# Patient Record
Sex: MALE | NOT HISPANIC OR LATINO
[De-identification: names, ages, dates, MRNs, and addresses within clinical notes are randomized per-mention and may not be internally consistent; named-entity substitution may affect disease eponyms.]

---

## 2017-02-08 ENCOUNTER — CHARTING TRANS (OUTPATIENT)
Dept: OTHER | Age: 67
End: 2017-02-08

## 2017-03-02 ENCOUNTER — TELEPHONE (OUTPATIENT)
Dept: FAMILY MEDICINE CLINIC | Facility: CLINIC | Age: 67
End: 2017-03-02

## 2017-03-02 RX ORDER — RANITIDINE 150 MG/1
150 TABLET ORAL NIGHTLY
Qty: 90 TABLET | Refills: 3 | Status: SHIPPED | OUTPATIENT
Start: 2017-03-02 | End: 2018-01-16

## 2017-03-02 RX ORDER — TAMSULOSIN HYDROCHLORIDE 0.4 MG/1
CAPSULE ORAL DAILY
Qty: 180 CAPSULE | Refills: 3 | Status: SHIPPED | OUTPATIENT
Start: 2017-03-02 | End: 2018-03-21

## 2017-03-02 RX ORDER — LISINOPRIL 5 MG/1
5 TABLET ORAL DAILY
Qty: 90 TABLET | Refills: 3 | Status: SHIPPED | OUTPATIENT
Start: 2017-03-02 | End: 2018-03-21

## 2017-03-02 NOTE — TELEPHONE ENCOUNTER
Left message on voicemail/answering machine for patient to call office  Please let him know scripts have been sent

## 2017-03-02 NOTE — TELEPHONE ENCOUNTER
Lisinopril, Tamsulosin and Ranitidine all in Epic as Historical.  Last labs in [address] since 2014. Last seen on 12/6/16. /80. Please advise.   Future Appointments  Date Time Provider Irene Naima   3/13/2017 9:15 AM Haley Ennis MD [facility]

## 2017-03-15 ENCOUNTER — TELEPHONE (OUTPATIENT)
Dept: FAMILY MEDICINE CLINIC | Facility: CLINIC | Age: 67
End: 2017-03-15

## 2017-03-16 PROBLEM — K21.00 GASTROESOPHAGEAL REFLUX DISEASE WITH ESOPHAGITIS: Status: ACTIVE | Noted: 2017-03-16

## 2017-03-16 PROBLEM — Z86.0100 PERSONAL HISTORY OF COLONIC POLYPS: Status: ACTIVE | Noted: 2017-03-16

## 2017-03-16 PROBLEM — K22.70 BARRETT'S ESOPHAGUS WITHOUT DYSPLASIA: Status: ACTIVE | Noted: 2017-03-16

## 2017-03-16 PROBLEM — Z86.010 PERSONAL HISTORY OF COLONIC POLYPS: Status: ACTIVE | Noted: 2017-03-16

## 2017-04-11 ENCOUNTER — HOSPITAL ENCOUNTER (OUTPATIENT)
Age: 67
Discharge: HOME | End: 2017-04-11
Attending: EMERGENCY MEDICINE
Payer: COMMERCIAL

## 2017-04-11 VITALS
OXYGEN SATURATION: 97 % | DIASTOLIC BLOOD PRESSURE: 81 MMHG | HEIGHT: 72 IN | TEMPERATURE: 99 F | SYSTOLIC BLOOD PRESSURE: 160 MMHG | RESPIRATION RATE: 16 BRPM | HEART RATE: 78 BPM | BODY MASS INDEX: 24.38 KG/M2 | WEIGHT: 180 LBS

## 2017-04-11 DIAGNOSIS — H66.001 ACUTE SUPPURATIVE OTITIS MEDIA OF RIGHT EAR WITHOUT SPONTANEOUS RUPTURE OF TYMPANIC MEMBRANE, RECURRENCE NOT SPECIFIED: Primary | ICD-10-CM

## 2017-04-11 DIAGNOSIS — R09.81 NASAL SINUS CONGESTION: ICD-10-CM

## 2017-04-11 PROCEDURE — 99214 OFFICE O/P EST MOD 30 MIN: CPT

## 2017-04-11 PROCEDURE — 99213 OFFICE O/P EST LOW 20 MIN: CPT

## 2017-04-11 RX ORDER — AZITHROMYCIN 250 MG/1
TABLET, FILM COATED ORAL
Qty: 1 PACKAGE | Refills: 0 | Status: SHIPPED | OUTPATIENT
Start: 2017-04-11 | End: 2017-04-16

## 2017-04-11 NOTE — ED PROVIDER NOTES
Patient presents with:  Sinusitis  Nasal Congestion    HPI:     Mykel Polanco is a 74year old male who presents with chief complaint of nasal congestion and ear pain. Started with nasal congestion very minimally about 1.5 weeks ago.   Over the last 2-3 media  Nasal sinus congestion    Plan:  1. Zpack Rx  2. Supportive care - APAP, antihistamine  3. F/u with PCP in 3-4 days as needed for re-evaluation, sooner if symptoms worsen      All results reviewed and discussed with patient.   See AVS for detailed

## 2017-05-04 ENCOUNTER — HOSPITAL ENCOUNTER (OUTPATIENT)
Age: 67
Discharge: HOME | End: 2017-05-04
Payer: COMMERCIAL

## 2017-05-04 VITALS
OXYGEN SATURATION: 96 % | RESPIRATION RATE: 18 BRPM | TEMPERATURE: 98 F | DIASTOLIC BLOOD PRESSURE: 80 MMHG | HEART RATE: 78 BPM | SYSTOLIC BLOOD PRESSURE: 123 MMHG

## 2017-05-04 DIAGNOSIS — B97.89 VIRAL SINUSITIS: Primary | ICD-10-CM

## 2017-05-04 DIAGNOSIS — J32.9 VIRAL SINUSITIS: Primary | ICD-10-CM

## 2017-05-04 PROCEDURE — 99213 OFFICE O/P EST LOW 20 MIN: CPT

## 2017-05-04 PROCEDURE — 99214 OFFICE O/P EST MOD 30 MIN: CPT

## 2017-05-04 RX ORDER — AZITHROMYCIN 250 MG/1
TABLET, FILM COATED ORAL
Qty: 1 PACKAGE | Refills: 0 | Status: SHIPPED | OUTPATIENT
Start: 2017-05-04 | End: 2017-05-09

## 2017-05-04 RX ORDER — LORATADINE 10 MG/1
10 TABLET ORAL DAILY
Qty: 30 TABLET | Refills: 0 | Status: SHIPPED | OUTPATIENT
Start: 2017-05-04 | End: 2017-06-03

## 2017-05-04 NOTE — ED PROVIDER NOTES
Patient Seen in: [address] [facility]    History   Patient presents with:  Ear Pain    Stated Complaint: LEFT EAR PAIN    HPI    Patient is a very pleasant 53-year-old male.   Patient arrives for evaluation of nasal congestion and left ear pain systems reviewed and negative except as noted above. PSFH elements reviewed from today and agreed except as otherwise stated in HPI.     Physical Exam       ED Triage Vitals   BP 05/04/17 1657 123/80 mmHg   Pulse 05/04/17 1657 78   Resp 05/04/17 1657 18 improving.     Disposition and Plan     Clinical Impression:  Viral sinusitis  (primary encounter diagnosis)    Disposition:  Discharge    Follow-up:  Cristal Thomas MD  [address] [place] [address],[address]  [facility] [phone]            Medications Prescrib

## 2017-07-20 PROCEDURE — 88305 TISSUE EXAM BY PATHOLOGIST: CPT | Performed by: INTERNAL MEDICINE

## 2017-10-10 ENCOUNTER — IMMUNIZATION (OUTPATIENT)
Dept: FAMILY MEDICINE CLINIC | Facility: CLINIC | Age: 67
End: 2017-10-10

## 2017-10-10 ENCOUNTER — MED REC SCAN ONLY (OUTPATIENT)
Dept: FAMILY MEDICINE CLINIC | Facility: CLINIC | Age: 67
End: 2017-10-10

## 2017-10-10 PROCEDURE — 90653 IIV ADJUVANT VACCINE IM: CPT | Performed by: FAMILY MEDICINE

## 2017-10-10 PROCEDURE — 90471 IMMUNIZATION ADMIN: CPT | Performed by: FAMILY MEDICINE

## 2017-11-27 ENCOUNTER — HOSPITAL ENCOUNTER (OUTPATIENT)
Age: 67
Discharge: HOME | End: 2017-11-27
Payer: COMMERCIAL

## 2017-11-27 ENCOUNTER — APPOINTMENT (OUTPATIENT)
Dept: GENERAL RADIOLOGY | Age: 67
End: 2017-11-27
Attending: NURSE PRACTITIONER
Payer: COMMERCIAL

## 2017-11-27 VITALS
HEIGHT: 71 IN | HEART RATE: 78 BPM | WEIGHT: 171 LBS | SYSTOLIC BLOOD PRESSURE: 114 MMHG | OXYGEN SATURATION: 96 % | BODY MASS INDEX: 23.94 KG/M2 | DIASTOLIC BLOOD PRESSURE: 71 MMHG | TEMPERATURE: 98 F | RESPIRATION RATE: 18 BRPM

## 2017-11-27 DIAGNOSIS — J01.00 ACUTE MAXILLARY SINUSITIS, RECURRENCE NOT SPECIFIED: ICD-10-CM

## 2017-11-27 DIAGNOSIS — J40 BRONCHITIS: Primary | ICD-10-CM

## 2017-11-27 PROCEDURE — 71020 XR CHEST PA + LAT CHEST (CPT=71020): CPT | Performed by: NURSE PRACTITIONER

## 2017-11-27 PROCEDURE — 99214 OFFICE O/P EST MOD 30 MIN: CPT

## 2017-11-27 PROCEDURE — 99213 OFFICE O/P EST LOW 20 MIN: CPT

## 2017-11-27 RX ORDER — DOXYCYCLINE HYCLATE 100 MG/1
100 CAPSULE ORAL 2 TIMES DAILY
Qty: 14 CAPSULE | Refills: 0 | Status: SHIPPED | OUTPATIENT
Start: 2017-11-27 | End: 2017-12-04

## 2017-11-27 RX ORDER — PREDNISONE 20 MG/1
20 TABLET ORAL 2 TIMES DAILY
Qty: 10 TABLET | Refills: 0 | Status: SHIPPED | OUTPATIENT
Start: 2017-11-27 | End: 2017-12-02

## 2017-11-27 NOTE — ED INITIAL ASSESSMENT (HPI)
Pt with c/o cough and congestion. Pt c/o productive green sputum. Pt with head pressure. Denies fevers. Taking robitussin and advil cold medicine. Pt c/o discomfort from coughing.

## 2017-11-27 NOTE — ED PROVIDER NOTES
Patient Seen in: [address] [facility]    History   Patient presents with:  Cough/URI    Stated Complaint: coughing and sinus infection    26-year-old male who presents to the immediate care with complaints of cough and congestion with a produc 0.0 oz/week     Comment: 1 or 2 per week       Review of Systems   Constitutional: Positive for chills. Negative for fever. HENT: Positive for congestion, postnasal drip, sinus pain and sinus pressure. Respiratory: Positive for cough.  Negative no decreased breath sounds. He has no wheezes. He has no rales. He exhibits no tenderness. Neurological: He is alert and oriented to person, place, and time. Skin: Skin is warm and dry. He is not diaphoretic.    Psychiatric: He has a normal mood and aff Prescribed:  Discharge Medication List as of 11/27/2017 11:08 AM    START taking these medications    Doxycycline Hyclate 100 MG Oral Cap  Take 1 capsule (100 mg total) by mouth 2 (two) times daily. , Normal, Disp-14 capsule, R-0    predniSONE 20 MG Oral Ta

## 2017-11-30 ENCOUNTER — TELEPHONE (OUTPATIENT)
Dept: FAMILY MEDICINE CLINIC | Facility: CLINIC | Age: 67
End: 2017-11-30

## 2017-11-30 RX ORDER — BENZONATATE 100 MG/1
100 CAPSULE ORAL 2 TIMES DAILY PRN
Qty: 14 CAPSULE | Refills: 0 | Status: SHIPPED | OUTPATIENT
Start: 2017-11-30 | End: 2017-12-07

## 2017-11-30 NOTE — TELEPHONE ENCOUNTER
Pt called, went to [facility] this past Monday, dx with Bronchitis. Pt told if he was not feeling any better in a couple of days to contact his PCP-Dr. Duran Young. Pt states he was given something and it is not working at all and still feels terrible.

## 2017-11-30 NOTE — TELEPHONE ENCOUNTER
Called Patient and discussed about his symptoms. Patient states he was seen at [facility] on 11/27/17. He was diagnosed with Bronchitis and sinusitis. CXR was normal.  Denies fever and SOB. States he started taking Doxycycline on 11/27/17.    Explained to patient t

## 2017-11-30 NOTE — TELEPHONE ENCOUNTER
Spoke with pt he was prescribed the following:    Doxycycline/hyclate 1000mg BID  20 mg Prednisone BID  Claritin daily  Flonase BID    Pt states that nothing is helping and he is coughing non-stop.   Unable to lay down to sleep, has to sleep sitting up in c

## 2017-12-04 ENCOUNTER — TELEPHONE (OUTPATIENT)
Dept: FAMILY MEDICINE CLINIC | Facility: CLINIC | Age: 67
End: 2017-12-04

## 2017-12-04 ENCOUNTER — OFFICE VISIT (OUTPATIENT)
Dept: FAMILY MEDICINE CLINIC | Facility: CLINIC | Age: 67
End: 2017-12-04

## 2017-12-04 VITALS
TEMPERATURE: 99 F | WEIGHT: 177.19 LBS | HEART RATE: 96 BPM | SYSTOLIC BLOOD PRESSURE: 120 MMHG | DIASTOLIC BLOOD PRESSURE: 70 MMHG | BODY MASS INDEX: 25 KG/M2 | RESPIRATION RATE: 18 BRPM

## 2017-12-04 DIAGNOSIS — J01.40 ACUTE NON-RECURRENT PANSINUSITIS: Primary | ICD-10-CM

## 2017-12-04 DIAGNOSIS — R05.9 COUGH: ICD-10-CM

## 2017-12-04 PROCEDURE — 99214 OFFICE O/P EST MOD 30 MIN: CPT | Performed by: FAMILY MEDICINE

## 2017-12-04 RX ORDER — CHLORAL HYDRATE 500 MG
1000 CAPSULE ORAL DAILY
COMMUNITY

## 2017-12-04 RX ORDER — PROMETHAZINE HYDROCHLORIDE AND CODEINE PHOSPHATE 6.25; 1 MG/5ML; MG/5ML
SYRUP ORAL NIGHTLY PRN
Qty: 120 ML | Refills: 0 | Status: SHIPPED
Start: 2017-12-04 | End: 2018-01-16 | Stop reason: ALTCHOICE

## 2017-12-04 RX ORDER — CLARITHROMYCIN 500 MG/1
500 TABLET, COATED ORAL 2 TIMES DAILY
Qty: 20 TABLET | Refills: 0 | Status: SHIPPED | OUTPATIENT
Start: 2017-12-04 | End: 2017-12-14

## 2017-12-04 NOTE — TELEPHONE ENCOUNTER
Called the pharmacy to find out which drug this interacts with - he states the tamulosin-       The biaxin slows down the breakdown of the tamulosin and can increase the med-    Spoke with Dr. Emerson Murillo and she wants the pt to hold the flomax while on the biaxi

## 2017-12-04 NOTE — TELEPHONE ENCOUNTER
Pt said he was told to called the office to talk with the nurse about how is suppose to take him medicine.  Please call back

## 2017-12-04 NOTE — TELEPHONE ENCOUNTER
Hellen Fernandez from pharmacy called. There is a drug interaction between the clarithromycin (BIAXIN) 500 MG Oral Tab and another medication pt currently takes.   Please call pharmacy at [phone]

## 2017-12-04 NOTE — TELEPHONE ENCOUNTER
Pt called back- then I called him back to let him know to hold the tamsulosin while on the biaxin- he v/u

## 2017-12-04 NOTE — PROGRESS NOTES
Sydnee Demarco is a 69year old male. HPI:   Pt is here for ER f/u.     ER: Richar Scanlon  Date: 11/27/17  Reason for ER visit: cough, congestion  Records received and reviewed: yes, diagnosed with bronchitis and sinusitis  Pertinent results/diagnoses from ER nightly. Disp: 90 tablet Rfl: 3   tamsulosin HCl (FLOMAX) 0.4 MG Oral Cap Take 1-2 capsules (0.4-0.8 mg total) by mouth daily. Disp: 180 capsule Rfl: 3   lisinopril 5 MG Oral Tab Take 1 tablet (5 mg total) by mouth daily.  Disp: 90 tablet Rfl: 3   aspirin 8 atraumatic, normocephalic,ear canals clear, normal TMs with small bi OME, nares boggy; and throat is clear without lesions nor inflammation but + thick PND; + tenderness to percussion in frontal and R max sinus regions  NECK: supple,no adenopathy, no thyro

## 2018-01-16 ENCOUNTER — OFFICE VISIT (OUTPATIENT)
Dept: FAMILY MEDICINE CLINIC | Facility: CLINIC | Age: 68
End: 2018-01-16

## 2018-01-16 VITALS
TEMPERATURE: 98 F | HEART RATE: 88 BPM | WEIGHT: 172.38 LBS | BODY MASS INDEX: 25.53 KG/M2 | SYSTOLIC BLOOD PRESSURE: 140 MMHG | HEIGHT: 69 IN | RESPIRATION RATE: 14 BRPM | DIASTOLIC BLOOD PRESSURE: 70 MMHG

## 2018-01-16 DIAGNOSIS — Z86.010 PERSONAL HISTORY OF COLONIC POLYPS: ICD-10-CM

## 2018-01-16 DIAGNOSIS — K22.70 BARRETT'S ESOPHAGUS WITHOUT DYSPLASIA: ICD-10-CM

## 2018-01-16 DIAGNOSIS — Z12.5 PROSTATE CANCER SCREENING: ICD-10-CM

## 2018-01-16 DIAGNOSIS — R22.2 LUMP IN CHEST: ICD-10-CM

## 2018-01-16 DIAGNOSIS — Z13.0 SCREENING, ANEMIA, DEFICIENCY, IRON: ICD-10-CM

## 2018-01-16 DIAGNOSIS — Z13.220 LIPID SCREENING: ICD-10-CM

## 2018-01-16 DIAGNOSIS — Z23 NEED FOR VACCINATION: ICD-10-CM

## 2018-01-16 DIAGNOSIS — Z13.1 DIABETES MELLITUS SCREENING: ICD-10-CM

## 2018-01-16 DIAGNOSIS — Z00.00 ROUTINE HISTORY AND PHYSICAL EXAMINATION OF ADULT: Primary | ICD-10-CM

## 2018-01-16 DIAGNOSIS — Z13.29 THYROID DISORDER SCREEN: ICD-10-CM

## 2018-01-16 DIAGNOSIS — I10 BENIGN HYPERTENSION: ICD-10-CM

## 2018-01-16 DIAGNOSIS — N40.0 BENIGN PROSTATIC HYPERPLASIA WITHOUT LOWER URINARY TRACT SYMPTOMS: ICD-10-CM

## 2018-01-16 DIAGNOSIS — K21.00 GASTROESOPHAGEAL REFLUX DISEASE WITH ESOPHAGITIS: ICD-10-CM

## 2018-01-16 DIAGNOSIS — M15.9 PRIMARY OSTEOARTHRITIS INVOLVING MULTIPLE JOINTS: ICD-10-CM

## 2018-01-16 DIAGNOSIS — Z13.21 ENCOUNTER FOR VITAMIN DEFICIENCY SCREENING: ICD-10-CM

## 2018-01-16 PROCEDURE — 36415 COLL VENOUS BLD VENIPUNCTURE: CPT | Performed by: FAMILY MEDICINE

## 2018-01-16 PROCEDURE — 84153 ASSAY OF PSA TOTAL: CPT | Performed by: FAMILY MEDICINE

## 2018-01-16 PROCEDURE — 80050 GENERAL HEALTH PANEL: CPT | Performed by: FAMILY MEDICINE

## 2018-01-16 PROCEDURE — 83036 HEMOGLOBIN GLYCOSYLATED A1C: CPT | Performed by: FAMILY MEDICINE

## 2018-01-16 PROCEDURE — 80061 LIPID PANEL: CPT | Performed by: FAMILY MEDICINE

## 2018-01-16 PROCEDURE — 81003 URINALYSIS AUTO W/O SCOPE: CPT | Performed by: FAMILY MEDICINE

## 2018-01-16 PROCEDURE — 90715 TDAP VACCINE 7 YRS/> IM: CPT | Performed by: FAMILY MEDICINE

## 2018-01-16 PROCEDURE — 82306 VITAMIN D 25 HYDROXY: CPT | Performed by: FAMILY MEDICINE

## 2018-01-16 PROCEDURE — 99397 PER PM REEVAL EST PAT 65+ YR: CPT | Performed by: FAMILY MEDICINE

## 2018-01-16 PROCEDURE — 90471 IMMUNIZATION ADMIN: CPT | Performed by: FAMILY MEDICINE

## 2018-01-16 NOTE — PROGRESS NOTES
Gamaliel Campo is a 69year old male who presents for a complete physical exam.   HPI:   Pt complains of nothing major, recovered quickly after starting the abx. He is looking into insurance to see if allergist covered.     He has no new concerns today ap Release Take 1 capsule (20 mg total) by mouth every morning. Disp: 30 capsule Rfl: 11   tamsulosin HCl (FLOMAX) 0.4 MG Oral Cap Take 1-2 capsules (0.4-0.8 mg total) by mouth daily.  Disp: 180 capsule Rfl: 3   lisinopril 5 MG Oral Tab Take 1 tablet (5 mg tot cough  CARDIOVASCULAR: denies chest pain on exertion  GI: denies abdominal pain,denies heartburn  : denies nocturia or changes in stream  MUSCULOSKELETAL: denies new or unusual back or other joint pains (has some chronic joint pains, see HPI, doesn't mayda relays a very hectic day rushing around) is to blame; will recheck next visit; CPM    BPH: symptoms well controlled on tamsulosin, CPM    GERD/barretts: UTD on scope, cont omeprazole    Hx of colon polyps: UTD on colonoscopy     The patient indicates under

## 2018-01-17 ENCOUNTER — TELEPHONE (OUTPATIENT)
Dept: FAMILY MEDICINE CLINIC | Facility: CLINIC | Age: 68
End: 2018-01-17

## 2018-01-17 DIAGNOSIS — R79.89 ABNORMAL THYROID BLOOD TEST: ICD-10-CM

## 2018-01-17 DIAGNOSIS — N40.0 BENIGN PROSTATIC HYPERPLASIA WITHOUT LOWER URINARY TRACT SYMPTOMS: Primary | ICD-10-CM

## 2018-01-17 DIAGNOSIS — E55.9 VITAMIN D DEFICIENCY: ICD-10-CM

## 2018-01-17 LAB
25-HYDROXYVITAMIN D (TOTAL): 18.9 NG/ML (ref 30–100)
ALBUMIN SERPL-MCNC: 4.4 G/DL (ref 3.5–4.8)
ALP LIVER SERPL-CCNC: 77 U/L (ref 45–117)
ALT SERPL-CCNC: 20 U/L (ref 17–63)
AST SERPL-CCNC: 15 U/L (ref 15–41)
BASOPHILS # BLD AUTO: 0.05 X10(3) UL (ref 0–0.1)
BASOPHILS NFR BLD AUTO: 0.6 %
BILIRUB SERPL-MCNC: 1 MG/DL (ref 0.1–2)
BILIRUB UR QL STRIP.AUTO: NEGATIVE
BUN BLD-MCNC: 18 MG/DL (ref 8–20)
CALCIUM BLD-MCNC: 9.5 MG/DL (ref 8.3–10.3)
CHLORIDE: 103 MMOL/L (ref 101–111)
CHOLEST SMN-MCNC: 213 MG/DL (ref ?–200)
CLARITY UR REFRACT.AUTO: CLEAR
CO2: 24 MMOL/L (ref 22–32)
COLOR UR AUTO: YELLOW
CREAT BLD-MCNC: 1 MG/DL (ref 0.7–1.3)
EOSINOPHIL # BLD AUTO: 0.18 X10(3) UL (ref 0–0.3)
EOSINOPHIL NFR BLD AUTO: 2.3 %
ERYTHROCYTE [DISTWIDTH] IN BLOOD BY AUTOMATED COUNT: 12.5 % (ref 11.5–16)
EST. AVERAGE GLUCOSE BLD GHB EST-MCNC: 111 MG/DL (ref 68–126)
GLUCOSE BLD-MCNC: 95 MG/DL (ref 70–99)
GLUCOSE UR STRIP.AUTO-MCNC: NEGATIVE MG/DL
HBA1C MFR BLD HPLC: 5.5 % (ref ?–5.7)
HCT VFR BLD AUTO: 45.3 % (ref 37–53)
HDLC SERPL-MCNC: 60 MG/DL (ref 45–?)
HDLC SERPL: 3.55 {RATIO} (ref ?–4.97)
HGB BLD-MCNC: 15.6 G/DL (ref 13–17)
IMMATURE GRANULOCYTE COUNT: 0.06 X10(3) UL (ref 0–1)
IMMATURE GRANULOCYTE RATIO %: 0.8 %
LDLC SERPL CALC-MCNC: 140 MG/DL (ref ?–130)
LEUKOCYTE ESTERASE UR QL STRIP.AUTO: NEGATIVE
LYMPHOCYTES # BLD AUTO: 1.72 X10(3) UL (ref 0.9–4)
LYMPHOCYTES NFR BLD AUTO: 21.9 %
M PROTEIN MFR SERPL ELPH: 7.7 G/DL (ref 6.1–8.3)
MCH RBC QN AUTO: 31.6 PG (ref 27–33.2)
MCHC RBC AUTO-ENTMCNC: 34.4 G/DL (ref 31–37)
MCV RBC AUTO: 91.7 FL (ref 80–99)
MONOCYTES # BLD AUTO: 0.45 X10(3) UL (ref 0.1–0.6)
MONOCYTES NFR BLD AUTO: 5.7 %
NEUTROPHIL ABS PRELIM: 5.4 X10 (3) UL (ref 1.3–6.7)
NEUTROPHILS # BLD AUTO: 5.4 X10(3) UL (ref 1.3–6.7)
NEUTROPHILS NFR BLD AUTO: 68.7 %
NITRITE UR QL STRIP.AUTO: NEGATIVE
NONHDLC SERPL-MCNC: 153 MG/DL (ref ?–130)
PH UR STRIP.AUTO: 5 [PH] (ref 4.5–8)
PLATELET # BLD AUTO: 198 10(3)UL (ref 150–450)
POTASSIUM SERPL-SCNC: 4.2 MMOL/L (ref 3.6–5.1)
PROT UR STRIP.AUTO-MCNC: NEGATIVE MG/DL
PSA SERPL-MCNC: 4.03 NG/ML (ref 0.01–4)
RBC # BLD AUTO: 4.94 X10(6)UL (ref 3.8–5.8)
RBC UR QL AUTO: NEGATIVE
RED CELL DISTRIBUTION WIDTH-SD: 41.9 FL (ref 35.1–46.3)
SODIUM SERPL-SCNC: 137 MMOL/L (ref 136–144)
SP GR UR STRIP.AUTO: 1.02 (ref 1–1.03)
TRIGL SERPL-MCNC: 65 MG/DL (ref ?–150)
TSI SER-ACNC: 0.32 MIU/ML (ref 0.35–5.5)
UROBILINOGEN UR STRIP.AUTO-MCNC: <2 MG/DL
VLDLC SERPL CALC-MCNC: 13 MG/DL (ref 5–40)
WBC # BLD AUTO: 7.9 X10(3) UL (ref 4–13)

## 2018-01-18 NOTE — TELEPHONE ENCOUNTER
----- Message from Danielle Shah MD sent at 1/17/2018  7:25 AM CST -----  Please notify pt labs look good overall, just a few things to f/u on:  1) vitamin D is quite low. This is very common.   40-80 ideal, <20 I usually recommend prescription strength to

## 2018-01-25 ENCOUNTER — HOSPITAL ENCOUNTER (OUTPATIENT)
Age: 68
Discharge: HOME | End: 2018-01-25
Payer: COMMERCIAL

## 2018-01-25 VITALS
HEART RATE: 85 BPM | BODY MASS INDEX: 25.18 KG/M2 | SYSTOLIC BLOOD PRESSURE: 124 MMHG | WEIGHT: 170 LBS | RESPIRATION RATE: 16 BRPM | OXYGEN SATURATION: 98 % | HEIGHT: 69 IN | DIASTOLIC BLOOD PRESSURE: 77 MMHG | TEMPERATURE: 98 F

## 2018-01-25 DIAGNOSIS — S81.802A NON-HEALING WOUND OF LOWER EXTREMITY, LEFT, INITIAL ENCOUNTER: Primary | ICD-10-CM

## 2018-01-25 PROCEDURE — 87077 CULTURE AEROBIC IDENTIFY: CPT | Performed by: PHYSICIAN ASSISTANT

## 2018-01-25 PROCEDURE — 87205 SMEAR GRAM STAIN: CPT | Performed by: PHYSICIAN ASSISTANT

## 2018-01-25 PROCEDURE — 99214 OFFICE O/P EST MOD 30 MIN: CPT

## 2018-01-25 PROCEDURE — 87070 CULTURE OTHR SPECIMN AEROBIC: CPT | Performed by: PHYSICIAN ASSISTANT

## 2018-01-25 RX ORDER — CEPHALEXIN 500 MG/1
500 CAPSULE ORAL 3 TIMES DAILY
Qty: 21 CAPSULE | Refills: 0 | Status: SHIPPED | OUTPATIENT
Start: 2018-01-25 | End: 2018-02-01

## 2018-01-25 NOTE — ED INITIAL ASSESSMENT (HPI)
Pt states fell one month ago, cut left knee. Pt states healed on its own. Pt noticed pain started on Monday. Pt states has some drainage.   Denies fevers

## 2018-01-25 NOTE — ED PROVIDER NOTES
Patient Seen in: [address] [facility]    History   Patient presents with:  Laceration Abrasion (integumentary)    Stated Complaint: left knee pain    HPI    Patient is a pleasant 66-year-old male.   One month prior to arrival, patient struck hi Current:/77   Pulse 85   Temp 97.7 °F (36.5 °C) (Temporal)   Resp 16   Ht 175.3 cm (5' 9\")   Wt 77.1 kg   SpO2 98%   BMI 25.10 kg/m²         Physical Exam   Musculoskeletal:        Legs:      Gen: Well appearing, well groomed, alert and aware x 3  N

## 2018-01-29 RX ORDER — ERGOCALCIFEROL 1.25 MG/1
50000 CAPSULE ORAL WEEKLY
Qty: 4 CAPSULE | Refills: 2 | Status: SHIPPED | OUTPATIENT
Start: 2018-01-29 | End: 2021-02-16 | Stop reason: ALTCHOICE

## 2018-01-29 NOTE — TELEPHONE ENCOUNTER
Pt called back and advised of the test results and recommendations he v/u recalls entered for PSA in 6 months and TSH and vit d in 3 months, future orders placed also    Vit D sent to the pharmacy

## 2018-03-07 ENCOUNTER — CHARTING TRANS (OUTPATIENT)
Dept: OTHER | Age: 68
End: 2018-03-07

## 2018-03-09 ENCOUNTER — HOSPITAL ENCOUNTER (OUTPATIENT)
Age: 68
Discharge: HOME | End: 2018-03-09
Payer: COMMERCIAL

## 2018-03-09 ENCOUNTER — TELEPHONE (OUTPATIENT)
Dept: FAMILY MEDICINE CLINIC | Facility: CLINIC | Age: 68
End: 2018-03-09

## 2018-03-09 VITALS
SYSTOLIC BLOOD PRESSURE: 156 MMHG | BODY MASS INDEX: 25.77 KG/M2 | HEIGHT: 69 IN | OXYGEN SATURATION: 98 % | TEMPERATURE: 99 F | RESPIRATION RATE: 16 BRPM | WEIGHT: 174 LBS | DIASTOLIC BLOOD PRESSURE: 81 MMHG | HEART RATE: 68 BPM

## 2018-03-09 DIAGNOSIS — R33.9 URINARY RETENTION: Primary | ICD-10-CM

## 2018-03-09 LAB
POCT BILIRUBIN URINE: NEGATIVE
POCT BLOOD URINE: NEGATIVE
POCT GLUCOSE URINE: NEGATIVE MG/DL
POCT KETONE URINE: NEGATIVE MG/DL
POCT LEUKOCYTE ESTERASE URINE: NEGATIVE
POCT NITRITE URINE: NEGATIVE
POCT PH URINE: 7 (ref 5–8)
POCT PROTEIN URINE: NEGATIVE MG/DL
POCT SPECIFIC GRAVITY URINE: 1.01
POCT URINE CLARITY: CLEAR
POCT URINE COLOR: YELLOW
POCT UROBILINOGEN URINE: 0.2 MG/DL

## 2018-03-09 PROCEDURE — 99213 OFFICE O/P EST LOW 20 MIN: CPT

## 2018-03-09 PROCEDURE — 87086 URINE CULTURE/COLONY COUNT: CPT | Performed by: NURSE PRACTITIONER

## 2018-03-09 PROCEDURE — 99214 OFFICE O/P EST MOD 30 MIN: CPT

## 2018-03-09 PROCEDURE — 81002 URINALYSIS NONAUTO W/O SCOPE: CPT | Performed by: NURSE PRACTITIONER

## 2018-03-09 NOTE — ED INITIAL ASSESSMENT (HPI)
Pt here w/ c/o feeling like he had to urinate but had a continuous urge throughout the night. Was able to pass some urine but the sensation remained.  Pt is taking mucinex

## 2018-03-09 NOTE — TELEPHONE ENCOUNTER
Spoke with the pt and he feels that at night he has to urinate-he was up every 30 min, but he couldn't go everytime he got up. He states that in the past he was on an abx and he had to hold his flomax. He is on doxycycline for a sinus infection.  He wonders

## 2018-03-09 NOTE — TELEPHONE ENCOUNTER
Wife called back and I explained the notes from [address]. The wife states asks if there is anything else that could be causing the urinary symptoms medication wise- Advised that it doesn't look like there is anything.  Advised that if the symptoms continue

## 2018-03-09 NOTE — TELEPHONE ENCOUNTER
Pt is  Having feeling lie he has to urinate and when he tries he is unable to do so at night. He has been going throughout the day just fine. Thinks he has something to do with talking doxycyclines and taken his flomax.      Please return call to [phone]

## 2018-03-09 NOTE — ED PROVIDER NOTES
Patient Seen in: [address] [facility]    History   Patient presents with:  Urinary Symptoms (urologic)    Stated Complaint: feel the need to urine    57-year-old male presents today with history of urinary retention last night.   States felt th Smokeless tobacco: Never Used                      Alcohol use: Yes           0.0 oz/week     Comment: 6 or 8 drinks /week      Review of Systems    Positive for stated complaint: feel the need to urine  Oth retention  (primary encounter diagnosis)    Disposition:  Discharge  3/9/2018  2:53 pm    Follow-up:  Dona Rodriguez MD  [address] [facility],[address]  [facility] [phone]    In 1 week  As needed    [address] [place] [address]  [id number] [address]

## 2018-03-09 NOTE — TELEPHONE ENCOUNTER
I think Patient should continue taking Flomax. No major drug interactions with Doxycycline. Flomax should help with his urinary symptoms. If symptoms worsen or do not get better he should be seen. Thanks.     Dee Bundy MD

## 2018-03-21 RX ORDER — TAMSULOSIN HYDROCHLORIDE 0.4 MG/1
CAPSULE ORAL
Qty: 180 CAPSULE | Refills: 0 | Status: SHIPPED | OUTPATIENT
Start: 2018-03-21 | End: 2018-08-08

## 2018-03-21 RX ORDER — LISINOPRIL 5 MG/1
TABLET ORAL
Qty: 90 TABLET | Refills: 0 | Status: SHIPPED | OUTPATIENT
Start: 2018-03-21 | End: 2018-06-22

## 2018-03-21 NOTE — TELEPHONE ENCOUNTER
Lisinopril last refilled on 3/2/17 for # 90 with 3 refills  tamsulosin last refilled on 3/2/17 for # 180 with 3 refills  Last seen on 1/16/18 /70.   Future Appointments  Date Time Provider Irene Mcnamara   3/23/2018 2:10 PM Shailesh Cotto MD [facility]

## 2018-04-11 ENCOUNTER — TELEPHONE (OUTPATIENT)
Dept: FAMILY MEDICINE CLINIC | Facility: CLINIC | Age: 68
End: 2018-04-11

## 2018-04-11 NOTE — TELEPHONE ENCOUNTER
Pt would like to get a heart scoring done. And would like to talk to the nurse more about these test and what it is.    Please return call to

## 2018-04-11 NOTE — TELEPHONE ENCOUNTER
Spoke with the pt and he states that his sister had an UFHS done and had a major artery that was 70% blocked and then his brother had one done and needs a procedure now- the pt is thinking that he should have one.  I explained that it is a quick   CT scan t

## 2018-04-24 ENCOUNTER — TELEPHONE (OUTPATIENT)
Dept: FAMILY MEDICINE CLINIC | Facility: CLINIC | Age: 68
End: 2018-04-24

## 2018-04-24 ENCOUNTER — HOSPITAL ENCOUNTER (OUTPATIENT)
Dept: CT IMAGING | Age: 68
Discharge: HOME | End: 2018-04-24
Attending: FAMILY MEDICINE

## 2018-04-24 DIAGNOSIS — Z13.9 ENCOUNTER FOR SCREENING: ICD-10-CM

## 2018-04-24 NOTE — TELEPHONE ENCOUNTER
Pt had the UFHS today 4/24/18      He is due for vit d level and tsh with reflex now the PSA is not due until July      Spoke with the pt and advised of the blood work that is due and we scheduled a nurse visit  Future Appointments  Date Time Provider Leighton

## 2018-04-24 NOTE — TELEPHONE ENCOUNTER
Pt is done with Vit D tablets. [address] wanted pt to take some test after he was done with pills, but pt doesn't know what test it is.   UF HeartScan results are to be sent to [address], pt thought he may need more testing

## 2018-04-25 ENCOUNTER — NURSE ONLY (OUTPATIENT)
Dept: FAMILY MEDICINE CLINIC | Facility: CLINIC | Age: 68
End: 2018-04-25

## 2018-04-25 DIAGNOSIS — N40.0 BENIGN PROSTATIC HYPERPLASIA WITHOUT LOWER URINARY TRACT SYMPTOMS: ICD-10-CM

## 2018-04-25 DIAGNOSIS — E55.9 VITAMIN D DEFICIENCY: ICD-10-CM

## 2018-04-25 DIAGNOSIS — R79.89 ABNORMAL THYROID BLOOD TEST: ICD-10-CM

## 2018-04-25 PROCEDURE — 82306 VITAMIN D 25 HYDROXY: CPT | Performed by: FAMILY MEDICINE

## 2018-04-25 PROCEDURE — 84439 ASSAY OF FREE THYROXINE: CPT | Performed by: FAMILY MEDICINE

## 2018-04-25 PROCEDURE — 36415 COLL VENOUS BLD VENIPUNCTURE: CPT | Performed by: FAMILY MEDICINE

## 2018-04-25 PROCEDURE — 84153 ASSAY OF PSA TOTAL: CPT | Performed by: FAMILY MEDICINE

## 2018-04-25 PROCEDURE — 84443 ASSAY THYROID STIM HORMONE: CPT | Performed by: FAMILY MEDICINE

## 2018-04-25 NOTE — PROGRESS NOTES
Colt Kong presents today for nurse visit. 1 gold, 1 light green drawn from left ac area with 1 attempt with straight needle. Left office in stable condition.

## 2018-04-26 ENCOUNTER — TELEPHONE (OUTPATIENT)
Dept: FAMILY MEDICINE CLINIC | Facility: CLINIC | Age: 68
End: 2018-04-26

## 2018-04-26 DIAGNOSIS — R93.89 ABNORMAL CHEST CT: Primary | ICD-10-CM

## 2018-04-26 NOTE — TELEPHONE ENCOUNTER
Patient advised on chest CT. Verbalized understanding. Provided phone number for Central Scheduling [phone]. Left message for patient to call back. Need to notify of lab results.

## 2018-04-26 NOTE — TELEPHONE ENCOUNTER
----- Message from Trent Wylie MD sent at 4/26/2018 11:12 AM CDT -----  Please notify patient the calcium score from his recent UFHS is still pending, but the radiologist noted a few \"incidental findings\"--meaning even though this test was done to look

## 2018-04-26 NOTE — TELEPHONE ENCOUNTER
----- Message from Raquel Hurst MD sent at 4/26/2018 11:47 AM CDT -----  Vitamin D much better and normal.  For maintenance take OTC vitamin De 5000IU daily Oct-April, 3-5x/week May-Sept.  When you buy a supplement make sure it has the USP label on it ens

## 2018-04-27 ENCOUNTER — HOSPITAL ENCOUNTER (OUTPATIENT)
Dept: CT IMAGING | Age: 68
Discharge: HOME | End: 2018-04-27
Attending: FAMILY MEDICINE
Payer: COMMERCIAL

## 2018-04-27 DIAGNOSIS — R93.89 ABNORMAL CHEST CT: ICD-10-CM

## 2018-04-27 PROCEDURE — 71250 CT THORAX DX C-: CPT | Performed by: FAMILY MEDICINE

## 2018-04-30 ENCOUNTER — TELEPHONE (OUTPATIENT)
Dept: FAMILY MEDICINE CLINIC | Facility: CLINIC | Age: 68
End: 2018-04-30

## 2018-04-30 NOTE — TELEPHONE ENCOUNTER
Pt called, was wondering if we have the results of the CT scan that pt had done this past Friday?     Please call pt at [phone]

## 2018-04-30 NOTE — TELEPHONE ENCOUNTER
Patient advised that we have no yet received results and will call when we do get them. Patient verbalized understanding.

## 2018-05-01 ENCOUNTER — TELEPHONE (OUTPATIENT)
Dept: FAMILY MEDICINE CLINIC | Facility: CLINIC | Age: 68
End: 2018-05-01

## 2018-05-01 NOTE — TELEPHONE ENCOUNTER
Notified pt of CT findings, including the higher than average calcium score, dilated aorta and incidental lung findings. He has no cardiac nor pulm symptoms (no chest pain, teran, decrease in exercise tolerance, edema, cough, sputum).  Referred him to cards

## 2018-06-22 RX ORDER — LISINOPRIL 5 MG/1
TABLET ORAL
Qty: 90 TABLET | Refills: 0 | Status: SHIPPED | OUTPATIENT
Start: 2018-06-22 | End: 2018-07-31 | Stop reason: DRUGHIGH

## 2018-06-22 NOTE — TELEPHONE ENCOUNTER
Last refilled on 3/21/18 for # 90 with 0 refills  Last seen on 1/16/18, /60  Future Appointments  Date Time Provider Irene Mcnamara   7/31/2018 8:20 AM Tanner Warren MD [facility]        Thank you.

## 2018-07-14 ENCOUNTER — HOSPITAL ENCOUNTER (OUTPATIENT)
Facility: HOSPITAL | Age: 68
Setting detail: OBSERVATION
Discharge: HOME | End: 2018-07-16
Attending: EMERGENCY MEDICINE | Admitting: HOSPITALIST
Payer: COMMERCIAL

## 2018-07-14 ENCOUNTER — APPOINTMENT (OUTPATIENT)
Dept: GENERAL RADIOLOGY | Facility: HOSPITAL | Age: 68
End: 2018-07-14
Attending: EMERGENCY MEDICINE
Payer: COMMERCIAL

## 2018-07-14 DIAGNOSIS — I20.8 ANGINAL EQUIVALENT (HCC): Primary | ICD-10-CM

## 2018-07-14 PROBLEM — N40.0 BENIGN PROSTATIC HYPERPLASIA: Status: ACTIVE | Noted: 2018-01-16

## 2018-07-14 PROBLEM — I20.89 ANGINAL EQUIVALENT: Status: ACTIVE | Noted: 2018-07-14

## 2018-07-14 PROBLEM — I20.89 ANGINAL EQUIVALENT (HCC): Status: ACTIVE | Noted: 2018-07-14

## 2018-07-14 LAB
ALBUMIN SERPL-MCNC: 4 G/DL (ref 3.5–4.8)
ALP LIVER SERPL-CCNC: 82 U/L (ref 45–117)
ALT SERPL-CCNC: 23 U/L (ref 17–63)
AST SERPL-CCNC: 15 U/L (ref 15–41)
BASOPHILS # BLD AUTO: 0.03 X10(3) UL (ref 0–0.1)
BASOPHILS NFR BLD AUTO: 0.3 %
BILIRUB SERPL-MCNC: 0.6 MG/DL (ref 0.1–2)
BUN BLD-MCNC: 19 MG/DL (ref 8–20)
CALCIUM BLD-MCNC: 9.3 MG/DL (ref 8.3–10.3)
CHLORIDE: 105 MMOL/L (ref 101–111)
CO2: 27 MMOL/L (ref 22–32)
CREAT BLD-MCNC: 1.21 MG/DL (ref 0.7–1.3)
EOSINOPHIL # BLD AUTO: 0.16 X10(3) UL (ref 0–0.3)
EOSINOPHIL NFR BLD AUTO: 1.9 %
ERYTHROCYTE [DISTWIDTH] IN BLOOD BY AUTOMATED COUNT: 12.8 % (ref 11.5–16)
GLUCOSE BLD-MCNC: 115 MG/DL (ref 70–99)
HCT VFR BLD AUTO: 41.8 % (ref 37–53)
HGB BLD-MCNC: 14.5 G/DL (ref 13–17)
IMMATURE GRANULOCYTE COUNT: 0.03 X10(3) UL (ref 0–1)
IMMATURE GRANULOCYTE RATIO %: 0.3 %
LYMPHOCYTES # BLD AUTO: 1.54 X10(3) UL (ref 0.9–4)
LYMPHOCYTES NFR BLD AUTO: 17.9 %
M PROTEIN MFR SERPL ELPH: 7.4 G/DL (ref 6.1–8.3)
MCH RBC QN AUTO: 31.5 PG (ref 27–33.2)
MCHC RBC AUTO-ENTMCNC: 34.7 G/DL (ref 31–37)
MCV RBC AUTO: 90.7 FL (ref 80–99)
MONOCYTES # BLD AUTO: 0.57 X10(3) UL (ref 0.1–1)
MONOCYTES NFR BLD AUTO: 6.6 %
NEUTROPHIL ABS PRELIM: 6.29 X10 (3) UL (ref 1.3–6.7)
NEUTROPHILS # BLD AUTO: 6.29 X10(3) UL (ref 1.3–6.7)
NEUTROPHILS NFR BLD AUTO: 73 %
PLATELET # BLD AUTO: 181 10(3)UL (ref 150–450)
POTASSIUM SERPL-SCNC: 4 MMOL/L (ref 3.6–5.1)
RBC # BLD AUTO: 4.61 X10(6)UL (ref 3.8–5.8)
RED CELL DISTRIBUTION WIDTH-SD: 42.1 FL (ref 35.1–46.3)
SODIUM SERPL-SCNC: 139 MMOL/L (ref 136–144)
TROPONIN: <0.046 NG/ML (ref ?–0.05)
WBC # BLD AUTO: 8.6 X10(3) UL (ref 4–13)

## 2018-07-14 PROCEDURE — 71045 X-RAY EXAM CHEST 1 VIEW: CPT | Performed by: EMERGENCY MEDICINE

## 2018-07-14 PROCEDURE — 99220 INITIAL OBSERVATION CARE,LEVL III: CPT | Performed by: HOSPITALIST

## 2018-07-14 RX ORDER — ACETAMINOPHEN 500 MG
1000 TABLET ORAL EVERY 6 HOURS PRN
Status: DISCONTINUED | OUTPATIENT
Start: 2018-07-14 | End: 2018-07-16

## 2018-07-14 RX ORDER — LISINOPRIL 5 MG/1
5 TABLET ORAL DAILY
Status: DISCONTINUED | OUTPATIENT
Start: 2018-07-14 | End: 2018-07-16

## 2018-07-14 RX ORDER — IBUPROFEN 400 MG/1
400 TABLET ORAL EVERY 4 HOURS PRN
Status: DISCONTINUED | OUTPATIENT
Start: 2018-07-14 | End: 2018-07-16

## 2018-07-14 RX ORDER — ENOXAPARIN SODIUM 100 MG/ML
40 INJECTION SUBCUTANEOUS DAILY
Status: DISCONTINUED | OUTPATIENT
Start: 2018-07-14 | End: 2018-07-16

## 2018-07-14 RX ORDER — NITROGLYCERIN 0.4 MG/1
0.4 TABLET SUBLINGUAL EVERY 5 MIN PRN
Status: DISCONTINUED | OUTPATIENT
Start: 2018-07-14 | End: 2018-07-16

## 2018-07-14 RX ORDER — IBUPROFEN 600 MG/1
600 TABLET ORAL EVERY 4 HOURS PRN
Status: DISCONTINUED | OUTPATIENT
Start: 2018-07-14 | End: 2018-07-16

## 2018-07-14 RX ORDER — ASPIRIN 81 MG/1
324 TABLET, CHEWABLE ORAL ONCE
Status: COMPLETED | OUTPATIENT
Start: 2018-07-14 | End: 2018-07-14

## 2018-07-14 RX ORDER — ALFUZOSIN HYDROCHLORIDE 10 MG/1
10 TABLET, EXTENDED RELEASE ORAL
Status: DISCONTINUED | OUTPATIENT
Start: 2018-07-15 | End: 2018-07-16

## 2018-07-14 RX ORDER — PANTOPRAZOLE SODIUM 20 MG/1
20 TABLET, DELAYED RELEASE ORAL
Status: DISCONTINUED | OUTPATIENT
Start: 2018-07-15 | End: 2018-07-16

## 2018-07-14 RX ORDER — SODIUM CHLORIDE 9 MG/ML
INJECTION, SOLUTION INTRAVENOUS CONTINUOUS
Status: DISCONTINUED | OUTPATIENT
Start: 2018-07-14 | End: 2018-07-15

## 2018-07-14 RX ORDER — IBUPROFEN 200 MG
200 TABLET ORAL EVERY 4 HOURS PRN
Status: DISCONTINUED | OUTPATIENT
Start: 2018-07-14 | End: 2018-07-16

## 2018-07-14 RX ORDER — ASPIRIN 325 MG
325 TABLET ORAL DAILY
Status: DISCONTINUED | OUTPATIENT
Start: 2018-07-14 | End: 2018-07-16

## 2018-07-15 ENCOUNTER — APPOINTMENT (OUTPATIENT)
Dept: CV DIAGNOSTICS | Facility: HOSPITAL | Age: 68
End: 2018-07-15
Attending: HOSPITALIST
Payer: COMMERCIAL

## 2018-07-15 LAB
ATRIAL RATE: 68 BPM
BUN BLD-MCNC: 18 MG/DL (ref 8–20)
CALCIUM BLD-MCNC: 8.7 MG/DL (ref 8.3–10.3)
CHLORIDE: 108 MMOL/L (ref 101–111)
CHOLEST SMN-MCNC: 151 MG/DL (ref ?–200)
CO2: 27 MMOL/L (ref 22–32)
CREAT BLD-MCNC: 1 MG/DL (ref 0.7–1.3)
GLUCOSE BLD-MCNC: 105 MG/DL (ref 70–99)
HDLC SERPL-MCNC: 38 MG/DL (ref 45–?)
HDLC SERPL: 3.97 {RATIO} (ref ?–4.97)
LDLC SERPL CALC-MCNC: 86 MG/DL (ref ?–130)
NONHDLC SERPL-MCNC: 113 MG/DL (ref ?–130)
P AXIS: 41 DEGREES
P-R INTERVAL: 196 MS
POTASSIUM SERPL-SCNC: 4.2 MMOL/L (ref 3.6–5.1)
Q-T INTERVAL: 414 MS
QRS DURATION: 132 MS
QTC CALCULATION (BEZET): 440 MS
R AXIS: 53 DEGREES
SODIUM SERPL-SCNC: 141 MMOL/L (ref 136–144)
T AXIS: 15 DEGREES
TRIGL SERPL-MCNC: 135 MG/DL (ref ?–150)
TROPONIN: <0.046 NG/ML (ref ?–0.05)
VENTRICULAR RATE: 68 BPM
VLDLC SERPL CALC-MCNC: 27 MG/DL (ref 5–40)

## 2018-07-15 PROCEDURE — 99225 SUBSEQUENT OBSERVATION CARE: CPT | Performed by: HOSPITALIST

## 2018-07-15 PROCEDURE — 93306 TTE W/DOPPLER COMPLETE: CPT | Performed by: HOSPITALIST

## 2018-07-15 RX ORDER — ZOLPIDEM TARTRATE 5 MG/1
5 TABLET ORAL NIGHTLY PRN
Status: DISCONTINUED | OUTPATIENT
Start: 2018-07-15 | End: 2018-07-16

## 2018-07-15 NOTE — H&P
[facility]  History and Physical     Rossi Casillas Patient Status:  Emergency    1950 MRN [id number]   Location [address] [facility] Attending No att. providers found   Hosp Day # 0 PCP Vladimir Plaza MD     Chief Complaint Amoxicillin-Pot Cla*    DIARRHEA, NAUSEA ONLY  Medications:    No current facility-administered medications on file prior to encounter.    Current Outpatient Prescriptions on File Prior to Encounter:  LISINOPRIL 5 MG Oral Tab TAKE 1 TABLET(5 MG) BY MOUTH ALB  4.0   NA  139   K  4.0   CL  105   CO2  27.0   ALKPHO  82   AST  15   ALT  23   BILT  0.6   TP  7.4     No results for input(s): PTP, INR in the last 168 hours.   Recent Labs   Lab  07/14/18 1942   TROP  <0.046     Imaging: Imaging data reviewed in

## 2018-07-15 NOTE — PLAN OF CARE
Pt was admitted from ER, currently denies any symptoms. Denies chest pain or dyspnea. Room air. SR on tele. Up ad blu. Will monitor.

## 2018-07-15 NOTE — ED PROVIDER NOTES
Patient Seen in: [facility] Emergency Department    History   Patient presents with:  Fatigue (constitutional, neurologic)    Stated Complaint: weakness    HPI    73-year-old male comes in the hospital to complaint of having an episode today where h above.    Physical Exam   ED Triage Vitals [07/14/18 1932]  BP: 142/75  Pulse: 67  Resp: 15  Temp: 97.3 °F (36.3 °C)  Temp src: Temporal  SpO2: 99 %  O2 Device: None (Room air)    Current:/75   Pulse 67   Temp 97.3 °F (36.3 °C) (Temporal)   Resp 15 by Technologist)  Patient is here with complaints of weakness and dizziness. Patient felt as if her were to have a syncopal episode earlier today. Patient states he had similar symptoms earlier this week. FINDINGS:  The heart is normal in size.   [facility]

## 2018-07-15 NOTE — PROGRESS NOTES
[facility]  Progress Note     Umatillaciara Britt Patient Status:  Observation    1950 MRN [id number]   [facility] 2NE-A Attending Raul Felix MD   Hosp Day # 0 PCP Jodi Barreto MD     Chief Complaint: nearsyncope    S: Patient de pending    Plan of care: patient was sent by pcp to Dr Patricio Rice:  · DVT Prophylaxis: scd  · CODE status: full  · Guerrero: no  · Central line: no    Estimated date of discharge: am?  Discharge is dependent on: progress  At this point Mr. Leola Thibodeaux is

## 2018-07-15 NOTE — PLAN OF CARE
Patient/Family Goals    • Patient/Family Long Term Goal Progressing    • Patient/Family Short Term Goal Progressing          HX: GERD, htn    Patient was outside earlier today felt warm and flushed and shaky, no LOC, strong family hx of heart disease.  Card

## 2018-07-15 NOTE — CONSULTS
[facility] Cardiology  Consultation Note      Eliana Miller Patient Status:  Observation    1950 MRN [id number]   [facility] 2NE-A Attending David Kenney MD   Hosp Day # 0 PCP Nessa Parker MD     Reason for consultation:  P PRN   ibuprofen (MOTRIN) tab 200 mg 200 mg Oral Q4H PRN   Or      ibuprofen (MOTRIN) tab 400 mg 400 mg Oral Q4H PRN   Or      ibuprofen (MOTRIN) tab 600 mg 600 mg Oral Q4H PRN       Past Medical History:   Diagnosis Date   • Actinic keratosis    • Esophage from Last 3 Encounters:  07/14/18 : 175 lb 14.8 oz (79.8 kg)  06/19/18 : 167 lb (75.8 kg)  03/23/18 : 170 lb (77.1 kg)      General: Awake and alert; in no acute distress  HEENT: Extraocular movements are intact; sclerae are anicteric; scalp is atrauamatic

## 2018-07-16 ENCOUNTER — APPOINTMENT (OUTPATIENT)
Dept: CV DIAGNOSTICS | Facility: HOSPITAL | Age: 68
End: 2018-07-16
Attending: INTERNAL MEDICINE
Payer: COMMERCIAL

## 2018-07-16 VITALS
SYSTOLIC BLOOD PRESSURE: 131 MMHG | DIASTOLIC BLOOD PRESSURE: 81 MMHG | RESPIRATION RATE: 20 BRPM | OXYGEN SATURATION: 95 % | WEIGHT: 175.94 LBS | BODY MASS INDEX: 24.63 KG/M2 | HEIGHT: 71 IN | HEART RATE: 76 BPM | TEMPERATURE: 98 F

## 2018-07-16 PROCEDURE — 78452 HT MUSCLE IMAGE SPECT MULT: CPT | Performed by: INTERNAL MEDICINE

## 2018-07-16 PROCEDURE — 93017 CV STRESS TEST TRACING ONLY: CPT | Performed by: INTERNAL MEDICINE

## 2018-07-16 PROCEDURE — 99217 OBSERVATION CARE DISCHARGE: CPT | Performed by: HOSPITALIST

## 2018-07-16 PROCEDURE — 93018 CV STRESS TEST I&R ONLY: CPT | Performed by: INTERNAL MEDICINE

## 2018-07-16 RX ORDER — LISINOPRIL 10 MG/1
10 TABLET ORAL DAILY
Status: DISCONTINUED | OUTPATIENT
Start: 2018-07-17 | End: 2018-07-16

## 2018-07-16 RX ORDER — ATORVASTATIN CALCIUM 20 MG/1
20 TABLET, FILM COATED ORAL NIGHTLY
Status: DISCONTINUED | OUTPATIENT
Start: 2018-07-16 | End: 2018-07-16

## 2018-07-16 RX ORDER — ATORVASTATIN CALCIUM 20 MG/1
20 TABLET, FILM COATED ORAL NIGHTLY
Qty: 90 TABLET | Refills: 0 | Status: SHIPPED | OUTPATIENT
Start: 2018-07-16 | End: 2018-10-14

## 2018-07-16 NOTE — PLAN OF CARE
Patient/Family Goals    • Patient/Family Long Term Goal Progressing    • Patient/Family Short Term Goal Progressing          Patient to go for stress test tomorrow along with orthostatic blood pressures in the morning. Denies any pain.  Plan of care update

## 2018-07-16 NOTE — PROGRESS NOTES
CARDIAC DIAGNOSTICS PRELIMINARY REPORT    1.0 mm ST segment depression past baseline in presence of RBBB during exercise, returning to baseline in recovery.     Rest: heart rate was 75 BPM, blood pressure was 142/74 mmHg, EKG showed NSR c/ RBBB    Patient e

## 2018-07-16 NOTE — PLAN OF CARE
Assumed patient care a 0730. Vital signs stable. Patient alert and oriented x 4. Patient denies pain or shortness of breath. Orders received for stress test tomorrow. Patient and family updated on plan of care. All questions answered.

## 2018-07-16 NOTE — PROGRESS NOTES
[facility] Cardiology  Progress Note    Mykel Polanco Patient Status:  Observation    1950 MRN [id number]   [facility] 2NE-A Attending Hilda Ko MD   Hosp Day # 0 PCP Geovany Vicente MD     Impression:  1. Presyncope  2.  El Sodium (LOVENOX) 40 MG/0.4ML injection 40 mg 40 mg Subcutaneous Daily   acetaminophen (TYLENOL EXTRA STRENGTH) tab 1,000 mg 1,000 mg Oral Q6H PRN   ibuprofen (MOTRIN) tab 200 mg 200 mg Oral Q4H PRN   Or      ibuprofen (MOTRIN) tab 400 mg 400 mg Oral Q4H TN

## 2018-07-16 NOTE — PROGRESS NOTES
[facility] [address] [place] [address] Cardiology  Progress Note    Samanta Jacques Patient Status:  Observation    1950 MRN [id number]   [facility] 2NE-A Attending Jesús Scott MD   Hosp Day # 0 PCP Olivia Gustafson MD     Subjective:   Resting comfortably click is appreciated. PMI is nondisplaced with a normal apical impulse. Pulmonary:  Lungs are clear to auscultation bilaterally. There are no focal rales, rhonchi, or wheezes. Good air movement is noted throughout both lung fields. Abdomen:   The ab in setting of extensive coronary calcification  3. Stress pending  4.   If stress negative, plan discharge with event monitor    Harrison Solorzano MD  7/16/2018  1:28 PM

## 2018-07-16 NOTE — DISCHARGE SUMMARY
[facility]  DISCHARGE SUMMARY     Rossana John Patient Status:  Observation    1950 MRN [id number]   [facility] 2NE-A Attending Ru Vee MD   Hosp Day # 0 PCP Rickey Duarte MD     Date of Admission: 2018  Date of Dis 1,000 mg by mouth daily.    Refills:  0        ASK your doctor about these medications      Instructions Prescription details   ergocalciferol 23412 units Caps  Commonly known as:  DRISDOL/VITAMIN D2      Take 1 capsule (50,000 Units total) by mouth once a

## 2018-07-31 PROBLEM — I45.10 RBBB: Status: ACTIVE | Noted: 2018-07-31

## 2018-07-31 PROBLEM — I25.10 ATHEROSCLEROSIS OF NATIVE CORONARY ARTERY OF NATIVE HEART, ANGINA PRESENCE UNSPECIFIED: Status: ACTIVE | Noted: 2018-07-31

## 2018-07-31 PROBLEM — E78.00 PURE HYPERCHOLESTEROLEMIA: Status: ACTIVE | Noted: 2018-07-31

## 2018-07-31 PROBLEM — R93.1 ABNORMAL HEART SCORE CT: Status: ACTIVE | Noted: 2018-07-31

## 2018-08-08 ENCOUNTER — OFFICE VISIT (OUTPATIENT)
Dept: FAMILY MEDICINE CLINIC | Facility: CLINIC | Age: 68
End: 2018-08-08
Payer: COMMERCIAL

## 2018-08-08 VITALS
DIASTOLIC BLOOD PRESSURE: 70 MMHG | WEIGHT: 174 LBS | RESPIRATION RATE: 16 BRPM | SYSTOLIC BLOOD PRESSURE: 122 MMHG | BODY MASS INDEX: 25 KG/M2 | TEMPERATURE: 99 F | HEART RATE: 86 BPM

## 2018-08-08 DIAGNOSIS — I10 ESSENTIAL HYPERTENSION: ICD-10-CM

## 2018-08-08 DIAGNOSIS — E78.00 PURE HYPERCHOLESTEROLEMIA: ICD-10-CM

## 2018-08-08 DIAGNOSIS — K22.70 BARRETT'S ESOPHAGUS WITHOUT DYSPLASIA: ICD-10-CM

## 2018-08-08 DIAGNOSIS — Z86.010 PERSONAL HISTORY OF COLONIC POLYPS: ICD-10-CM

## 2018-08-08 DIAGNOSIS — IMO0001 NORMAL CARDIAC STRESS TEST: ICD-10-CM

## 2018-08-08 DIAGNOSIS — K21.00 GASTROESOPHAGEAL REFLUX DISEASE WITH ESOPHAGITIS: ICD-10-CM

## 2018-08-08 DIAGNOSIS — Z92.89 H/O ECHOCARDIOGRAM: ICD-10-CM

## 2018-08-08 DIAGNOSIS — I45.10 RBBB: ICD-10-CM

## 2018-08-08 DIAGNOSIS — I25.10 ATHEROSCLEROSIS OF NATIVE CORONARY ARTERY OF NATIVE HEART, ANGINA PRESENCE UNSPECIFIED: Primary | ICD-10-CM

## 2018-08-08 DIAGNOSIS — J84.9 INTERSTITIAL LUNG DISEASE (HCC): ICD-10-CM

## 2018-08-08 DIAGNOSIS — N40.0 BENIGN PROSTATIC HYPERPLASIA, UNSPECIFIED WHETHER LOWER URINARY TRACT SYMPTOMS PRESENT: ICD-10-CM

## 2018-08-08 DIAGNOSIS — R93.1 ABNORMAL HEART SCORE CT: ICD-10-CM

## 2018-08-08 PROCEDURE — 99214 OFFICE O/P EST MOD 30 MIN: CPT | Performed by: FAMILY MEDICINE

## 2018-08-08 RX ORDER — TAMSULOSIN HYDROCHLORIDE 0.4 MG/1
CAPSULE ORAL
Qty: 180 CAPSULE | Refills: 3 | Status: SHIPPED | OUTPATIENT
Start: 2018-08-08 | End: 2018-10-14

## 2018-08-08 RX ORDER — FLUTICASONE PROPIONATE 50 MCG
2 SPRAY, SUSPENSION (ML) NASAL DAILY
Qty: 3 BOTTLE | Refills: 3 | Status: SHIPPED | OUTPATIENT
Start: 2018-08-08 | End: 2018-10-14

## 2018-08-08 RX ORDER — FLUTICASONE PROPIONATE 50 MCG
2 SPRAY, SUSPENSION (ML) NASAL DAILY
Refills: 4 | COMMUNITY
Start: 2018-06-22 | End: 2018-08-08

## 2018-08-08 RX ORDER — OMEPRAZOLE 20 MG/1
CAPSULE, DELAYED RELEASE ORAL
Qty: 90 CAPSULE | Refills: 3 | Status: SHIPPED | OUTPATIENT
Start: 2018-08-08 | End: 2018-10-14

## 2018-08-08 NOTE — PROGRESS NOTES
HPI:    Patient ID: Samanta Jacques is a 77year old male. Pt here for \"med f/u. \"    He has been following with Dr. Karlie Lopez cardiologist since a hospital obs stay in July.   Wearing a cardiac monitor now for f/u on the episode that took him to ED at [facility] Allergies:  Amoxicillin-Pot Cla*    DIARRHEA, NAUSEA ONLY   PHYSICAL EXAM:   Physical Exam   Vitals reviewed. Constitutional: He is oriented to person, place, and time. He appears well-developed and well-nourished.    HENT:   Head: Normocephalic and a HCl 0.4 MG Oral Cap 180 capsule 3      Sig: TAKE 1 TO 2 CAPSULES(0.4 TO 0.8 MG) BY MOUTH DAILY           Imaging & Referrals:  None         GF#[id number]

## 2018-08-13 PROBLEM — J84.9 INTERSTITIAL LUNG DISEASE (HCC): Status: ACTIVE | Noted: 2018-08-13

## 2018-08-13 PROBLEM — Z92.89 H/O ECHOCARDIOGRAM: Status: ACTIVE | Noted: 2018-08-13

## 2018-08-13 PROBLEM — IMO0001 NORMAL CARDIAC STRESS TEST: Status: ACTIVE | Noted: 2018-08-13

## 2018-08-15 ENCOUNTER — APPOINTMENT (OUTPATIENT)
Dept: LAB | Age: 68
End: 2018-08-15
Attending: INTERNAL MEDICINE
Payer: MEDICARE

## 2018-08-15 DIAGNOSIS — I25.10 ATHEROSCLEROSIS OF NATIVE CORONARY ARTERY OF NATIVE HEART, ANGINA PRESENCE UNSPECIFIED: ICD-10-CM

## 2018-08-15 DIAGNOSIS — I10 ESSENTIAL HYPERTENSION: ICD-10-CM

## 2018-08-15 DIAGNOSIS — I45.10 RBBB: ICD-10-CM

## 2018-08-15 DIAGNOSIS — R42 EPISODIC LIGHTHEADEDNESS: ICD-10-CM

## 2018-08-15 DIAGNOSIS — R93.1 ABNORMAL HEART SCORE CT: ICD-10-CM

## 2018-08-15 DIAGNOSIS — E78.00 PURE HYPERCHOLESTEROLEMIA: ICD-10-CM

## 2018-08-15 LAB
ALBUMIN SERPL-MCNC: 3.9 G/DL (ref 3.5–4.8)
ALBUMIN/GLOB SERPL: 1.2 {RATIO} (ref 1–2)
ALP LIVER SERPL-CCNC: 84 U/L (ref 45–117)
ALT SERPL-CCNC: 21 U/L (ref 17–63)
ANION GAP SERPL CALC-SCNC: 6 MMOL/L (ref 0–18)
AST SERPL-CCNC: 12 U/L (ref 15–41)
BILIRUB SERPL-MCNC: 1 MG/DL (ref 0.1–2)
BUN BLD-MCNC: 17 MG/DL (ref 8–20)
BUN/CREAT SERPL: 16.7 (ref 10–20)
CALCIUM BLD-MCNC: 9.4 MG/DL (ref 8.3–10.3)
CHLORIDE SERPL-SCNC: 108 MMOL/L (ref 101–111)
CHOLEST SMN-MCNC: 108 MG/DL (ref ?–200)
CO2 SERPL-SCNC: 28 MMOL/L (ref 22–32)
CREAT BLD-MCNC: 1.02 MG/DL (ref 0.7–1.3)
GLOBULIN PLAS-MCNC: 3.2 G/DL (ref 2.5–3.7)
GLUCOSE BLD-MCNC: 109 MG/DL (ref 70–99)
HDLC SERPL-MCNC: 48 MG/DL (ref 40–59)
LDLC SERPL CALC-MCNC: 45 MG/DL (ref ?–100)
M PROTEIN MFR SERPL ELPH: 7.1 G/DL (ref 6.1–8.3)
NONHDLC SERPL-MCNC: 60 MG/DL (ref ?–130)
OSMOLALITY SERPL CALC.SUM OF ELEC: 296 MOSM/KG (ref 275–295)
POTASSIUM SERPL-SCNC: 4.3 MMOL/L (ref 3.6–5.1)
SODIUM SERPL-SCNC: 142 MMOL/L (ref 136–144)
TRIGL SERPL-MCNC: 75 MG/DL (ref 30–149)
VLDLC SERPL CALC-MCNC: 15 MG/DL (ref 0–30)

## 2018-08-15 PROCEDURE — 36415 COLL VENOUS BLD VENIPUNCTURE: CPT

## 2018-08-15 PROCEDURE — 80053 COMPREHEN METABOLIC PANEL: CPT

## 2018-08-15 PROCEDURE — 80061 LIPID PANEL: CPT

## 2018-09-19 ENCOUNTER — TELEPHONE (OUTPATIENT)
Dept: FAMILY MEDICINE CLINIC | Facility: CLINIC | Age: 68
End: 2018-09-19

## 2018-09-19 ENCOUNTER — OFFICE VISIT (OUTPATIENT)
Dept: FAMILY MEDICINE CLINIC | Facility: CLINIC | Age: 68
End: 2018-09-19
Payer: MEDICARE

## 2018-09-19 VITALS
DIASTOLIC BLOOD PRESSURE: 76 MMHG | HEART RATE: 68 BPM | SYSTOLIC BLOOD PRESSURE: 130 MMHG | TEMPERATURE: 99 F | WEIGHT: 175.63 LBS | BODY MASS INDEX: 25 KG/M2 | RESPIRATION RATE: 14 BRPM

## 2018-09-19 DIAGNOSIS — J30.1 SEASONAL ALLERGIC RHINITIS DUE TO POLLEN: ICD-10-CM

## 2018-09-19 DIAGNOSIS — Z23 NEED FOR VACCINATION: ICD-10-CM

## 2018-09-19 DIAGNOSIS — J01.40 ACUTE NON-RECURRENT PANSINUSITIS: Primary | ICD-10-CM

## 2018-09-19 PROCEDURE — 99214 OFFICE O/P EST MOD 30 MIN: CPT | Performed by: FAMILY MEDICINE

## 2018-09-19 PROCEDURE — 90653 IIV ADJUVANT VACCINE IM: CPT | Performed by: FAMILY MEDICINE

## 2018-09-19 PROCEDURE — G0008 ADMIN INFLUENZA VIRUS VAC: HCPCS | Performed by: FAMILY MEDICINE

## 2018-09-19 RX ORDER — CEFDINIR 300 MG/1
300 CAPSULE ORAL 2 TIMES DAILY
Qty: 14 CAPSULE | Refills: 0 | Status: SHIPPED | OUTPATIENT
Start: 2018-09-19 | End: 2018-09-26

## 2018-09-19 NOTE — PROGRESS NOTES
HPI:   Pratik Carranza is a 77year old male who presents for upper respiratory symptoms for 7 days.  Symptoms include mostly post nasal drip with cough at night, daytime not too bad except eyes feel a bit puffy and irritated, but not coughing during day, b [address];                 Surgeon: Meredith Rubio MD;  Location: [address] [place], [place] [address]  7/20/2017: ESOPHAGOGASTRODUODENOSCOPY, COLONOSCOPY, POSSIBLE BIOPSY,   POSSIBLE POLYPECTOMY [address] [facility], [address]; N/A      Comment:  Performed by Ravinder Mendoza (benadryl) and/or decongestant in am (sudafed for up to 4 days), saline sinus rinse; salt water gargles and tea with honey for sore throat, can cont allergy meds. The patient is asked to call if no better in 5-7 days or if worsening symptoms.   The patie

## 2018-09-19 NOTE — TELEPHONE ENCOUNTER
PT CALLED AND ADV THAT EVERY YEAR HE COMES DOWN WITH DRAINAGE AND NAGGING COUGH. PT HAS BE TAKING ALLEGRA- NOT HELPING AT ALL.     PT LEAVING FOR [facility] TOMORROW AND WOULD LIKE TO KNOW IF THERE IS ANYTHING THAT CAN BE PRESCRIBED    PT ADV THAT HE COUGHS

## 2018-09-19 NOTE — TELEPHONE ENCOUNTER
Pt called back and appt scheduled  Future Appointments   Date Time Provider Irene Naima   9/19/2018  5:00 PM Zain Horn MD [facility] Raul Veena   11/2/2018  2:40 PM MD LALI Bernal   2/1/2019 10:30 AM Zain Horn MD [facility]

## 2018-09-19 NOTE — TELEPHONE ENCOUNTER
Spoke with the pt and he states that his head is clogged and it runs down his throat    Taking allegra all summer- he states that these symptoms are worse in Spring and Fall    He states that the post nasal drip continues to run down his throat and it is c

## 2018-09-27 ENCOUNTER — TELEPHONE (OUTPATIENT)
Dept: FAMILY MEDICINE CLINIC | Facility: CLINIC | Age: 68
End: 2018-09-27

## 2018-09-27 NOTE — TELEPHONE ENCOUNTER
Call from patient. Last week was seen by Dr Emmie English for sinus infection. Was prescribed cefdinir BID which helped a little. Patient finished medication either Tuesday night or Wednesday morning. Since then, his symptoms have gotten worse, but no new symptoms.

## 2018-09-27 NOTE — TELEPHONE ENCOUNTER
Have him try  Afrin 2 sprays bid x 3 days only, otherwise rebound congestion may occur. Do this in addition to his current nasal spray. Hopefully this will clear his symptoms fully. F/u with Dr. Duran Velasco Monday if not better. Thanks so much.

## 2018-10-01 ENCOUNTER — TELEPHONE (OUTPATIENT)
Dept: FAMILY MEDICINE CLINIC | Facility: CLINIC | Age: 68
End: 2018-10-01

## 2018-10-01 RX ORDER — AZITHROMYCIN 250 MG/1
TABLET, FILM COATED ORAL
Qty: 6 TABLET | Refills: 0 | Status: SHIPPED | OUTPATIENT
Start: 2018-10-01 | End: 2018-11-02 | Stop reason: ALTCHOICE

## 2018-10-01 NOTE — TELEPHONE ENCOUNTER
Pt tried the Afrin, as directed, it gave a bit of relief, but pt is still having issues. Mostly at night has drainage that makes him cough. What is next step?

## 2018-10-01 NOTE — TELEPHONE ENCOUNTER
Spoke with the pt and advised of the notes from Dr. Carlos Rosenberg and he v/u. He asked about the sudafed and if it is ok with his heart meds- I advised that yes it is ok but to only use it for 5 days.  He v/u    abx sent tot the pharmacy

## 2018-10-01 NOTE — TELEPHONE ENCOUNTER
Still using flonase daily and allegra daily. He did finish the abx and he states that he was feeling a little better but it came right back after the abx was done.       He is not feeling sick- he is feeling congested in the head and during the day he is ab

## 2018-10-01 NOTE — TELEPHONE ENCOUNTER
Is he doing antihistamine and flonase daily as well and did he finish abx? Does he feel \"sick\"--feverish, chills, achy, more tired than usual, face pain?

## 2018-10-01 NOTE — TELEPHONE ENCOUNTER
Tough call, could still be some sinusitis, may be all allergies.   Let's try zpak, continue antihistamine and nasal steroid spray, and add sudafed for 5 days (no more afrin)

## 2018-10-13 DIAGNOSIS — Z86.010 PERSONAL HISTORY OF COLONIC POLYPS: ICD-10-CM

## 2018-10-13 DIAGNOSIS — I25.10 ATHEROSCLEROSIS OF NATIVE CORONARY ARTERY OF NATIVE HEART, ANGINA PRESENCE UNSPECIFIED: ICD-10-CM

## 2018-10-13 DIAGNOSIS — I10 ESSENTIAL HYPERTENSION: ICD-10-CM

## 2018-10-13 DIAGNOSIS — E78.00 PURE HYPERCHOLESTEROLEMIA: ICD-10-CM

## 2018-10-13 DIAGNOSIS — R42 EPISODIC LIGHTHEADEDNESS: ICD-10-CM

## 2018-10-13 DIAGNOSIS — K22.70 BARRETT'S ESOPHAGUS WITHOUT DYSPLASIA: ICD-10-CM

## 2018-10-13 DIAGNOSIS — R93.1 ABNORMAL HEART SCORE CT: ICD-10-CM

## 2018-10-13 DIAGNOSIS — I45.10 RBBB: ICD-10-CM

## 2018-10-13 DIAGNOSIS — K21.00 GASTROESOPHAGEAL REFLUX DISEASE WITH ESOPHAGITIS: ICD-10-CM

## 2018-10-13 NOTE — TELEPHONE ENCOUNTER
[facility] [phone], [phone]    lisinopril 10 MG Oral Tab  Fluticasone Propionate 50 MCG/ACT Nasal Suspension  omeprazole 20 MG Oral Capsule Delayed Release  tamsulosin HCl 0.4 MG Oral Cap

## 2018-10-13 NOTE — TELEPHONE ENCOUNTER
Lisinopril last refilled on 7/31/18 for # 90 with 3 refills  Fluticasone filled 8/8/18 # 3 bottle with 3 refills  Omeprazole filled 8/8/18 #90 with 3 results  tamsulosin filled 8/8/18 #180 with 3 refills  Atorvastatin filled 7/16/18 #90 with 0 refills  Las

## 2018-10-14 RX ORDER — ATORVASTATIN CALCIUM 20 MG/1
20 TABLET, FILM COATED ORAL NIGHTLY
Qty: 90 TABLET | Refills: 3 | Status: SHIPPED | OUTPATIENT
Start: 2018-10-14 | End: 2018-10-29

## 2018-10-14 RX ORDER — TAMSULOSIN HYDROCHLORIDE 0.4 MG/1
CAPSULE ORAL
Qty: 180 CAPSULE | Refills: 3 | Status: SHIPPED | OUTPATIENT
Start: 2018-10-14 | End: 2019-09-16 | Stop reason: ALTCHOICE

## 2018-10-14 RX ORDER — OMEPRAZOLE 20 MG/1
CAPSULE, DELAYED RELEASE ORAL
Qty: 90 CAPSULE | Refills: 3 | Status: SHIPPED | OUTPATIENT
Start: 2018-10-14 | End: 2019-06-21

## 2018-10-14 RX ORDER — FLUTICASONE PROPIONATE 50 MCG
2 SPRAY, SUSPENSION (ML) NASAL DAILY
Qty: 3 BOTTLE | Refills: 3 | Status: SHIPPED | OUTPATIENT
Start: 2018-10-14 | End: 2019-09-16 | Stop reason: ALTCHOICE

## 2018-10-14 RX ORDER — LISINOPRIL 10 MG/1
10 TABLET ORAL DAILY
Qty: 90 TABLET | Refills: 3 | Status: SHIPPED | OUTPATIENT
Start: 2018-10-14 | End: 2019-06-21

## 2018-10-26 ENCOUNTER — TELEPHONE (OUTPATIENT)
Dept: FAMILY MEDICINE CLINIC | Facility: CLINIC | Age: 68
End: 2018-10-26

## 2018-10-26 NOTE — TELEPHONE ENCOUNTER
Patient states that he picked up the mail and all of the medications were there from [address] except for the Atorvastatin.  Advised that per our records [facility] received the refill request from Dr Emmie English on 10/14/18 at 9:29 am. Advised patient

## 2018-10-26 NOTE — TELEPHONE ENCOUNTER
Osmar Tapia from [facility] called, did we send a script to [facility] that should have gone to [facility]? Pt thinks script should have gone to [facility] but they did not receive anything from us. Plesase call Osmar Tapia at [phone].

## 2018-10-26 NOTE — TELEPHONE ENCOUNTER
See other tel enc from 10/26/18. Called Araceli ma and [facility] and advised. Verbalized understanding. No further questions.

## 2018-10-29 ENCOUNTER — TELEPHONE (OUTPATIENT)
Dept: FAMILY MEDICINE CLINIC | Facility: CLINIC | Age: 68
End: 2018-10-29

## 2018-10-29 RX ORDER — ATORVASTATIN CALCIUM 20 MG/1
20 TABLET, FILM COATED ORAL NIGHTLY
Qty: 90 TABLET | Refills: 3 | Status: SHIPPED | OUTPATIENT
Start: 2018-10-29 | End: 2019-10-14

## 2018-10-29 NOTE — TELEPHONE ENCOUNTER
I spoke with the patient, the order was actually sent to [facility]. The patient wants to p/u @ [facility]. He called [facility] this am and was told we needed to send the prescription to [facility]. Pres[facility] to [facility]..  I

## 2018-10-29 NOTE — TELEPHONE ENCOUNTER
Pt called, he has been trying to get a prescription filled for 2 weeks now and we need to contact [facility] with authorization. Can we please call [facility]? This is the medication he needs filled right away atorvastatin 20 MG Oral Tab.   [facility] told p

## 2018-11-09 ENCOUNTER — HOSPITAL ENCOUNTER (OUTPATIENT)
Age: 68
Discharge: HOME | End: 2018-11-09
Attending: FAMILY MEDICINE
Payer: MEDICARE

## 2018-11-09 VITALS
HEIGHT: 69 IN | RESPIRATION RATE: 18 BRPM | DIASTOLIC BLOOD PRESSURE: 84 MMHG | SYSTOLIC BLOOD PRESSURE: 138 MMHG | TEMPERATURE: 98 F | WEIGHT: 173 LBS | BODY MASS INDEX: 25.62 KG/M2 | OXYGEN SATURATION: 96 % | HEART RATE: 76 BPM

## 2018-11-09 DIAGNOSIS — J98.01 ACUTE BRONCHOSPASM: Primary | ICD-10-CM

## 2018-11-09 DIAGNOSIS — J06.9 VIRAL UPPER RESPIRATORY TRACT INFECTION: ICD-10-CM

## 2018-11-09 PROCEDURE — 99214 OFFICE O/P EST MOD 30 MIN: CPT

## 2018-11-09 PROCEDURE — 94640 AIRWAY INHALATION TREATMENT: CPT

## 2018-11-09 RX ORDER — PREDNISONE 20 MG/1
TABLET ORAL
Qty: 8 TABLET | Refills: 0 | Status: SHIPPED | OUTPATIENT
Start: 2018-11-09 | End: 2019-01-02 | Stop reason: ALTCHOICE

## 2018-11-09 RX ORDER — PREDNISONE 20 MG/1
40 TABLET ORAL ONCE
Status: COMPLETED | OUTPATIENT
Start: 2018-11-09 | End: 2018-11-09

## 2018-11-09 RX ORDER — ALBUTEROL SULFATE 90 UG/1
2 AEROSOL, METERED RESPIRATORY (INHALATION) EVERY 4 HOURS PRN
Qty: 1 INHALER | Refills: 0 | Status: SHIPPED | OUTPATIENT
Start: 2018-11-09 | End: 2019-03-11

## 2018-11-09 RX ORDER — IPRATROPIUM BROMIDE AND ALBUTEROL SULFATE 2.5; .5 MG/3ML; MG/3ML
3 SOLUTION RESPIRATORY (INHALATION) ONCE
Status: COMPLETED | OUTPATIENT
Start: 2018-11-09 | End: 2018-11-09

## 2018-11-09 NOTE — ED PROVIDER NOTES
Patient Seen in: [address] [facility]    History   Patient presents with:  Cough/URI    Stated Complaint: cough/wheezing    HPI    This 68-year-old male presents to the office with complaint of cough and wheezing for the last week.   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   He is given prescriptions for an albuterol inhaler 2 puffs every 4 hours as needed for any chest tightness, shortness of breath or wheezing and prednisone 20 mg tablets 2 tablets once daily with breakfast for 4 additi Follow-up with your primary doctor in 3-5 days if not improving.

## 2018-11-09 NOTE — ED INITIAL ASSESSMENT (HPI)
Patient c/o wheezing and coughing for a week. No fever. Pt has been taking coricidin without relief.

## 2018-12-14 ENCOUNTER — HOSPITAL ENCOUNTER (OUTPATIENT)
Dept: CT IMAGING | Facility: HOSPITAL | Age: 68
Discharge: HOME | End: 2018-12-14
Attending: INTERNAL MEDICINE
Payer: MEDICARE

## 2018-12-14 DIAGNOSIS — J84.9 INTERSTITIAL LUNG DISEASE (HCC): ICD-10-CM

## 2018-12-14 PROCEDURE — 71250 CT THORAX DX C-: CPT | Performed by: INTERNAL MEDICINE

## 2018-12-18 NOTE — PROGRESS NOTES
Please let pt know that his CT scan was stable compared to the scan done in April. He has minimal scarring in the apices and the hazy changes in the outer aspects of his lungs that we reviewed previously have not changed appreciably in the last 8 mths.   Raymon Spencer

## 2018-12-21 NOTE — PROGRESS NOTES
Spoke with patient, informed of results and that from [facility] no further imaging is needed unless patient has developed new or worsening symptoms. Patient verbalizes understanding. Advised to call back with any questions.

## 2019-01-04 ENCOUNTER — HOSPITAL ENCOUNTER (OUTPATIENT)
Age: 69
Discharge: HOME | End: 2019-01-04
Payer: MEDICARE

## 2019-01-04 VITALS
RESPIRATION RATE: 18 BRPM | HEART RATE: 88 BPM | DIASTOLIC BLOOD PRESSURE: 83 MMHG | OXYGEN SATURATION: 97 % | TEMPERATURE: 99 F | HEIGHT: 71 IN | WEIGHT: 172 LBS | BODY MASS INDEX: 24.08 KG/M2 | SYSTOLIC BLOOD PRESSURE: 130 MMHG

## 2019-01-04 DIAGNOSIS — J01.10 ACUTE NON-RECURRENT FRONTAL SINUSITIS: Primary | ICD-10-CM

## 2019-01-04 PROCEDURE — 99214 OFFICE O/P EST MOD 30 MIN: CPT

## 2019-01-04 PROCEDURE — 99213 OFFICE O/P EST LOW 20 MIN: CPT

## 2019-01-04 RX ORDER — DOXYCYCLINE HYCLATE 100 MG/1
100 CAPSULE ORAL 2 TIMES DAILY
Qty: 20 CAPSULE | Refills: 0 | Status: SHIPPED | OUTPATIENT
Start: 2019-01-04 | End: 2019-01-14

## 2019-01-04 NOTE — ED INITIAL ASSESSMENT (HPI)
Pt presents today with c/o productive cough with yellow sputum and sinus headache x 1.5-2 weeks. Pt denies any fevers.

## 2019-01-04 NOTE — ED PROVIDER NOTES
Patient Seen in: [address] [facility]    History   Patient presents with:  Cough/URI  Sinus Problem    Stated Complaint: SINUS/COUGH     HPI    Pk Glaser is a 77-year-old male who presents today for evaluation of sinus congestion and cough.   He has except as noted above.     Physical Exam     ED Triage Vitals [01/04/19 1152]   /83   Pulse 88   Resp 18   Temp 99.1 °F (37.3 °C)   Temp src Temporal   SpO2 97 %   O2 Device None (Room air)       Current:/83   Pulse 88   Temp 99.1 °F (37.3 °C) ( discussed. The patient is in good condition throughout the visit today and remains so upon discharge. I discuss the plan of care with the patient, who expresses understanding.   All questions and concerns are addressed to the patient's satisfaction prior t

## 2019-01-22 ENCOUNTER — OFFICE VISIT (OUTPATIENT)
Dept: FAMILY MEDICINE CLINIC | Facility: CLINIC | Age: 69
End: 2019-01-22
Payer: MEDICARE

## 2019-01-22 VITALS
RESPIRATION RATE: 14 BRPM | DIASTOLIC BLOOD PRESSURE: 82 MMHG | BODY MASS INDEX: 26.1 KG/M2 | HEIGHT: 69 IN | WEIGHT: 176.19 LBS | TEMPERATURE: 98 F | SYSTOLIC BLOOD PRESSURE: 138 MMHG | HEART RATE: 80 BPM

## 2019-01-22 DIAGNOSIS — Z13.31 DEPRESSION SCREENING: ICD-10-CM

## 2019-01-22 DIAGNOSIS — Z86.010 PERSONAL HISTORY OF COLONIC POLYPS: ICD-10-CM

## 2019-01-22 DIAGNOSIS — E78.00 PURE HYPERCHOLESTEROLEMIA: ICD-10-CM

## 2019-01-22 DIAGNOSIS — R68.89 OTHER GENERAL SYMPTOMS AND SIGNS: ICD-10-CM

## 2019-01-22 DIAGNOSIS — Z11.59 NEED FOR HEPATITIS C SCREENING TEST: ICD-10-CM

## 2019-01-22 DIAGNOSIS — R93.1 ABNORMAL HEART SCORE CT: ICD-10-CM

## 2019-01-22 DIAGNOSIS — Z00.00 ENCOUNTER FOR ANNUAL HEALTH EXAMINATION: Primary | ICD-10-CM

## 2019-01-22 DIAGNOSIS — R35.1 BENIGN PROSTATIC HYPERPLASIA WITH NOCTURIA: ICD-10-CM

## 2019-01-22 DIAGNOSIS — I45.10 RBBB: ICD-10-CM

## 2019-01-22 DIAGNOSIS — I10 ESSENTIAL HYPERTENSION: ICD-10-CM

## 2019-01-22 DIAGNOSIS — Z92.89 H/O ECHOCARDIOGRAM: ICD-10-CM

## 2019-01-22 DIAGNOSIS — K22.70 BARRETT'S ESOPHAGUS WITHOUT DYSPLASIA: ICD-10-CM

## 2019-01-22 DIAGNOSIS — R79.89 LOW TSH LEVEL: ICD-10-CM

## 2019-01-22 DIAGNOSIS — J84.9 INTERSTITIAL LUNG DISEASE (HCC): ICD-10-CM

## 2019-01-22 DIAGNOSIS — I25.10 ATHEROSCLEROSIS OF NATIVE CORONARY ARTERY OF NATIVE HEART, ANGINA PRESENCE UNSPECIFIED: ICD-10-CM

## 2019-01-22 DIAGNOSIS — N40.1 BENIGN PROSTATIC HYPERPLASIA WITH NOCTURIA: ICD-10-CM

## 2019-01-22 DIAGNOSIS — K21.9 GASTROESOPHAGEAL REFLUX DISEASE WITHOUT ESOPHAGITIS: ICD-10-CM

## 2019-01-22 LAB
HCV AB SERPL QL IA: NONREACTIVE
TSI SER-ACNC: 0.43 MIU/ML (ref 0.35–5.5)

## 2019-01-22 PROCEDURE — 36415 COLL VENOUS BLD VENIPUNCTURE: CPT | Performed by: FAMILY MEDICINE

## 2019-01-22 PROCEDURE — G0439 PPPS, SUBSEQ VISIT: HCPCS | Performed by: FAMILY MEDICINE

## 2019-01-22 PROCEDURE — 84443 ASSAY THYROID STIM HORMONE: CPT | Performed by: FAMILY MEDICINE

## 2019-01-22 PROCEDURE — G0444 DEPRESSION SCREEN ANNUAL: HCPCS | Performed by: FAMILY MEDICINE

## 2019-01-22 PROCEDURE — 86803 HEPATITIS C AB TEST: CPT | Performed by: FAMILY MEDICINE

## 2019-01-22 NOTE — PROGRESS NOTES
HPI:   Gamaliel Campo is a 77year old male who presents for a Medicare Subsequent Annual Wellness visit (Pt already had Initial Annual Wellness). Patient mostly feeling/doing well.   However he had a sinus infection treatd wit 10 days doxy 1/4/19 at weeks)?: Not at all  Feeling down, depressed, or hopeless (over the last two weeks)?: Not at all  PHQ-2 SCORE: 0     Advanced Directive:   He does have a Living Will but we do NOT have it on file in [place].    Not Discussed         He does have a POA but we d Component Value Date    WBC 8.6 07/14/2018    HGB 14.5 07/14/2018    .0 07/14/2018        ALLERGIES:   He is allergic to amoxicillin-pot clavulanate.     CURRENT MEDICATIONS:     Outpatient Medications Marked as Taking for the 1/22/19 encounter (Of skin lesions  EYES: denies blurred vision or double vision  HEENT: denies nasal congestion, sinus pain or ST  LUNGS: denies shortness of breath with exertion, improving cough  CARDIOVASCULAR: denies chest pain on exertion, no heart palps  GI: denies abdomi rub or gallop   Abdomen:   Soft, non-tender, bowel sounds active all four quadrants,  no masses, no organomegaly           Extremities: Extremities normal, atraumatic, no cyanosis or edema   Pulses: 2+ and symmetric   Skin: Skin color, texture, turgor norm controlled, CPM  Benign prostatic hyperplasia with nocturia  -d/w patient keeping food/beverage journal and/or trying elimination of coffee/pop to see if helps with nocturia; in addition he's not sure if tamsulosin helping and worth it, he can try without previous visit. No flowsheet data found. Fecal Occult Blood Annually No results found for: FOB No flowsheet data found. Glaucoma Screening      Ophthalmology Visit Annually: Diabetics, FHx Glaucoma, AA>50, > 65 No flowsheet data found.     Pr

## 2019-01-23 ENCOUNTER — TELEPHONE (OUTPATIENT)
Dept: FAMILY MEDICINE CLINIC | Facility: CLINIC | Age: 69
End: 2019-01-23

## 2019-01-23 NOTE — TELEPHONE ENCOUNTER
----- Message from Alissa Phan MD sent at 1/22/2019  8:27 PM CST -----  Please notify thyroid test now normal and hep c test negative. Please let me know if you have any questions.

## 2019-01-24 ENCOUNTER — IMAGING SERVICES (OUTPATIENT)
Dept: OTHER | Age: 69
End: 2019-01-24

## 2019-03-06 VITALS
HEART RATE: 90 BPM | TEMPERATURE: 98.5 F | HEIGHT: 72 IN | RESPIRATION RATE: 14 BRPM | SYSTOLIC BLOOD PRESSURE: 116 MMHG | DIASTOLIC BLOOD PRESSURE: 62 MMHG | BODY MASS INDEX: 23.57 KG/M2 | OXYGEN SATURATION: 97 % | WEIGHT: 174 LBS

## 2019-03-11 ENCOUNTER — APPOINTMENT (OUTPATIENT)
Dept: GENERAL RADIOLOGY | Age: 69
End: 2019-03-11
Attending: NURSE PRACTITIONER
Payer: MEDICARE

## 2019-03-11 ENCOUNTER — HOSPITAL ENCOUNTER (OUTPATIENT)
Age: 69
Discharge: ANOTHER HOSPITAL | End: 2019-03-11
Payer: MEDICARE

## 2019-03-11 VITALS
SYSTOLIC BLOOD PRESSURE: 102 MMHG | WEIGHT: 172 LBS | TEMPERATURE: 99 F | HEART RATE: 60 BPM | DIASTOLIC BLOOD PRESSURE: 65 MMHG | BODY MASS INDEX: 25 KG/M2 | OXYGEN SATURATION: 96 % | RESPIRATION RATE: 16 BRPM

## 2019-03-11 DIAGNOSIS — R00.1 SINUS BRADYCARDIA: ICD-10-CM

## 2019-03-11 DIAGNOSIS — R55 SYNCOPE AND COLLAPSE: Primary | ICD-10-CM

## 2019-03-11 PROCEDURE — 93010 ELECTROCARDIOGRAM REPORT: CPT

## 2019-03-11 PROCEDURE — 99214 OFFICE O/P EST MOD 30 MIN: CPT

## 2019-03-11 PROCEDURE — 71046 X-RAY EXAM CHEST 2 VIEWS: CPT | Performed by: NURSE PRACTITIONER

## 2019-03-11 PROCEDURE — 93005 ELECTROCARDIOGRAM TRACING: CPT

## 2019-03-11 RX ORDER — PREDNISONE 20 MG/1
40 TABLET ORAL ONCE
Status: DISCONTINUED | OUTPATIENT
Start: 2019-03-11 | End: 2019-03-11

## 2019-03-11 RX ORDER — IPRATROPIUM BROMIDE AND ALBUTEROL SULFATE 2.5; .5 MG/3ML; MG/3ML
3 SOLUTION RESPIRATORY (INHALATION) ONCE
Status: DISCONTINUED | OUTPATIENT
Start: 2019-03-11 | End: 2019-03-11

## 2019-03-11 RX ORDER — SODIUM CHLORIDE 9 MG/ML
INJECTION, SOLUTION INTRAVENOUS ONCE
Status: COMPLETED | OUTPATIENT
Start: 2019-03-11 | End: 2019-03-11

## 2019-03-11 NOTE — ED NOTES
1335 pt felt weak, skin pale, placed supine, heart rate 35 beats per min, IV placed right AC, fluids opened per MD, skin pale, pt is slow to respond, paramedic's called 1340, Paramedic here at [phone], see vitals trend,.  Report given to paramedics going to Ru

## 2019-03-11 NOTE — ED INITIAL ASSESSMENT (HPI)
4 days of cough and sinus drainage. States getting worse with sinus congestion. Coughing all day and night. Taking otc meds with no relief.

## 2019-03-11 NOTE — ED PROVIDER NOTES
Patient Seen in: [address] [facility]    History   Patient presents with:  Sinusitis    Stated Complaint: sinus problems    68-year-old male presents today with sinus pressure congestion runny nose and cough which is productive at times.   Stat HPI.  Constitutional and vital signs reviewed. All other systems reviewed and negative except as noted above.     Physical Exam     ED Triage Vitals [03/11/19 1251]   /73   Pulse 91   Resp 16   Temp 99.1 °F (37.3 °C)   Temp src Temporal   SpO2 96 he has had a cough and sinus drainage for the past 4 days. FINDINGS:  Normal heart size and pulmonary vascularity. No pleural effusion or pneumothorax. No lobar consolidation. Stable calcified right paratracheal lymph node.        CONCLUSION:  No active

## 2019-03-13 ENCOUNTER — TELEPHONE (OUTPATIENT)
Dept: FAMILY MEDICINE CLINIC | Facility: CLINIC | Age: 69
End: 2019-03-13

## 2019-03-13 LAB
ATRIAL RATE: 48 BPM
P AXIS: 54 DEGREES
P-R INTERVAL: 176 MS
Q-T INTERVAL: 440 MS
QRS DURATION: 138 MS
QTC CALCULATION (BEZET): 393 MS
R AXIS: 68 DEGREES
T AXIS: 28 DEGREES
VENTRICULAR RATE: 48 BPM

## 2019-03-13 RX ORDER — PROMETHAZINE HYDROCHLORIDE AND CODEINE PHOSPHATE 6.25; 1 MG/5ML; MG/5ML
5 SYRUP ORAL NIGHTLY PRN
Qty: 120 ML | Refills: 0 | Status: SHIPPED
Start: 2019-03-13 | End: 2019-03-19

## 2019-03-13 RX ORDER — BENZONATATE 200 MG/1
200 CAPSULE ORAL EVERY MORNING
Qty: 14 CAPSULE | Refills: 0 | Status: SHIPPED | OUTPATIENT
Start: 2019-03-13 | End: 2019-03-19

## 2019-03-13 NOTE — TELEPHONE ENCOUNTER
Spoke with the pt and advised of the notes from Dr. Emerson Murillo and he v/u he would like the phenergen with codeine and the tessalon

## 2019-03-13 NOTE — TELEPHONE ENCOUNTER
PT CALLED AND ADV THAT HE WENT TO [facility] ON 3/11/19, THEY SHIPPED HIM TO [facility] BY AMBULANCE, THOUGHT PT WAS HAVING A HEART ATTACK. PT ADV HE DID NOT HAVE A HEART ATTACK. TREATED PT FOR SINUS INFECTION. WAS RELEASED 4 HOURS LATER.     PT WAS TO FOLLOW UP

## 2019-03-13 NOTE — TELEPHONE ENCOUNTER
Can try phenergan with codeine 5ml qhs OR benadryl 25-50mg qhs to help with nighttime cough, tessalon pearls 200mg qam for daytime cough, but will take several days for abx to kick in and cough often takes longer to improve than the sinus symptoms.   If dev

## 2019-03-14 ENCOUNTER — TELEPHONE (OUTPATIENT)
Dept: FAMILY MEDICINE CLINIC | Facility: CLINIC | Age: 69
End: 2019-03-14

## 2019-03-14 NOTE — TELEPHONE ENCOUNTER
Pt was prescribe Amoxi clav (?) it is not helping at all. \"has stuff running down his fac\"?  Pls call

## 2019-03-14 NOTE — TELEPHONE ENCOUNTER
Antibiotics usually take 48-72 hours or come in to full effect. I would use over-the-counter nasal saline or afrin for 3 days only to help with the congestion. If symptoms do not get better in the next 3-4 days he should give us a call back. Thanks.

## 2019-03-14 NOTE — TELEPHONE ENCOUNTER
Augmentin not helping, started Monday. Ordered for 10 days Was diagnosed at [facility]. C/O PND, sinus pressure, headache. Head so stuffy he can barely breathe through his nose. He's using flonase and the augmentin.  Reluctant to take anything OTC without advic

## 2019-03-18 ENCOUNTER — TELEPHONE (OUTPATIENT)
Dept: FAMILY MEDICINE CLINIC | Facility: CLINIC | Age: 69
End: 2019-03-18

## 2019-03-18 NOTE — TELEPHONE ENCOUNTER
Pt called, was seen and treated at the [facility], 3/11/19, for cough and drainage in throat. Pt was then sent to [facility] as Urgent Care thought pt had a heart attack which he did not have.    At [place] prescribed pt some medicine for co

## 2019-03-18 NOTE — TELEPHONE ENCOUNTER
Spoke with the pt and advised of the need for an appt  We scheduled for tomorrow AM  Future Appointments   Date Time Provider Irene Mcnamara   3/19/2019 10:30 AM Jessi Wynn [facility] Fabián Charlie   11/1/2019  3:20 PM MD LALI Rangel

## 2019-03-18 NOTE — TELEPHONE ENCOUNTER
He'll need to be seen if not getting better. Make appt with one of the remaining docs in office today or tomorrow.   THANKS

## 2019-03-18 NOTE — TELEPHONE ENCOUNTER
Message   Received:  Today   Message Contents   Leonela Roy Nurse   Caller: Pt (Today, 11:15 AM)             Pt rtng call, Pls call back @ [phone]

## 2019-03-18 NOTE — TELEPHONE ENCOUNTER
Pt called back- still on the abx- augmentin has a day or 2 left      He is a little better    His head is still clogged and he is having PND @ night he is taking the cough syrup with codeine at night because as soon as he lays down he starts to cough    He

## 2019-03-19 ENCOUNTER — OFFICE VISIT (OUTPATIENT)
Dept: FAMILY MEDICINE CLINIC | Facility: CLINIC | Age: 69
End: 2019-03-19
Payer: MEDICARE

## 2019-03-19 VITALS
HEART RATE: 88 BPM | BODY MASS INDEX: 26 KG/M2 | OXYGEN SATURATION: 98 % | TEMPERATURE: 99 F | WEIGHT: 177.81 LBS | DIASTOLIC BLOOD PRESSURE: 70 MMHG | SYSTOLIC BLOOD PRESSURE: 130 MMHG

## 2019-03-19 DIAGNOSIS — J98.01 BRONCHOSPASM: ICD-10-CM

## 2019-03-19 DIAGNOSIS — R05.9 COUGH: ICD-10-CM

## 2019-03-19 DIAGNOSIS — J01.30 ACUTE NON-RECURRENT SPHENOIDAL SINUSITIS: Primary | ICD-10-CM

## 2019-03-19 PROCEDURE — 99214 OFFICE O/P EST MOD 30 MIN: CPT | Performed by: FAMILY MEDICINE

## 2019-03-19 RX ORDER — AMOXICILLIN AND CLAVULANATE POTASSIUM 875; 125 MG/1; MG/1
1 TABLET, FILM COATED ORAL 2 TIMES DAILY
Qty: 14 TABLET | Refills: 0 | Status: SHIPPED | OUTPATIENT
Start: 2019-03-19 | End: 2019-03-26

## 2019-03-19 RX ORDER — METHYLPREDNISOLONE 4 MG/1
TABLET ORAL
Qty: 1 KIT | Refills: 0 | Status: SHIPPED | OUTPATIENT
Start: 2019-03-19 | End: 2019-03-28

## 2019-03-19 RX ORDER — AMOXICILLIN AND CLAVULANATE POTASSIUM 875; 125 MG/1; MG/1
TABLET, FILM COATED ORAL
Refills: 0 | COMMUNITY
Start: 2019-03-11 | End: 2019-03-19

## 2019-03-19 NOTE — PROGRESS NOTES
HPI:   Maryse Leos is a 77year old male who presents for upper respiratory symptoms for  1  weeks. Patient reports sore throat, congestion, yellow colored nasal discharge, cough with yellow colored sputum, cough is keeping pt up at night, sinus pain. 7/20/2017    Performed by Winsome Rojas MD at [facility]   • TONSILLECTOMY     • UPPER GI ENDO POSS MICHELINE  07/2017    [facility]; HH- repeat 3 yr (no dysplasia)      Family History   Problem Relation Age of Onset   • Cancer Father Refills   • Amoxicillin-Pot Clavulanate 875-125 MG Oral Tab 14 tablet 0     Sig: Take 1 tablet by mouth 2 (two) times daily for 7 days. • methylPREDNISolone (MEDROL) 4 MG Oral Tablet Therapy Pack 1 kit 0     Sig: As directed.        Imaging & Consults:  N

## 2019-03-28 ENCOUNTER — OFFICE VISIT (OUTPATIENT)
Dept: FAMILY MEDICINE CLINIC | Facility: CLINIC | Age: 69
End: 2019-03-28
Payer: MEDICARE

## 2019-03-28 VITALS
SYSTOLIC BLOOD PRESSURE: 132 MMHG | WEIGHT: 173 LBS | TEMPERATURE: 97 F | DIASTOLIC BLOOD PRESSURE: 86 MMHG | HEART RATE: 78 BPM | BODY MASS INDEX: 25.62 KG/M2 | RESPIRATION RATE: 18 BRPM | HEIGHT: 69 IN

## 2019-03-28 DIAGNOSIS — R05.3 PERSISTENT COUGH: ICD-10-CM

## 2019-03-28 DIAGNOSIS — J01.20 SUBACUTE ETHMOIDAL SINUSITIS: Primary | ICD-10-CM

## 2019-03-28 DIAGNOSIS — J02.9 PHARYNGITIS, UNSPECIFIED ETIOLOGY: ICD-10-CM

## 2019-03-28 PROCEDURE — 99214 OFFICE O/P EST MOD 30 MIN: CPT | Performed by: FAMILY MEDICINE

## 2019-03-28 RX ORDER — AMOXICILLIN AND CLAVULANATE POTASSIUM 875; 125 MG/1; MG/1
1 TABLET, FILM COATED ORAL 2 TIMES DAILY
Qty: 10 TABLET | Refills: 0 | Status: SHIPPED | OUTPATIENT
Start: 2019-03-28 | End: 2019-04-02

## 2019-03-28 NOTE — PROGRESS NOTES
HPI:   Amilcar Davies is a 77year old male who presents for upper respiratory symptoms for  3  weeks. Patient reported .  He was prescribed Augmentin and wass feeling better, but still hads a cough and production was placed on another course of ABX and me [place]   • TONSILLECTOMY     • UPPER GI ENDO POSS MICHELINE  07/2017    [facility]; HH- repeat 3 yr (no dysplasia)      Family History   Problem Relation Age of Onset   • Cancer Father         pancreatic   • Diabetes Father    • Cancer Mother         lupe Clavulanate 875-125 MG Oral Tab 10 tablet 0     Sig: Take 1 tablet by mouth 2 (two) times daily for 5 days.        Imaging & Consults:  None

## 2019-04-01 ENCOUNTER — TELEPHONE (OUTPATIENT)
Dept: FAMILY MEDICINE CLINIC | Facility: CLINIC | Age: 69
End: 2019-04-01

## 2019-04-01 NOTE — TELEPHONE ENCOUNTER
With hx of colon polyps colonoscopy is absolutely recommeneded, is the only way to directly visualize to see if polyps and remove them at time of procedure if needed

## 2019-04-01 NOTE — TELEPHONE ENCOUNTER
Patient called and wants to speak with a nurse regarding the letter that he received from Dr Yaima Heller stating that he was due for his EGD & Colonoscopy.  Patient wants to know if Dr Jabari Joseph wants him to do the Colonoscopy or is their some other testing that he

## 2019-04-22 ENCOUNTER — MED REC SCAN ONLY (OUTPATIENT)
Dept: FAMILY MEDICINE CLINIC | Facility: CLINIC | Age: 69
End: 2019-04-22

## 2019-06-20 DIAGNOSIS — I25.10 ATHEROSCLEROSIS OF NATIVE CORONARY ARTERY OF NATIVE HEART, ANGINA PRESENCE UNSPECIFIED: ICD-10-CM

## 2019-06-20 DIAGNOSIS — Z86.010 PERSONAL HISTORY OF COLONIC POLYPS: ICD-10-CM

## 2019-06-20 DIAGNOSIS — K22.70 BARRETT'S ESOPHAGUS WITHOUT DYSPLASIA: ICD-10-CM

## 2019-06-20 DIAGNOSIS — K21.00 GASTROESOPHAGEAL REFLUX DISEASE WITH ESOPHAGITIS: ICD-10-CM

## 2019-06-20 DIAGNOSIS — R93.1 ABNORMAL HEART SCORE CT: ICD-10-CM

## 2019-06-20 DIAGNOSIS — E78.00 PURE HYPERCHOLESTEROLEMIA: ICD-10-CM

## 2019-06-20 DIAGNOSIS — R42 EPISODIC LIGHTHEADEDNESS: ICD-10-CM

## 2019-06-20 DIAGNOSIS — I10 ESSENTIAL HYPERTENSION: ICD-10-CM

## 2019-06-20 DIAGNOSIS — I45.10 RBBB: ICD-10-CM

## 2019-06-20 NOTE — TELEPHONE ENCOUNTER
PT CALLED AND ADV NEEDS 90 DAY REFILLS OF     lisinopril 10 MG Oral Tab  AND   omeprazole 20 MG Oral Capsule Delayed Release    PLEASE SEND TO [facility]     THANK YOU  PT AWARE DR OUT OF OFFICE UNTIL TOMORROW- PT V/U

## 2019-06-20 NOTE — TELEPHONE ENCOUNTER
Last OV 3/28/19  Last labs 8/15/18  Last refilled 10/14/18 to mail order.    Requesting scripts to mail order

## 2019-06-21 RX ORDER — OMEPRAZOLE 20 MG/1
CAPSULE, DELAYED RELEASE ORAL
Qty: 90 CAPSULE | Refills: 3 | Status: SHIPPED | OUTPATIENT
Start: 2019-06-21 | End: 2020-08-10

## 2019-06-21 RX ORDER — LISINOPRIL 10 MG/1
10 TABLET ORAL DAILY
Qty: 90 TABLET | Refills: 3 | Status: SHIPPED | OUTPATIENT
Start: 2019-06-21 | End: 2020-07-13

## 2019-08-17 ENCOUNTER — TELEPHONE (OUTPATIENT)
Dept: FAMILY MEDICINE CLINIC | Facility: CLINIC | Age: 69
End: 2019-08-17

## 2019-08-17 NOTE — TELEPHONE ENCOUNTER
Pt called, possible allergies? Symptoms-stuff runs down his throat-coughs. Every spring and fall. Is there something we can call in for pt or does he have to be seen?    Please call pt at [phone]

## 2019-08-17 NOTE — TELEPHONE ENCOUNTER
Per Lizeth Shape give about 7-10 days or if worsening symptoms to let us know. Patient advised, verbalized understanding.  Patient has been having symptoms for 4 days

## 2019-08-17 NOTE — TELEPHONE ENCOUNTER
Call from patient. States he has head congestions, runny nose, cough. States he can't take decongestant d/t head issue. No fever, body aches or chills. States he took  promethazine with codeine last night-helped a little.   Per [facility] try OTC flonase and OTC a

## 2019-09-14 NOTE — TELEPHONE ENCOUNTER
tamsulosin Last refilled on 10/14/18 for # 180 with 3 refills  Fluticasone 10/14/18 #3 bottle 3 refills  Last OV 1/22/19  Future Appointments   Date Time Provider Irene Mcnamara   9/23/2019  9:10 AM Chula Askew MD [facility]   11/1/2019  3

## 2019-09-16 RX ORDER — FLUTICASONE PROPIONATE 50 MCG
SPRAY, SUSPENSION (ML) NASAL
Qty: 3 BOTTLE | Refills: 3 | Status: SHIPPED | OUTPATIENT
Start: 2019-09-16 | End: 2021-05-04

## 2019-09-16 RX ORDER — TAMSULOSIN HYDROCHLORIDE 0.4 MG/1
CAPSULE ORAL
Qty: 180 CAPSULE | Refills: 3 | Status: SHIPPED | OUTPATIENT
Start: 2019-09-16 | End: 2021-02-16

## 2019-10-14 RX ORDER — ATORVASTATIN CALCIUM 20 MG/1
TABLET, FILM COATED ORAL
Qty: 90 TABLET | Refills: 0 | Status: SHIPPED | OUTPATIENT
Start: 2019-10-14 | End: 2019-12-31

## 2019-10-29 ENCOUNTER — MED REC SCAN ONLY (OUTPATIENT)
Dept: FAMILY MEDICINE CLINIC | Facility: CLINIC | Age: 69
End: 2019-10-29

## 2019-10-29 ENCOUNTER — IMMUNIZATION (OUTPATIENT)
Dept: FAMILY MEDICINE CLINIC | Facility: CLINIC | Age: 69
End: 2019-10-29
Payer: MEDICARE

## 2019-10-29 DIAGNOSIS — Z23 NEED FOR VACCINATION: ICD-10-CM

## 2019-10-29 PROCEDURE — G0008 ADMIN INFLUENZA VIRUS VAC: HCPCS | Performed by: FAMILY MEDICINE

## 2019-10-29 PROCEDURE — 90662 IIV NO PRSV INCREASED AG IM: CPT | Performed by: FAMILY MEDICINE

## 2019-12-02 ENCOUNTER — TELEPHONE (OUTPATIENT)
Dept: FAMILY MEDICINE CLINIC | Facility: CLINIC | Age: 69
End: 2019-12-02

## 2019-12-02 NOTE — TELEPHONE ENCOUNTER
Pt spoke with the [address] [facility] and he was told that if he had any of the 3, Plaque, Ischemic, or Heart Diseases.  If the doctor stats any of these are from his time in the service because he was exposed to [employer], The doctor would have to say that more then [facility]

## 2019-12-02 NOTE — TELEPHONE ENCOUNTER
I have no idea if from [facility], I've also never heard of such a request from the [place] (my understnading was they do this evaluation themselves and decide what is likely from agent orange). Can you get more info from [place] on this?     Dr. Rupinder Dill (

## 2019-12-03 NOTE — TELEPHONE ENCOUNTER
Message   Received:  Today   Message Contents   Denae Hernandez Nurse   Caller: Unspecified (Today, 10:56 AM)             Patient is returning your call please call back at [phone] with the pt and advised that the cardiolog

## 2019-12-03 NOTE — TELEPHONE ENCOUNTER
Call the [employer] help line at [phone]    The pt has to meet criteria to apply for the disability benefits and the VA will be the one to decide if it is from [place] or not.  He can see Cardiology and if he is diagnosed with ischem

## 2019-12-31 ENCOUNTER — TELEPHONE (OUTPATIENT)
Dept: FAMILY MEDICINE CLINIC | Facility: CLINIC | Age: 69
End: 2019-12-31

## 2019-12-31 DIAGNOSIS — E78.00 PURE HYPERCHOLESTEROLEMIA: Primary | ICD-10-CM

## 2019-12-31 RX ORDER — ATORVASTATIN CALCIUM 20 MG/1
TABLET, FILM COATED ORAL
Qty: 90 TABLET | Refills: 0 | Status: SHIPPED | OUTPATIENT
Start: 2019-12-31 | End: 2020-04-16

## 2019-12-31 NOTE — TELEPHONE ENCOUNTER
Called the wife to clarify what medication she is requesting  She states that the pharmacy has contacted her and that they have 3 of his meds ready but one needed MD approval-   It looks like there is a request for Atorvastatin from the pharmacy      See t

## 2019-12-31 NOTE — TELEPHONE ENCOUNTER
Spouse called,said pharmacy just called her. She is wanting all pt's meds refilled today. That way her ins will cover. They have met their out of pocket.

## 2020-01-07 ENCOUNTER — TELEPHONE (OUTPATIENT)
Dept: FAMILY MEDICINE CLINIC | Facility: CLINIC | Age: 70
End: 2020-01-07

## 2020-01-07 NOTE — TELEPHONE ENCOUNTER
PT CALLED AND ADV THAT YESTERDAY HAD A SPELL OF SOME SHAKENESS. TODAY STILL NOT FEELING WELL NOR UP TO PAR.  WOULD LIKE TO BE SEEN SOONER THAN LATER    PLEASE CALL AND ADV

## 2020-01-07 NOTE — TELEPHONE ENCOUNTER
Per [facility] 1000 Thursday  If symptoms worsen go to        Left message for the pt  to call back

## 2020-01-07 NOTE — TELEPHONE ENCOUNTER
Nelsy Hearn Nurse   Caller: Unspecified (Today, 12:22 PM)             Please call back, returning phone call     Called the pt and offered the appt on Thur- he is agreeable  Future Appointments   Date Time Provider Irene Felix

## 2020-01-07 NOTE — TELEPHONE ENCOUNTER
Spoke with the pt and he was in the barn feeding  And he got shaky and weak- headache    Happened yesterday evening- he did eat lunch ( sandwich and pop)     Family hx of DM    Lasted about an hour- took ibuprofen and helped the headache    He was pushing

## 2020-01-09 ENCOUNTER — OFFICE VISIT (OUTPATIENT)
Dept: FAMILY MEDICINE CLINIC | Facility: CLINIC | Age: 70
End: 2020-01-09
Payer: MEDICARE

## 2020-01-09 VITALS
WEIGHT: 174.81 LBS | OXYGEN SATURATION: 99 % | RESPIRATION RATE: 14 BRPM | HEART RATE: 90 BPM | TEMPERATURE: 99 F | BODY MASS INDEX: 24 KG/M2 | SYSTOLIC BLOOD PRESSURE: 138 MMHG | DIASTOLIC BLOOD PRESSURE: 78 MMHG

## 2020-01-09 DIAGNOSIS — R03.0 ELEVATED BLOOD-PRESSURE READING, WITHOUT DIAGNOSIS OF HYPERTENSION: ICD-10-CM

## 2020-01-09 DIAGNOSIS — R09.89 LABILE BLOOD PRESSURE: ICD-10-CM

## 2020-01-09 DIAGNOSIS — R25.1 SHAKY: ICD-10-CM

## 2020-01-09 DIAGNOSIS — R68.81 EARLY SATIETY: ICD-10-CM

## 2020-01-09 DIAGNOSIS — R53.1 EPISODIC WEAKNESS: Primary | ICD-10-CM

## 2020-01-09 DIAGNOSIS — R79.9 ABNORMAL FINDING OF BLOOD CHEMISTRY, UNSPECIFIED: ICD-10-CM

## 2020-01-09 DIAGNOSIS — R55 NEAR SYNCOPE: ICD-10-CM

## 2020-01-09 LAB
ALBUMIN SERPL-MCNC: 4.1 G/DL (ref 3.4–5)
ALBUMIN/GLOB SERPL: 1.2 {RATIO} (ref 1–2)
ALP LIVER SERPL-CCNC: 89 U/L (ref 45–117)
ALT SERPL-CCNC: 24 U/L (ref 16–61)
AMYLASE SERPL-CCNC: 65 U/L (ref 25–115)
ANION GAP SERPL CALC-SCNC: 3 MMOL/L (ref 0–18)
AST SERPL-CCNC: 14 U/L (ref 15–37)
BASOPHILS # BLD AUTO: 0.03 X10(3) UL (ref 0–0.2)
BASOPHILS NFR BLD AUTO: 0.4 %
BILIRUB SERPL-MCNC: 0.7 MG/DL (ref 0.1–2)
BUN BLD-MCNC: 15 MG/DL (ref 7–18)
BUN/CREAT SERPL: 13.3 (ref 10–20)
CALCIUM BLD-MCNC: 9.5 MG/DL (ref 8.5–10.1)
CHLORIDE SERPL-SCNC: 109 MMOL/L (ref 98–112)
CO2 SERPL-SCNC: 29 MMOL/L (ref 21–32)
CREAT BLD-MCNC: 1.13 MG/DL (ref 0.7–1.3)
DEPRECATED HBV CORE AB SER IA-ACNC: 204.1 NG/ML (ref 30–530)
DEPRECATED RDW RBC AUTO: 42.3 FL (ref 35.1–46.3)
EOSINOPHIL # BLD AUTO: 0.12 X10(3) UL (ref 0–0.7)
EOSINOPHIL NFR BLD AUTO: 1.5 %
ERYTHROCYTE [DISTWIDTH] IN BLOOD BY AUTOMATED COUNT: 12.4 % (ref 11–15)
EST. AVERAGE GLUCOSE BLD GHB EST-MCNC: 111 MG/DL (ref 68–126)
GLOBULIN PLAS-MCNC: 3.3 G/DL (ref 2.8–4.4)
GLUCOSE BLD-MCNC: 109 MG/DL (ref 70–99)
HAV IGM SER QL: 1.8 MG/DL (ref 1.6–2.6)
HBA1C MFR BLD HPLC: 5.5 % (ref ?–5.7)
HCT VFR BLD AUTO: 43.9 % (ref 39–53)
HGB BLD-MCNC: 15 G/DL (ref 13–17.5)
IMM GRANULOCYTES # BLD AUTO: 0.02 X10(3) UL (ref 0–1)
IMM GRANULOCYTES NFR BLD: 0.2 %
LIPASE SERPL-CCNC: 83 U/L (ref 73–393)
LYMPHOCYTES # BLD AUTO: 1.46 X10(3) UL (ref 1–4)
LYMPHOCYTES NFR BLD AUTO: 17.8 %
M PROTEIN MFR SERPL ELPH: 7.4 G/DL (ref 6.4–8.2)
MCH RBC QN AUTO: 31.6 PG (ref 26–34)
MCHC RBC AUTO-ENTMCNC: 34.2 G/DL (ref 31–37)
MCV RBC AUTO: 92.6 FL (ref 80–100)
MONOCYTES # BLD AUTO: 0.44 X10(3) UL (ref 0.1–1)
MONOCYTES NFR BLD AUTO: 5.4 %
NEUTROPHILS # BLD AUTO: 6.13 X10 (3) UL (ref 1.5–7.7)
NEUTROPHILS # BLD AUTO: 6.13 X10(3) UL (ref 1.5–7.7)
NEUTROPHILS NFR BLD AUTO: 74.7 %
OSMOLALITY SERPL CALC.SUM OF ELEC: 293 MOSM/KG (ref 275–295)
PATIENT FASTING Y/N/NP: NO
PLATELET # BLD AUTO: 199 10(3)UL (ref 150–450)
POTASSIUM SERPL-SCNC: 4.3 MMOL/L (ref 3.5–5.1)
RBC # BLD AUTO: 4.74 X10(6)UL (ref 3.8–5.8)
SODIUM SERPL-SCNC: 141 MMOL/L (ref 136–145)
TSI SER-ACNC: 0.87 MIU/ML (ref 0.36–3.74)
VIT B12 SERPL-MCNC: 403 PG/ML (ref 193–986)
WBC # BLD AUTO: 8.2 X10(3) UL (ref 4–11)

## 2020-01-09 PROCEDURE — 82728 ASSAY OF FERRITIN: CPT | Performed by: FAMILY MEDICINE

## 2020-01-09 PROCEDURE — 80053 COMPREHEN METABOLIC PANEL: CPT | Performed by: FAMILY MEDICINE

## 2020-01-09 PROCEDURE — 83690 ASSAY OF LIPASE: CPT | Performed by: FAMILY MEDICINE

## 2020-01-09 PROCEDURE — 83735 ASSAY OF MAGNESIUM: CPT | Performed by: FAMILY MEDICINE

## 2020-01-09 PROCEDURE — 99214 OFFICE O/P EST MOD 30 MIN: CPT | Performed by: FAMILY MEDICINE

## 2020-01-09 PROCEDURE — 82607 VITAMIN B-12: CPT | Performed by: FAMILY MEDICINE

## 2020-01-09 PROCEDURE — 83036 HEMOGLOBIN GLYCOSYLATED A1C: CPT | Performed by: FAMILY MEDICINE

## 2020-01-09 PROCEDURE — 85025 COMPLETE CBC W/AUTO DIFF WBC: CPT | Performed by: FAMILY MEDICINE

## 2020-01-09 PROCEDURE — 84443 ASSAY THYROID STIM HORMONE: CPT | Performed by: FAMILY MEDICINE

## 2020-01-09 PROCEDURE — 82150 ASSAY OF AMYLASE: CPT | Performed by: FAMILY MEDICINE

## 2020-01-09 NOTE — PROGRESS NOTES
Jerome Rodriguez is a 68year old male. HPI:   Patient reports overall feeling well, but now and then gets episodes of feeling weak and shaky.     He was out in the barn doing chores last week and felt weak and shaky, then a headache, came inside and sat do capsule (50,000 Units total) by mouth once a week. (Patient taking differently: Take 2,000 Units by mouth daily.  ) 4 capsule 2   • omega-3 fatty acids 1000 MG Oral Cap Take 1,000 mg by mouth daily.      • aspirin 81 MG Oral Chew Tab Chew 81 mg by mouth candida and throat is clear without lesions nor inflammation  NECK: supple,no adenopathy,no JVD, no thyromegaly  LUNGS: clear to auscultation  CARDIO: RRR without murmur  GI: good BS's,no masses, HSM or tenderness  NEURO: CN II-XII intact, no focal neurologic defe DIFFERENTIAL    Abnormal finding of blood chemistry, unspecified   -     FERRITIN; Future  -     HEMOGLOBIN A1C; Future    Unclear etiology; start with labs, if unrevealing will do 24 hour BP monitor and have him keep symptom journal during it; if that nor

## 2020-01-11 ENCOUNTER — TELEPHONE (OUTPATIENT)
Dept: FAMILY MEDICINE CLINIC | Facility: CLINIC | Age: 70
End: 2020-01-11

## 2020-01-11 NOTE — TELEPHONE ENCOUNTER
----- Message from Nessa Parker MD sent at 1/11/2020  9:08 AM CST -----  Please notify patient his lab work looks absoutely beautiful, including normal blood sugar, kidnyes, liver, electrolytes, thyroid, iron, vitamin B12, pancreatic enzymes and blood cou

## 2020-01-26 ENCOUNTER — HOSPITAL ENCOUNTER (EMERGENCY)
Age: 70
Discharge: HOME | End: 2020-01-26
Attending: EMERGENCY MEDICINE
Payer: MEDICARE

## 2020-01-26 ENCOUNTER — APPOINTMENT (OUTPATIENT)
Dept: GENERAL RADIOLOGY | Age: 70
End: 2020-01-26
Attending: EMERGENCY MEDICINE
Payer: MEDICARE

## 2020-01-26 VITALS
OXYGEN SATURATION: 98 % | WEIGHT: 168 LBS | RESPIRATION RATE: 18 BRPM | HEART RATE: 87 BPM | DIASTOLIC BLOOD PRESSURE: 66 MMHG | HEIGHT: 71 IN | SYSTOLIC BLOOD PRESSURE: 159 MMHG | BODY MASS INDEX: 23.52 KG/M2 | TEMPERATURE: 100 F

## 2020-01-26 DIAGNOSIS — J11.1 INFLUENZA: Primary | ICD-10-CM

## 2020-01-26 LAB
POCT INFLUENZA A: POSITIVE
POCT INFLUENZA B: NEGATIVE

## 2020-01-26 PROCEDURE — 99283 EMERGENCY DEPT VISIT LOW MDM: CPT

## 2020-01-26 PROCEDURE — 99284 EMERGENCY DEPT VISIT MOD MDM: CPT

## 2020-01-26 PROCEDURE — 87502 INFLUENZA DNA AMP PROBE: CPT | Performed by: EMERGENCY MEDICINE

## 2020-01-26 PROCEDURE — 71046 X-RAY EXAM CHEST 2 VIEWS: CPT | Performed by: EMERGENCY MEDICINE

## 2020-01-26 PROCEDURE — 87502 INFLUENZA DNA AMP PROBE: CPT

## 2020-01-26 RX ORDER — OSELTAMIVIR PHOSPHATE 75 MG/1
75 CAPSULE ORAL 2 TIMES DAILY
Qty: 10 CAPSULE | Refills: 0 | Status: SHIPPED | OUTPATIENT
Start: 2020-01-26 | End: 2020-01-31

## 2020-01-26 NOTE — ED INITIAL ASSESSMENT (HPI)
Pt c/o productive cough with \"greenish\" sputum x 1 week. Pt denies fever. Pt states, \"I get this and it turns into bronchitis all of time. My wife heard me wheezing at home\".

## 2020-01-26 NOTE — ED PROVIDER NOTES
Patient Seen in: [facility] [place]      History   Patient presents with:  Cough/URI    Stated Complaint: Cough x 1 week    HPI    66-year-old male presents emergency room for evaluation of cough.   Patient states cough is present f (Temporal)   Resp 18   Ht 180.3 cm (5' 11\")   Wt 76.2 kg   SpO2 98%   BMI 23.43 kg/m²         Physical Exam    GENERAL: Patient is awake, alert, well-appearing, in no acute distress. HEENT:no scleral icterus.   Mucous membranes are moist, oropharynx is cl capsule Refills: 0

## 2020-01-28 ENCOUNTER — OFFICE VISIT (OUTPATIENT)
Dept: FAMILY MEDICINE CLINIC | Facility: CLINIC | Age: 70
End: 2020-01-28
Payer: MEDICARE

## 2020-01-28 VITALS
DIASTOLIC BLOOD PRESSURE: 70 MMHG | BODY MASS INDEX: 24 KG/M2 | HEART RATE: 80 BPM | WEIGHT: 175.38 LBS | TEMPERATURE: 98 F | SYSTOLIC BLOOD PRESSURE: 130 MMHG | RESPIRATION RATE: 14 BRPM

## 2020-01-28 DIAGNOSIS — Z09 FOLLOW-UP EXAM: Primary | ICD-10-CM

## 2020-01-28 DIAGNOSIS — J10.1 INFLUENZA A: ICD-10-CM

## 2020-01-28 DIAGNOSIS — J01.00 ACUTE NON-RECURRENT MAXILLARY SINUSITIS: ICD-10-CM

## 2020-01-28 PROCEDURE — 99214 OFFICE O/P EST MOD 30 MIN: CPT | Performed by: FAMILY MEDICINE

## 2020-01-28 RX ORDER — CEFDINIR 300 MG/1
300 CAPSULE ORAL 2 TIMES DAILY
Qty: 14 CAPSULE | Refills: 0 | Status: SHIPPED | OUTPATIENT
Start: 2020-01-28 | End: 2020-02-04

## 2020-01-28 NOTE — PROGRESS NOTES
Lisandro Degree is a 68year old male. HPI:   Pt is here for ER/UC/hospital f/u.     ER/UC or hospital: [facility]    Date(s): 1/26/2020    Reason for initial ER visit: \"Cough/URI     Stated Complaint: Cough x 1 week     HPI     68-year-old male influenza A.   Patient symptoms have been present in the last 48 hours and will be given prescription for Tamiflu, discussed abortive care including alternating ibuprofen and Tylenol, push fluids, return to ER if any change worsening symptoms and follow-up Take 1 capsule (50,000 Units total) by mouth once a week. (Patient taking differently: Take 2,000 Units by mouth daily.  ) 4 capsule 2   • omega-3 fatty acids 1000 MG Oral Cap Take 1,000 mg by mouth daily.      • aspirin 81 MG Oral Chew Tab Chew 81 mg by mo atraumatic, normocephalic,ear canals clear, normal TMs; and throat is clear without lesions nor inflammation l+ thick PND; nares bi red and swollen R>>L with thick purulent discharge in superior nare  NECK: supple,no adenopathy, no thyromegaly  LUNGS: sandro

## 2020-02-05 ENCOUNTER — OFFICE VISIT (OUTPATIENT)
Dept: FAMILY MEDICINE CLINIC | Facility: CLINIC | Age: 70
End: 2020-02-05
Payer: MEDICARE

## 2020-02-05 VITALS
DIASTOLIC BLOOD PRESSURE: 80 MMHG | TEMPERATURE: 97 F | RESPIRATION RATE: 16 BRPM | BODY MASS INDEX: 24 KG/M2 | SYSTOLIC BLOOD PRESSURE: 130 MMHG | HEART RATE: 80 BPM | WEIGHT: 172.81 LBS

## 2020-02-05 DIAGNOSIS — I25.10 ATHEROSCLEROSIS OF NATIVE CORONARY ARTERY OF NATIVE HEART, ANGINA PRESENCE UNSPECIFIED: ICD-10-CM

## 2020-02-05 DIAGNOSIS — R93.1 ABNORMAL HEART SCORE CT: ICD-10-CM

## 2020-02-05 DIAGNOSIS — I10 ESSENTIAL HYPERTENSION: ICD-10-CM

## 2020-02-05 DIAGNOSIS — K21.00 GASTROESOPHAGEAL REFLUX DISEASE WITH ESOPHAGITIS: ICD-10-CM

## 2020-02-05 DIAGNOSIS — N40.1 BENIGN PROSTATIC HYPERPLASIA WITH NOCTURIA: ICD-10-CM

## 2020-02-05 DIAGNOSIS — K22.70 BARRETT'S ESOPHAGUS WITHOUT DYSPLASIA: ICD-10-CM

## 2020-02-05 DIAGNOSIS — J84.9 INTERSTITIAL LUNG DISEASE (HCC): ICD-10-CM

## 2020-02-05 DIAGNOSIS — I45.10 RBBB: ICD-10-CM

## 2020-02-05 DIAGNOSIS — Z86.010 PERSONAL HISTORY OF COLONIC POLYPS: ICD-10-CM

## 2020-02-05 DIAGNOSIS — R35.1 BENIGN PROSTATIC HYPERPLASIA WITH NOCTURIA: ICD-10-CM

## 2020-02-05 DIAGNOSIS — Z00.00 ENCOUNTER FOR ANNUAL HEALTH EXAMINATION: Primary | ICD-10-CM

## 2020-02-05 DIAGNOSIS — I20.8 ANGINAL EQUIVALENT (HCC): ICD-10-CM

## 2020-02-05 DIAGNOSIS — Z92.89 H/O ECHOCARDIOGRAM: ICD-10-CM

## 2020-02-05 DIAGNOSIS — L57.0 ACTINIC KERATOSIS: ICD-10-CM

## 2020-02-05 DIAGNOSIS — N52.1 ERECTILE DYSFUNCTION DUE TO DISEASES CLASSIFIED ELSEWHERE: ICD-10-CM

## 2020-02-05 DIAGNOSIS — E78.00 PURE HYPERCHOLESTEROLEMIA: ICD-10-CM

## 2020-02-05 PROCEDURE — 17110 DESTRUCTION B9 LES UP TO 14: CPT | Performed by: FAMILY MEDICINE

## 2020-02-05 PROCEDURE — G0439 PPPS, SUBSEQ VISIT: HCPCS | Performed by: FAMILY MEDICINE

## 2020-02-05 RX ORDER — TADALAFIL 2.5 MG/1
2.5 TABLET ORAL DAILY
Qty: 30 TABLET | Refills: 11 | Status: SHIPPED | OUTPATIENT
Start: 2020-02-05 | End: 2020-03-02 | Stop reason: ALTCHOICE

## 2020-02-05 NOTE — PROGRESS NOTES
HPI:   Marletta Goltz is a 68year old male who presents for a Medicare Subsequent Annual Wellness visit (Pt already had Initial Annual Wellness).     Patient feelign better from the flu and sinuses mildly stuffy but almost back to normal, feeling much b Alcohol screening   Mateo Britt was screened for Alcohol abuse and had a score of 0 so is at low risk.      Patient Care Team: Patient Care Team:  Ricarda Goodwin MD as PCP - [facility])  Luis A Coburn DO as PCP - [facility]  Lisa Gaviria, EVERY MORNING  lisinopril 10 MG Oral Tab, Take 1 tablet (10 mg total) by mouth daily. ergocalciferol 06685 units Oral Cap, Take 1 capsule (50,000 Units total) by mouth once a week.  (Patient taking differently: Take 2,000 Units by mouth daily.  )  omega-3 AM  Screening Method:  Whisper Test  Whisper Test Result:  Pass                    Visual Acuity                           General Appearance:  Alert, cooperative, no distress, appears stated age   Head:  Normocephalic, without obvious abnormality, atrauma • Zoster Vaccine Live (Zostavax) 12/02/2009        ASSESSMENT AND OTHER RELEVANT CHRONIC CONDITIONS:   Eliana Miller is a 68year old male who presents for a Medicare Assessment.      PLAN SUMMARY:   Diagnoses and all orders for this visit:    Encounter exercise. No follow-ups on file.      Jeanne Kim MD, 2/5/2020     General Health     In the past six months, have you lost more than 10 pounds without trying?: 2 - No  Has your appetite been poor?: No  How does the patient maintain a good energy level Pneumococcal 23 (Pneumovax)  Covered Once after 65 10/23/2013 Please get once after your 65th birthday    Hepatitis B for Moderate/High Risk No vaccine history found Medium/high risk factors:   End-stage renal disease   Hemophiliacs who received Factor

## 2020-03-02 ENCOUNTER — TELEPHONE (OUTPATIENT)
Dept: FAMILY MEDICINE CLINIC | Facility: CLINIC | Age: 70
End: 2020-03-02

## 2020-03-02 RX ORDER — TADALAFIL 5 MG/1
5 TABLET ORAL DAILY
Qty: 30 TABLET | Refills: 1 | Status: SHIPPED | OUTPATIENT
Start: 2020-03-02 | End: 2020-03-26

## 2020-03-02 NOTE — TELEPHONE ENCOUNTER
Spoke with the pt and advised of the new dose and to call in a month with an update  He does need the next script to go to mail order

## 2020-03-02 NOTE — TELEPHONE ENCOUNTER
Spoke with the pt and he states that sometimes he is still having some issues with starting his stream when urinating  He is getting up less at night to urinate  Wonders about increasing the dose of the tadalafil

## 2020-03-26 ENCOUNTER — TELEPHONE (OUTPATIENT)
Dept: FAMILY MEDICINE CLINIC | Facility: CLINIC | Age: 70
End: 2020-03-26

## 2020-03-26 RX ORDER — TADALAFIL 5 MG/1
5 TABLET ORAL DAILY
Qty: 90 TABLET | Refills: 1 | Status: SHIPPED | OUTPATIENT
Start: 2020-03-26 | End: 2020-08-10

## 2020-03-26 NOTE — TELEPHONE ENCOUNTER
Last OV 2/5/2020  Dose increased 3/2/2020    Patient states current dose of cialis 5 mg is working well for him. Does not require dose increase at this time. States ok to sent to [facility] on [address]   Last script sent for #30 tabs  Would like 90 day

## 2020-03-26 NOTE — TELEPHONE ENCOUNTER
tadalafil (CIALIS) 5 MG Oral Tab  The higher dosages worked well for pt.  Asking for 90 days   Pal Kessler, [facility] [phone], [phone]

## 2020-04-16 DIAGNOSIS — E78.00 PURE HYPERCHOLESTEROLEMIA: ICD-10-CM

## 2020-04-16 RX ORDER — ATORVASTATIN CALCIUM 20 MG/1
TABLET, FILM COATED ORAL
Qty: 90 TABLET | Refills: 0 | Status: SHIPPED | OUTPATIENT
Start: 2020-04-16 | End: 2020-07-16

## 2020-04-16 NOTE — TELEPHONE ENCOUNTER
Cholesterol Medication Protocol Passed4/16 2:17 PM   ALT < 80    ALT resulted within past year    Lipid panel within past 12 months    Appointment within past 12 or next 3 months     Last refill 12/31/19 #90 0 refill  Last CMP 1/9/20  Last OV 2/5/20  Refil

## 2020-07-13 DIAGNOSIS — I25.10 ATHEROSCLEROSIS OF NATIVE CORONARY ARTERY OF NATIVE HEART, ANGINA PRESENCE UNSPECIFIED: ICD-10-CM

## 2020-07-13 DIAGNOSIS — R93.1 ABNORMAL HEART SCORE CT: ICD-10-CM

## 2020-07-13 DIAGNOSIS — E78.00 PURE HYPERCHOLESTEROLEMIA: ICD-10-CM

## 2020-07-13 DIAGNOSIS — I45.10 RBBB: ICD-10-CM

## 2020-07-13 DIAGNOSIS — I10 ESSENTIAL HYPERTENSION: ICD-10-CM

## 2020-07-13 DIAGNOSIS — R42 EPISODIC LIGHTHEADEDNESS: ICD-10-CM

## 2020-07-13 RX ORDER — LISINOPRIL 10 MG/1
TABLET ORAL
Qty: 90 TABLET | Refills: 0 | Status: SHIPPED | OUTPATIENT
Start: 2020-07-13 | End: 2020-10-07

## 2020-07-13 NOTE — TELEPHONE ENCOUNTER
Hypertension Medications Protocol Passed7/13 12:29 PM   CMP or BMP in past 12 months    Last serum creatinine< 2.0    Appointment in past 6 or next 3 months     Last refill - 6/21/19   Passes protocol  Refilled per protocol

## 2020-07-16 DIAGNOSIS — E78.00 PURE HYPERCHOLESTEROLEMIA: ICD-10-CM

## 2020-07-16 RX ORDER — ATORVASTATIN CALCIUM 20 MG/1
TABLET, FILM COATED ORAL
Qty: 90 TABLET | Refills: 0 | Status: SHIPPED | OUTPATIENT
Start: 2020-07-16 | End: 2020-10-12

## 2020-07-16 NOTE — TELEPHONE ENCOUNTER
Cholesterol Medication Protocol Passed7/16 2:58 PM   ALT < 80    ALT resulted within past year    Lipid panel within past 12 months    Appointment within past 12 or next 3 months     Last OV on 2 5 2020  Refilled per protocol

## 2020-07-25 ENCOUNTER — TELEPHONE (OUTPATIENT)
Dept: FAMILY MEDICINE CLINIC | Facility: CLINIC | Age: 70
End: 2020-07-25

## 2020-07-25 NOTE — TELEPHONE ENCOUNTER
Pt called, has a stuffy nose. Pt states he has had this before and Dr. Lua Dears knows about it, has prescribed stuff for him before. [facility].    Please call pt at [phone]

## 2020-07-25 NOTE — TELEPHONE ENCOUNTER
He really should not be taking Sudafed in any form it can shut down his prostate not to mention have an effect on his BP, I see he has fluticasone, (flonase) in his med list, but is he using it? If not 2 puffs in each side at night.  Also allegra or clariti

## 2020-07-25 NOTE — TELEPHONE ENCOUNTER
Pt denies fever. Pt has congestion, headache, hoarse voice, slight sore throat. No cough, loss of taste or smell, although can not smell due to congestion. Did take Sudafed PE but is not helping.  He states per Dr. Maria D Lauren can not take a lot of OTC med

## 2020-08-06 DIAGNOSIS — Z86.010 PERSONAL HISTORY OF COLONIC POLYPS: ICD-10-CM

## 2020-08-06 DIAGNOSIS — K21.00 GASTROESOPHAGEAL REFLUX DISEASE WITH ESOPHAGITIS: ICD-10-CM

## 2020-08-06 DIAGNOSIS — K22.70 BARRETT'S ESOPHAGUS WITHOUT DYSPLASIA: ICD-10-CM

## 2020-08-06 NOTE — TELEPHONE ENCOUNTER
Last refill on Tadalfil #90 with 1 refill on 7 13 2020  Last refill on Omeprazole #90 with 3 refills on 6 21 2019   Last OV on 2 5 2020

## 2020-08-10 RX ORDER — OMEPRAZOLE 20 MG/1
CAPSULE, DELAYED RELEASE ORAL
Qty: 90 CAPSULE | Refills: 3 | Status: SHIPPED | OUTPATIENT
Start: 2020-08-10 | End: 2021-08-04

## 2020-08-10 RX ORDER — TADALAFIL 5 MG/1
TABLET ORAL
Qty: 90 TABLET | Refills: 1 | Status: SHIPPED | OUTPATIENT
Start: 2020-08-10 | End: 2021-03-18

## 2020-10-07 DIAGNOSIS — I10 ESSENTIAL HYPERTENSION: ICD-10-CM

## 2020-10-07 DIAGNOSIS — R93.1 ABNORMAL HEART SCORE CT: ICD-10-CM

## 2020-10-07 DIAGNOSIS — I25.10 ATHEROSCLEROSIS OF NATIVE CORONARY ARTERY OF NATIVE HEART, ANGINA PRESENCE UNSPECIFIED: ICD-10-CM

## 2020-10-07 DIAGNOSIS — I45.10 RBBB: ICD-10-CM

## 2020-10-07 DIAGNOSIS — E78.00 PURE HYPERCHOLESTEROLEMIA: ICD-10-CM

## 2020-10-07 DIAGNOSIS — R42 EPISODIC LIGHTHEADEDNESS: ICD-10-CM

## 2020-10-07 RX ORDER — LISINOPRIL 10 MG/1
TABLET ORAL
Qty: 90 TABLET | Refills: 0 | Status: SHIPPED | OUTPATIENT
Start: 2020-10-07 | End: 2020-12-18

## 2020-10-07 NOTE — TELEPHONE ENCOUNTER
Last refilled 7/13/20 #90 with 0 RF  LOV with [address] 2/5/20  Future nurse appt 10/12/20   Medication passes protocol due to following reasons:  Last CMP 1/9/20  Hypertension Medications Protocol Atcyrd59/07/2020 08:58 AM   CMP or BMP in past 12 months Protocol

## 2020-10-12 ENCOUNTER — IMMUNIZATION (OUTPATIENT)
Dept: FAMILY MEDICINE CLINIC | Facility: CLINIC | Age: 70
End: 2020-10-12
Payer: MEDICARE

## 2020-10-12 DIAGNOSIS — E78.00 PURE HYPERCHOLESTEROLEMIA: ICD-10-CM

## 2020-10-12 DIAGNOSIS — Z23 NEED FOR VACCINATION: ICD-10-CM

## 2020-10-12 PROCEDURE — G0008 ADMIN INFLUENZA VIRUS VAC: HCPCS | Performed by: FAMILY MEDICINE

## 2020-10-12 PROCEDURE — 90662 IIV NO PRSV INCREASED AG IM: CPT | Performed by: FAMILY MEDICINE

## 2020-10-12 RX ORDER — ATORVASTATIN CALCIUM 20 MG/1
TABLET, FILM COATED ORAL
Qty: 90 TABLET | Refills: 0 | Status: SHIPPED | OUTPATIENT
Start: 2020-10-12 | End: 2021-01-12

## 2020-10-12 NOTE — TELEPHONE ENCOUNTER
Cholesterol Medication Protocol Hpwvfm63/12/2020 12:00 PM   ALT < 80 Protocol Details    ALT resulted within past year     Lipid panel within past 12 months     Appointment within past 12 or next 3 months      Last refill 7/16/20 #90 0 refill  Last lipid 1

## 2020-11-11 ENCOUNTER — LAB ENCOUNTER (OUTPATIENT)
Dept: LAB | Age: 70
End: 2020-11-11
Attending: INTERNAL MEDICINE
Payer: MEDICARE

## 2020-11-11 DIAGNOSIS — E78.00 PURE HYPERCHOLESTEROLEMIA: ICD-10-CM

## 2020-11-11 DIAGNOSIS — I25.10 ATHEROSCLEROSIS OF NATIVE CORONARY ARTERY OF NATIVE HEART, ANGINA PRESENCE UNSPECIFIED: ICD-10-CM

## 2020-11-11 DIAGNOSIS — R93.1 ABNORMAL HEART SCORE CT: ICD-10-CM

## 2020-11-11 DIAGNOSIS — I10 ESSENTIAL HYPERTENSION: ICD-10-CM

## 2020-11-11 DIAGNOSIS — I73.9 PVD (PERIPHERAL VASCULAR DISEASE) (HCC): ICD-10-CM

## 2020-11-11 DIAGNOSIS — I45.10 RBBB: ICD-10-CM

## 2020-11-11 PROCEDURE — 80061 LIPID PANEL: CPT

## 2020-11-11 PROCEDURE — 80053 COMPREHEN METABOLIC PANEL: CPT

## 2020-11-11 PROCEDURE — 36415 COLL VENOUS BLD VENIPUNCTURE: CPT

## 2020-12-18 DIAGNOSIS — E78.00 PURE HYPERCHOLESTEROLEMIA: ICD-10-CM

## 2020-12-18 DIAGNOSIS — I45.10 RBBB: ICD-10-CM

## 2020-12-18 DIAGNOSIS — R42 EPISODIC LIGHTHEADEDNESS: ICD-10-CM

## 2020-12-18 DIAGNOSIS — I25.10 ATHEROSCLEROSIS OF NATIVE CORONARY ARTERY OF NATIVE HEART, ANGINA PRESENCE UNSPECIFIED: ICD-10-CM

## 2020-12-18 DIAGNOSIS — I10 ESSENTIAL HYPERTENSION: ICD-10-CM

## 2020-12-18 DIAGNOSIS — R93.1 ABNORMAL HEART SCORE CT: ICD-10-CM

## 2020-12-18 RX ORDER — LISINOPRIL 10 MG/1
10 TABLET ORAL DAILY
Qty: 90 TABLET | Refills: 0 | Status: SHIPPED | OUTPATIENT
Start: 2020-12-18 | End: 2021-03-26

## 2021-01-12 ENCOUNTER — TELEPHONE (OUTPATIENT)
Dept: FAMILY MEDICINE CLINIC | Facility: CLINIC | Age: 71
End: 2021-01-12

## 2021-01-12 DIAGNOSIS — E78.00 PURE HYPERCHOLESTEROLEMIA: ICD-10-CM

## 2021-01-12 RX ORDER — ATORVASTATIN CALCIUM 20 MG/1
20 TABLET, FILM COATED ORAL EVERY EVENING
Qty: 90 TABLET | Refills: 0 | Status: SHIPPED | OUTPATIENT
Start: 2021-01-12 | End: 2021-04-13

## 2021-01-12 NOTE — TELEPHONE ENCOUNTER
Last OV 2/5/2020  Last lab 11/11/2020 lipid, CMP/ALT 20  Last refilled 10/12/2020  #90  0 refills    Future Appointments   Date Time Provider Irene Mcnamara   2/16/2021 10:30 AM Mandy Howard MD [facility] Praveen Candelario   7/13/2021 11:00 AM Nae Purdy

## 2021-02-16 ENCOUNTER — OFFICE VISIT (OUTPATIENT)
Dept: FAMILY MEDICINE CLINIC | Facility: CLINIC | Age: 71
End: 2021-02-16
Payer: MEDICARE

## 2021-02-16 VITALS
SYSTOLIC BLOOD PRESSURE: 124 MMHG | TEMPERATURE: 98 F | OXYGEN SATURATION: 98 % | HEIGHT: 70 IN | WEIGHT: 171 LBS | BODY MASS INDEX: 24.48 KG/M2 | HEART RATE: 83 BPM | DIASTOLIC BLOOD PRESSURE: 70 MMHG

## 2021-02-16 DIAGNOSIS — L57.0 ACTINIC KERATOSES: ICD-10-CM

## 2021-02-16 DIAGNOSIS — J84.9 INTERSTITIAL LUNG DISEASE (HCC): ICD-10-CM

## 2021-02-16 DIAGNOSIS — Z00.00 ENCOUNTER FOR ANNUAL HEALTH EXAMINATION: Primary | ICD-10-CM

## 2021-02-16 DIAGNOSIS — I45.10 RBBB: ICD-10-CM

## 2021-02-16 DIAGNOSIS — I10 ESSENTIAL HYPERTENSION: ICD-10-CM

## 2021-02-16 DIAGNOSIS — Z92.89 H/O ECHOCARDIOGRAM: ICD-10-CM

## 2021-02-16 DIAGNOSIS — E78.00 PURE HYPERCHOLESTEROLEMIA: ICD-10-CM

## 2021-02-16 DIAGNOSIS — R35.1 BENIGN PROSTATIC HYPERPLASIA WITH NOCTURIA: ICD-10-CM

## 2021-02-16 DIAGNOSIS — R93.1 ABNORMAL HEART SCORE CT: ICD-10-CM

## 2021-02-16 DIAGNOSIS — K21.00 GASTROESOPHAGEAL REFLUX DISEASE WITH ESOPHAGITIS WITHOUT HEMORRHAGE: ICD-10-CM

## 2021-02-16 DIAGNOSIS — N52.9 ERECTILE DYSFUNCTION, UNSPECIFIED ERECTILE DYSFUNCTION TYPE: ICD-10-CM

## 2021-02-16 DIAGNOSIS — N40.1 BENIGN PROSTATIC HYPERPLASIA WITH NOCTURIA: ICD-10-CM

## 2021-02-16 DIAGNOSIS — I25.10 ATHEROSCLEROSIS OF NATIVE CORONARY ARTERY OF NATIVE HEART, ANGINA PRESENCE UNSPECIFIED: ICD-10-CM

## 2021-02-16 DIAGNOSIS — K22.70 BARRETT'S ESOPHAGUS WITHOUT DYSPLASIA: ICD-10-CM

## 2021-02-16 DIAGNOSIS — I20.8 ANGINAL EQUIVALENT (HCC): ICD-10-CM

## 2021-02-16 DIAGNOSIS — Z86.010 PERSONAL HISTORY OF COLONIC POLYPS: ICD-10-CM

## 2021-02-16 PROCEDURE — G0439 PPPS, SUBSEQ VISIT: HCPCS | Performed by: FAMILY MEDICINE

## 2021-02-16 RX ORDER — MULTIVIT-MIN/IRON/FOLIC ACID/K 18-600-40
2000 CAPSULE ORAL DAILY
COMMUNITY
Start: 2021-02-16

## 2021-02-16 NOTE — PROGRESS NOTES
HPI:   Kurtis Masters is a 65year old male who presents for a {Medicare Annual Wellness Description:3400}.     ***  His last annual assessment has been over 1 year: Annual Physical due on 02/16/2022         Fall/Risk Assessment Update needed    Last [place] it on his medication list.   CAGE Alcohol screening   Omega Sonam was screened for Alcohol abuse and had a score of 0 so is at low risk.      Patient Care Team: Patient Care Team:  Mor Macedo MD as PCP - [facility] [place])  Librado Emmanuel (07/2017); and egd (N/A, 7/20/2017). His family history includes Cancer in his father, mother, and sister; Diabetes in his father and mother. SOCIAL HISTORY:   He  reports that he has quit smoking.  He has never used smokeless tobacco. He reports curre visit:    Encounter for annual health examination         Diet assessment: {good/fair/poor/excellent:13144840::\"good\"}     PLAN:  The patient indicates understanding of these issues and agrees to the plan.   {lifestyle and A/P options:5845::\"Reinforced h Immunizations (Update Immunization Activity if applicable)     Influenza  Covered Annually 10/12/2020   Please get every year    Pneumococcal 15 (Prevnar)  Covered Once after 65 04/20/2015 Please get once after your 65th birthday    Pneumococcal 23 (Pn

## 2021-02-16 NOTE — PATIENT INSTRUCTIONS
Demarco Conway's SCREENING SCHEDULE   Tests on this list are recommended by your physician but may not be covered, or covered at this frequency, by your insurer. Please check with your insurance carrier before scheduling to verify coverage.     PREVENTAT cigarettes in their lifetime   • Anyone with a family history    Colorectal Cancer Screening Covered up to Age 76     Colonoscopy Screen   Covered every 10 years- more often if abnormal Colonoscopy due on 07/20/2022 Update Health [facility] if applicable mentally retarded   Persons who live in the same house as a HepB virus carrier   Homosexual men   Illicit injectable drug abusers     Tetanus Toxoid- Only covered with a cut with metal- TD and TDaP Not covered by Medicare Part B) Orders placed or performed

## 2021-02-16 NOTE — PROGRESS NOTES
HPI:   Justine Reardon is a 65year old male who presents for a Medicare Subsequent Annual Wellness visit (Pt already had Initial Annual Wellness). Seeing cards once a year, last in October, everything good. Seeing derm once a year. No new concerns. Care Team: Patient Care Team:  Zain Horn MD as PCP - [facility])  Ansley Metz DO as PCP - Nelda Ng MD (GASTROENTEROLOGY)    Patient Active Problem List:     Personal history of colonic polyps     Barnhart's esophagus w omega-3 fatty acids 1000 MG Oral Cap, Take 1,000 mg by mouth daily. •  aspirin 81 MG Oral Chew Tab, Chew 81 mg by mouth daily.        MEDICAL INFORMATION:   He  has a past medical history of Actinic keratosis, colonic polyp, Hyperlipidemia, and Unspecifi cooperative, no distress, appears stated age   Head:  Normocephalic, without obvious abnormality, atraumatic   Eyes:  PERRL, conjunctiva/corneas clear, EOM's intact, both eyes   Ears:  Normal TM's and external ear canals, both ears           Neck: Supple, annual health examination  -Our lifestyle habits account for about 80% of our health outcomes. Current research suggests a plant based diet with 5-10 servings of fruits/veggies daily, rich in legumes and complex grains is a healthy foundation for eating. 2/16/2021     General Health     In the past six months, have you lost more than 10 pounds without trying?: 2 - No  Has your appetite been poor?: No  How does the patient maintain a good energy level?: Appropriate Exercise  How would you describe your curr Hepatitis B for Moderate/High Risk No vaccine history found Medium/high risk factors:   End-stage renal disease   Hemophiliacs who received Factor VIII or IX concentrates   Clients of institutions for the mentally retarded   Persons who live in the same

## 2021-03-18 RX ORDER — TADALAFIL 5 MG/1
5 TABLET ORAL DAILY
Qty: 90 TABLET | Refills: 1 | Status: SHIPPED | OUTPATIENT
Start: 2021-03-18 | End: 2021-09-16

## 2021-03-18 NOTE — TELEPHONE ENCOUNTER
LOV 2/16/21    LAST LAB 1/9/20, 11/11/20 for specialist    LAST RX 8/10/20 #90/1rf    Next OV None scheduled    PROTOCOL

## 2021-03-18 NOTE — TELEPHONE ENCOUNTER
Pt called to request a 90 day refill of the following medication.  He looked for the best price & [place] [address] in [place] was the lowest.     TADALAFIL 5 MG Oral Tab    [id number] - [address], [phone]

## 2021-03-26 DIAGNOSIS — R42 EPISODIC LIGHTHEADEDNESS: ICD-10-CM

## 2021-03-26 DIAGNOSIS — I45.10 RBBB: ICD-10-CM

## 2021-03-26 DIAGNOSIS — I10 ESSENTIAL HYPERTENSION: ICD-10-CM

## 2021-03-26 DIAGNOSIS — I25.10 ATHEROSCLEROSIS OF NATIVE CORONARY ARTERY OF NATIVE HEART, ANGINA PRESENCE UNSPECIFIED: ICD-10-CM

## 2021-03-26 DIAGNOSIS — E78.00 PURE HYPERCHOLESTEROLEMIA: ICD-10-CM

## 2021-03-26 DIAGNOSIS — R93.1 ABNORMAL HEART SCORE CT: ICD-10-CM

## 2021-03-26 RX ORDER — LISINOPRIL 10 MG/1
10 TABLET ORAL DAILY
Qty: 90 TABLET | Refills: 1 | Status: SHIPPED | OUTPATIENT
Start: 2021-03-26 | End: 2021-10-04

## 2021-03-26 NOTE — TELEPHONE ENCOUNTER
Refilled per protocol   Hypertension Medications Protocol Ldwqkv3903/26/2021 12:11 PM   CMP or BMP in past 12 months Protocol Details    Last serum creatinine< 2.0     Appointment in past 6 or next 3 months swing-through gait

## 2021-04-12 DIAGNOSIS — E78.00 PURE HYPERCHOLESTEROLEMIA: ICD-10-CM

## 2021-04-13 RX ORDER — ATORVASTATIN CALCIUM 20 MG/1
TABLET, FILM COATED ORAL
Qty: 90 TABLET | Refills: 0 | Status: SHIPPED | OUTPATIENT
Start: 2021-04-13 | End: 2021-07-12

## 2021-04-13 NOTE — TELEPHONE ENCOUNTER
Cholesterol Medication Protocol Gyrubi4404/12/2021 07:37 PM   ALT < 80 Protocol Details    ALT resulted within past year     Lipid panel within past 12 months     Appointment within past 12 or next 3 months      Refilled per protocol.

## 2021-05-04 RX ORDER — FLUTICASONE PROPIONATE 50 MCG
2 SPRAY, SUSPENSION (ML) NASAL DAILY
Qty: 3 BOTTLE | Refills: 3 | Status: SHIPPED | OUTPATIENT
Start: 2021-05-04

## 2021-07-12 DIAGNOSIS — E78.00 PURE HYPERCHOLESTEROLEMIA: ICD-10-CM

## 2021-07-12 RX ORDER — ATORVASTATIN CALCIUM 20 MG/1
TABLET, FILM COATED ORAL
Qty: 90 TABLET | Refills: 0 | Status: SHIPPED | OUTPATIENT
Start: 2021-07-12 | End: 2021-10-04

## 2021-07-12 NOTE — TELEPHONE ENCOUNTER
Cholesterol Medication Protocol Gipctn6407/12/2021 04:06 PM   ALT < 80 Protocol Details    ALT resulted within past year     Lipid panel within past 12 months     Appointment within past 12 or next 3 months      Refilled x 3 months per protocol

## 2021-08-04 DIAGNOSIS — K21.00 GASTROESOPHAGEAL REFLUX DISEASE WITH ESOPHAGITIS: ICD-10-CM

## 2021-08-04 DIAGNOSIS — K22.70 BARRETT'S ESOPHAGUS WITHOUT DYSPLASIA: ICD-10-CM

## 2021-08-04 DIAGNOSIS — Z86.010 PERSONAL HISTORY OF COLONIC POLYPS: ICD-10-CM

## 2021-08-04 RX ORDER — OMEPRAZOLE 20 MG/1
CAPSULE, DELAYED RELEASE ORAL
Qty: 90 CAPSULE | Refills: 3 | Status: SHIPPED | OUTPATIENT
Start: 2021-08-04

## 2021-09-16 RX ORDER — TADALAFIL 5 MG/1
5 TABLET ORAL DAILY
Qty: 90 TABLET | Refills: 1 | Status: SHIPPED | OUTPATIENT
Start: 2021-09-16

## 2021-09-16 NOTE — TELEPHONE ENCOUNTER
Patient called to check the status of osco refill request.    [facility]il 5 MG [facility] [address] [place] [address] [phone], [phone]    Please advise # [phone]

## 2021-09-16 NOTE — TELEPHONE ENCOUNTER
LOV 02/16/2021 w/ [facility]    Last refill on 03/18/2021, for #90 tabs, with 1 refills  tadalafil 5 MG Oral Tab    Future Appointments   Date Time Provider Irene Mcnamara   10/28/2021  9:00 AM Shmuel Warren MD [facility]       Order(s) pending, ple

## 2021-10-04 DIAGNOSIS — E78.00 PURE HYPERCHOLESTEROLEMIA: ICD-10-CM

## 2021-10-04 DIAGNOSIS — I25.10 ATHEROSCLEROSIS OF NATIVE CORONARY ARTERY OF NATIVE HEART: ICD-10-CM

## 2021-10-04 DIAGNOSIS — I10 ESSENTIAL HYPERTENSION: ICD-10-CM

## 2021-10-04 DIAGNOSIS — R42 EPISODIC LIGHTHEADEDNESS: ICD-10-CM

## 2021-10-04 DIAGNOSIS — R93.1 ABNORMAL HEART SCORE CT: ICD-10-CM

## 2021-10-04 DIAGNOSIS — I45.10 RBBB: ICD-10-CM

## 2021-10-04 RX ORDER — LISINOPRIL 10 MG/1
10 TABLET ORAL DAILY
Qty: 90 TABLET | Refills: 1 | Status: SHIPPED | OUTPATIENT
Start: 2021-10-04 | End: 2021-10-28

## 2021-10-04 RX ORDER — ATORVASTATIN CALCIUM 20 MG/1
20 TABLET, FILM COATED ORAL EVERY EVENING
Qty: 90 TABLET | Refills: 0 | Status: SHIPPED | OUTPATIENT
Start: 2021-10-04 | End: 2021-10-28

## 2021-10-04 NOTE — TELEPHONE ENCOUNTER
Hypertension Medications Protocol Failed 10/04/2021 01:31 PM   Protocol Details  Appointment in past 6 or next 3 months    CMP or BMP in past 12 months    Last serum creatinine< 2.0

## 2021-10-15 ENCOUNTER — OFFICE VISIT (OUTPATIENT)
Dept: FAMILY MEDICINE CLINIC | Facility: CLINIC | Age: 71
End: 2021-10-15
Payer: MEDICARE

## 2021-10-15 VITALS
WEIGHT: 173.13 LBS | BODY MASS INDEX: 24.79 KG/M2 | HEART RATE: 81 BPM | OXYGEN SATURATION: 98 % | RESPIRATION RATE: 16 BRPM | TEMPERATURE: 97 F | HEIGHT: 70 IN | SYSTOLIC BLOOD PRESSURE: 132 MMHG | DIASTOLIC BLOOD PRESSURE: 68 MMHG

## 2021-10-15 DIAGNOSIS — R20.0 NUMBNESS AND TINGLING OF LOWER EXTREMITY: ICD-10-CM

## 2021-10-15 DIAGNOSIS — Z71.84 COUNSELING ABOUT TRAVEL: ICD-10-CM

## 2021-10-15 DIAGNOSIS — G89.29 CHRONIC RIGHT-SIDED LOW BACK PAIN, UNSPECIFIED WHETHER SCIATICA PRESENT: Primary | ICD-10-CM

## 2021-10-15 DIAGNOSIS — M54.50 CHRONIC RIGHT-SIDED LOW BACK PAIN, UNSPECIFIED WHETHER SCIATICA PRESENT: Primary | ICD-10-CM

## 2021-10-15 DIAGNOSIS — Z71.89 ENCOUNTER FOR MEDICATION REVIEW AND COUNSELING: ICD-10-CM

## 2021-10-15 DIAGNOSIS — R20.2 NUMBNESS AND TINGLING OF LOWER EXTREMITY: ICD-10-CM

## 2021-10-15 PROCEDURE — 99214 OFFICE O/P EST MOD 30 MIN: CPT | Performed by: FAMILY MEDICINE

## 2021-10-15 NOTE — PROGRESS NOTES
[facility] Family Medicine Office Note  Chief Complaint:   Patient presents with:  Numbness: Down right leg x months. No pain. Hx of siatica nerve pain. Other: would like to discuss taking magnesium for long plane ride.  Should he be taking Aspi No    Family History:  Family History   Problem Relation Age of Onset   • Cancer Father         pancreatic   • Diabetes Father    • Cancer Mother         breast   • Diabetes Mother    • Cancer Sister         melanoma-cancer free   • Heart Disease Neg    • conversation, answering appropriately   SKIN: No pallor, no erythema, no cyanosis, warm and dry  Eyes: wnl, normal conjunctiva   HEAD: Normocephalic, atraumatic  EENT: OP - wnl, moist, Nares normal  NECK: FROM, supple  Back: No tenderness noted over the katlyn was spent on reviewing prior hospital / clinic noted, labs, medications, other tests and medical decision making.   Appropriate medical decision-making and tests are ordered as detailed in the plan of care above      Meds & Refills for this Visit:  Requeste

## 2021-10-16 NOTE — PROGRESS NOTES
Spoke with patient and spouse regarding making an apt at the travel clinic. Provided phone number. Patient and spouse v/u.

## 2021-10-31 ENCOUNTER — OFFICE VISIT (OUTPATIENT)
Dept: FAMILY MEDICINE CLINIC | Facility: CLINIC | Age: 71
End: 2021-10-31
Payer: MEDICARE

## 2021-10-31 VITALS
HEART RATE: 78 BPM | DIASTOLIC BLOOD PRESSURE: 78 MMHG | OXYGEN SATURATION: 99 % | RESPIRATION RATE: 18 BRPM | SYSTOLIC BLOOD PRESSURE: 142 MMHG

## 2021-10-31 DIAGNOSIS — Z11.52 ENCOUNTER FOR SCREENING FOR COVID-19: Primary | ICD-10-CM

## 2021-10-31 PROCEDURE — 99212 OFFICE O/P EST SF 10 MIN: CPT | Performed by: NURSE PRACTITIONER

## 2021-10-31 NOTE — PROGRESS NOTES
CHIEF COMPLAINT:   Patient presents with:  Covid-19 Test: For travel      HPI:   Colt Kong is a 66year old male who presents for Covid 19 test for travel. Going to [address] [place] [address] in 3 days to visi son.  .  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

## 2021-11-02 ENCOUNTER — TELEMEDICINE (OUTPATIENT)
Dept: FAMILY MEDICINE CLINIC | Facility: CLINIC | Age: 71
End: 2021-11-02
Payer: MEDICARE

## 2021-11-02 ENCOUNTER — TELEPHONE (OUTPATIENT)
Dept: FAMILY MEDICINE CLINIC | Facility: CLINIC | Age: 71
End: 2021-11-02

## 2021-11-02 DIAGNOSIS — R09.81 NASAL SINUS CONGESTION: Primary | ICD-10-CM

## 2021-11-02 PROCEDURE — 99214 OFFICE O/P EST MOD 30 MIN: CPT | Performed by: FAMILY MEDICINE

## 2021-11-02 RX ORDER — CEFDINIR 300 MG/1
300 CAPSULE ORAL 2 TIMES DAILY
Qty: 20 CAPSULE | Refills: 0 | Status: SHIPPED | OUTPATIENT
Start: 2021-11-02 | End: 2021-11-12

## 2021-11-02 NOTE — PROGRESS NOTES
My Chart/ Video/Telephone Visit Check-In Due to [address] verbally consents a video Check-In service on 11/02/21.   Patient understands and accepts financial responsibility for any deductible, co-insurance and/or co-pays associated by mouth daily.           History:  Past Medical History:   Diagnosis Date   • Actinic keratosis    • Hx of colonic polyp    • Hyperlipidemia    • Unspecified essential hypertension       Past Surgical History:   Procedure Laterality Date   • COLONOSCOPY  0 PLAN    1. Nasal sinus congestion  Appears to have an early sinusitis  Take prescribed medications as directed. He does not have a true PCN allergy  Patient recommended Afrin 2 sprays bid x 3 days only, otherwise rebound congestion may occur.   Rest and p

## 2021-11-02 NOTE — TELEPHONE ENCOUNTER
Future Appointments   Date Time Provider Irene Naima   11/2/2021 12:00 PM Gustabo Hoover MD [facility] [place]   11/30/2021  7:30 AM BAYLEE OMSLEY   10/25/2022  9:40 AM Magy Warren MD [facility] DULY SPALD

## 2021-11-02 NOTE — TELEPHONE ENCOUNTER
[facility], [address] [place] [address] [phone], [phone]    Pt called would like to know if he can have something prescribed for his sinus states has drainage when traveling he would be going out of town tomorrow    Pt call back # [phone]

## 2021-11-02 NOTE — TELEPHONE ENCOUNTER
Patient is flying to [facility] tomorrow and his head is stuffed up and drainage is running down his throat. States he is coughing due to drainage. Cough non productive  Symptoms started 2 days ago.   states he gets this every year and will need and antibiotic

## 2021-11-22 ENCOUNTER — TELEPHONE (OUTPATIENT)
Dept: FAMILY MEDICINE CLINIC | Facility: CLINIC | Age: 71
End: 2021-11-22

## 2021-11-22 NOTE — TELEPHONE ENCOUNTER
As long as he remains afebrile, and has no Shortness of breath at rest, please educate patient on rest and hydration and avoiding any strenuous activity at this time. Make sure to use nasal saline or OTC Mucinex to help with congestion.  Salt water gargling

## 2021-11-22 NOTE — TELEPHONE ENCOUNTER
Pt reports before leaving for overseas to [place]  Pt c/o ear/nasal congestion and had Video visit with Dr. Irina Dominguez on 11/02/2021  Was Rx'd Cefdinir - pt reports he felt better    [place] felt [place] - all [place] - walked and climbed no problems    C

## 2021-11-22 NOTE — TELEPHONE ENCOUNTER
Patient advised of Doctor's note below.  Patient verbalized understanding and stated that he's been doing all of what was mentioned, except for vitamin C  Pt stated will start taking vitamin C  Pt advised to call office back if symptoms worsen/not improve a

## 2021-11-22 NOTE — TELEPHONE ENCOUNTER
Pt called he spoke with Conor Daley on 11/02 he was given medication. He states that he felt better for a little while and now he is back to feeling sick.  Pt is wanting to know if he should come into see the dr or if the dr wants to send in another round o

## 2021-11-22 NOTE — TELEPHONE ENCOUNTER
I do not recommend another antibiotic at this time. Vitamin C 2000 mg, Vitamin D 2000 internation units, probiotics and rest and hydration over the next 2-3 days. If symptoms still present I would like to see him in the office.  Repetitive abx courses are n

## 2021-11-22 NOTE — TELEPHONE ENCOUNTER
Patient advised of Doctor's note below. Patient verbalized understanding.      Patient reports he has been doing all of the mentioned home remedies - has been using alihsa pot at home - pt states he's had this before    Started when he came back from travell

## 2021-12-14 ENCOUNTER — OFFICE VISIT (OUTPATIENT)
Dept: FAMILY MEDICINE CLINIC | Facility: CLINIC | Age: 71
End: 2021-12-14
Payer: MEDICARE

## 2021-12-14 VITALS
SYSTOLIC BLOOD PRESSURE: 142 MMHG | WEIGHT: 170 LBS | RESPIRATION RATE: 16 BRPM | HEART RATE: 80 BPM | DIASTOLIC BLOOD PRESSURE: 70 MMHG | BODY MASS INDEX: 23.8 KG/M2 | HEIGHT: 71 IN | TEMPERATURE: 98 F | OXYGEN SATURATION: 99 %

## 2021-12-14 DIAGNOSIS — J01.11 ACUTE RECURRENT FRONTAL SINUSITIS: Primary | ICD-10-CM

## 2021-12-14 PROCEDURE — 99213 OFFICE O/P EST LOW 20 MIN: CPT | Performed by: FAMILY MEDICINE

## 2021-12-14 RX ORDER — DOXYCYCLINE HYCLATE 100 MG
100 TABLET ORAL 2 TIMES DAILY
Qty: 20 TABLET | Refills: 0 | Status: SHIPPED | OUTPATIENT
Start: 2021-12-14

## 2021-12-14 NOTE — PROGRESS NOTES
Mateo Britt is a 66year old male. S:  Patient presents today with the following concerns:  · 1 month ago went overseas. Had sinus issues. On cefdinir. Symptoms returned 3 days after stopping medications.   Took home covid test last night and nega Abnormal Heart Score CT     RBBB     H/O echocardiogram     Interstitial lung disease (HCC)     Normal cardiac stress test    Family History   Problem Relation Age of Onset   • Cancer Father         pancreatic   • Diabetes Father    • Cancer Mother sinuses. Follow up if symptoms worsen or do not improve. Should follow up with PCP at that time. Patient verbalizes understanding of treatment plan.

## 2022-03-07 RX ORDER — TADALAFIL 5 MG/1
5 TABLET ORAL DAILY
Qty: 90 TABLET | Refills: 0 | Status: SHIPPED | OUTPATIENT
Start: 2022-03-07

## 2022-03-07 NOTE — TELEPHONE ENCOUNTER
No refill protocol for this medication. Last refill: 9/16/2021 #90 with 1 refill  Last Visit: 11/02/2021 Telemed  Next Visit:   Future Appointments   Date Time Provider Irene Mcnamara   4/7/2022  8:00 AM Laine Juárez MD [facility] DAMON Porter         Forward to Dr. Yolie Mccauley please advise on refills. Thanks.

## 2022-03-14 ENCOUNTER — HOSPITAL ENCOUNTER (OUTPATIENT)
Age: 72
Discharge: HOME | End: 2022-03-14
Payer: MEDICARE

## 2022-03-14 VITALS
HEART RATE: 70 BPM | SYSTOLIC BLOOD PRESSURE: 138 MMHG | DIASTOLIC BLOOD PRESSURE: 80 MMHG | OXYGEN SATURATION: 97 % | TEMPERATURE: 98 F | RESPIRATION RATE: 16 BRPM

## 2022-03-14 DIAGNOSIS — H69.82 DYSFUNCTION OF LEFT EUSTACHIAN TUBE: Primary | ICD-10-CM

## 2022-03-14 PROCEDURE — 99213 OFFICE O/P EST LOW 20 MIN: CPT | Performed by: NURSE PRACTITIONER

## 2022-03-14 RX ORDER — METHYLPREDNISOLONE 4 MG/1
TABLET ORAL
Qty: 1 EACH | Refills: 0 | Status: SHIPPED | OUTPATIENT
Start: 2022-03-14 | End: 2022-03-31 | Stop reason: ALTCHOICE

## 2022-03-14 NOTE — ED INITIAL ASSESSMENT (HPI)
Pt reports pain in ears and post nasal drip for the past 2-3 days. States now his left ear has worsened.

## 2022-03-21 ENCOUNTER — TELEPHONE (OUTPATIENT)
Dept: FAMILY MEDICINE CLINIC | Facility: CLINIC | Age: 72
End: 2022-03-21

## 2022-03-21 NOTE — TELEPHONE ENCOUNTER
Pt finished the steroid pack and feels did not help. Pt was using codeine cough medicine and would help him sleep but he would wake up after about 2 hours still coughing. Denies fever. He states sinuses are killing him and having post nasal drip is keeping him up with coughing. Complaining of nasal congestion. Mucinex is not helping. Forward to Dr. Bib Cunningham, please advise.

## 2022-03-21 NOTE — TELEPHONE ENCOUNTER
Patient states he was seen in urgent care and given 6 days of meds.     He is still up all night coughing      Please adv      Thank you

## 2022-03-22 RX ORDER — AZITHROMYCIN 250 MG/1
TABLET, FILM COATED ORAL
Qty: 6 TABLET | Refills: 0 | Status: SHIPPED | OUTPATIENT
Start: 2022-03-22 | End: 2022-03-27

## 2022-03-23 NOTE — TELEPHONE ENCOUNTER
Spoke with patient, advised note below. Patient states he is taking OTC coricidin HBP cough medication, is this ok? Patient will call if needs more cheritussin.      Future Appointments   Date Time Provider Irene Mcnamara   3/31/2022 11:40 AM Laine Brewer MD [facility] Patricia Jerez   4/7/2022  8:00 AM Laine Brewer MD [facility] Patricia Jerez   10/25/2022  9:40 AM MD LALI Riley

## 2022-03-23 NOTE — TELEPHONE ENCOUNTER
Please let him know I would like him to follow up with me next week if symptoms persist. At this time I will treat him for the possibility of early developing pneumonia (Rx Azithromycin sent to the pharmacy). Day time Mucinex / Robitussin for coughing spells. OTC Flonase 2 puffs each nostril and nasal saline rinses should help with nasal congestion.  If he needs more Cheratussin AC for night time cough I will send that as well (please let me know)

## 2022-03-23 NOTE — TELEPHONE ENCOUNTER
Per MM ok to take OTC cough medication, advised patient not to take Cheritussin and OTC cough medication together.

## 2022-03-24 ENCOUNTER — TELEPHONE (OUTPATIENT)
Dept: FAMILY MEDICINE CLINIC | Facility: CLINIC | Age: 72
End: 2022-03-24

## 2022-03-24 RX ORDER — CODEINE PHOSPHATE AND GUAIFENESIN 10; 100 MG/5ML; MG/5ML
10 SOLUTION ORAL EVERY 12 HOURS PRN
Qty: 200 ML | Refills: 0 | Status: SHIPPED | OUTPATIENT
Start: 2022-03-24 | End: 2022-04-03

## 2022-03-24 NOTE — TELEPHONE ENCOUNTER
Pt needs medication prescribed for a cough. Was told yesterday that he could call in if his cough didn't get better and doctor would prescribe  \"cussin\" cough medicine. Please send to:    [facility], [address] [place] [address] [phone], [address] [place] [id number]   Phone: [phone] Fax: [phone]     Please advise.

## 2022-03-31 ENCOUNTER — OFFICE VISIT (OUTPATIENT)
Dept: FAMILY MEDICINE CLINIC | Facility: CLINIC | Age: 72
End: 2022-03-31
Payer: MEDICARE

## 2022-03-31 VITALS
TEMPERATURE: 98 F | OXYGEN SATURATION: 98 % | RESPIRATION RATE: 18 BRPM | HEART RATE: 75 BPM | WEIGHT: 175 LBS | BODY MASS INDEX: 24 KG/M2 | DIASTOLIC BLOOD PRESSURE: 76 MMHG | SYSTOLIC BLOOD PRESSURE: 120 MMHG

## 2022-03-31 DIAGNOSIS — R09.82 POST-NASAL DRIP: ICD-10-CM

## 2022-03-31 DIAGNOSIS — R44.8 FACIAL PRESSURE: Primary | ICD-10-CM

## 2022-03-31 DIAGNOSIS — H65.93 MIDDLE EAR EFFUSION, BILATERAL: ICD-10-CM

## 2022-03-31 PROCEDURE — 99213 OFFICE O/P EST LOW 20 MIN: CPT | Performed by: FAMILY MEDICINE

## 2022-03-31 RX ORDER — DOXYCYCLINE HYCLATE 100 MG/1
100 CAPSULE ORAL 2 TIMES DAILY
Qty: 20 CAPSULE | Refills: 0 | Status: SHIPPED | OUTPATIENT
Start: 2022-03-31 | End: 2022-04-10

## 2022-03-31 RX ORDER — ATORVASTATIN CALCIUM 40 MG/1
TABLET, FILM COATED ORAL
COMMUNITY
Start: 2022-03-07

## 2022-03-31 RX ORDER — METHYLPREDNISOLONE 4 MG/1
TABLET ORAL
Qty: 21 EACH | Refills: 0 | Status: SHIPPED | OUTPATIENT
Start: 2022-03-31 | End: 2022-04-06

## 2022-04-07 ENCOUNTER — OFFICE VISIT (OUTPATIENT)
Dept: FAMILY MEDICINE CLINIC | Facility: CLINIC | Age: 72
End: 2022-04-07
Payer: MEDICARE

## 2022-04-07 VITALS
HEART RATE: 76 BPM | SYSTOLIC BLOOD PRESSURE: 128 MMHG | TEMPERATURE: 97 F | WEIGHT: 170.25 LBS | RESPIRATION RATE: 18 BRPM | BODY MASS INDEX: 24.1 KG/M2 | HEIGHT: 70.5 IN | OXYGEN SATURATION: 97 % | DIASTOLIC BLOOD PRESSURE: 76 MMHG

## 2022-04-07 DIAGNOSIS — K21.00 GASTROESOPHAGEAL REFLUX DISEASE WITH ESOPHAGITIS WITHOUT HEMORRHAGE: ICD-10-CM

## 2022-04-07 DIAGNOSIS — K22.70 BARRETT'S ESOPHAGUS WITHOUT DYSPLASIA: ICD-10-CM

## 2022-04-07 DIAGNOSIS — Z86.010 PERSONAL HISTORY OF COLONIC POLYPS: ICD-10-CM

## 2022-04-07 DIAGNOSIS — Z13.0 SCREENING, ANEMIA, DEFICIENCY, IRON: ICD-10-CM

## 2022-04-07 DIAGNOSIS — Z12.5 SCREENING FOR PROSTATE CANCER: ICD-10-CM

## 2022-04-07 DIAGNOSIS — R73.09 ELEVATED RANDOM BLOOD GLUCOSE LEVEL: ICD-10-CM

## 2022-04-07 DIAGNOSIS — Z00.00 ENCOUNTER FOR ANNUAL HEALTH EXAMINATION: Primary | ICD-10-CM

## 2022-04-07 DIAGNOSIS — E78.00 PURE HYPERCHOLESTEROLEMIA: ICD-10-CM

## 2022-04-07 DIAGNOSIS — I20.8 ANGINAL EQUIVALENT (HCC): ICD-10-CM

## 2022-04-07 DIAGNOSIS — Z13.1 SCREENING FOR DIABETES MELLITUS: ICD-10-CM

## 2022-04-07 DIAGNOSIS — R39.9 LOWER URINARY TRACT SYMPTOMS: ICD-10-CM

## 2022-04-07 DIAGNOSIS — N40.1 BENIGN PROSTATIC HYPERPLASIA WITH NOCTURIA: ICD-10-CM

## 2022-04-07 DIAGNOSIS — I10 ESSENTIAL HYPERTENSION: ICD-10-CM

## 2022-04-07 DIAGNOSIS — R35.1 BENIGN PROSTATIC HYPERPLASIA WITH NOCTURIA: ICD-10-CM

## 2022-04-07 DIAGNOSIS — R93.1 ABNORMAL HEART SCORE CT: ICD-10-CM

## 2022-04-07 DIAGNOSIS — Z23 NEED FOR VACCINATION: ICD-10-CM

## 2022-04-07 LAB
ALBUMIN SERPL-MCNC: 4.3 G/DL (ref 3.4–5)
ALBUMIN/GLOB SERPL: 1.3 {RATIO} (ref 1–2)
ALP LIVER SERPL-CCNC: 92 U/L
ALT SERPL-CCNC: 36 U/L
ANION GAP SERPL CALC-SCNC: 6 MMOL/L (ref 0–18)
AST SERPL-CCNC: 18 U/L (ref 15–37)
BASOPHILS # BLD AUTO: 0.04 X10(3) UL (ref 0–0.2)
BASOPHILS NFR BLD AUTO: 0.5 %
BILIRUB SERPL-MCNC: 1.4 MG/DL (ref 0.1–2)
BUN BLD-MCNC: 22 MG/DL (ref 7–18)
CALCIUM BLD-MCNC: 9.7 MG/DL (ref 8.5–10.1)
CHLORIDE SERPL-SCNC: 105 MMOL/L (ref 98–112)
CO2 SERPL-SCNC: 30 MMOL/L (ref 21–32)
COMPLEXED PSA SERPL-MCNC: 4.16 NG/ML (ref ?–4)
CREAT BLD-MCNC: 1.04 MG/DL
EOSINOPHIL # BLD AUTO: 0.29 X10(3) UL (ref 0–0.7)
EOSINOPHIL NFR BLD AUTO: 3.8 %
ERYTHROCYTE [DISTWIDTH] IN BLOOD BY AUTOMATED COUNT: 13.2 %
EST. AVERAGE GLUCOSE BLD GHB EST-MCNC: 117 MG/DL (ref 68–126)
FASTING STATUS PATIENT QL REPORTED: YES
GLOBULIN PLAS-MCNC: 3.2 G/DL (ref 2.8–4.4)
GLUCOSE BLD-MCNC: 99 MG/DL (ref 70–99)
HBA1C MFR BLD: 5.7 % (ref ?–5.7)
HCT VFR BLD AUTO: 47.2 %
HGB BLD-MCNC: 16.2 G/DL
IMM GRANULOCYTES # BLD AUTO: 0.05 X10(3) UL (ref 0–1)
IMM GRANULOCYTES NFR BLD: 0.7 %
LYMPHOCYTES # BLD AUTO: 1.72 X10(3) UL (ref 1–4)
LYMPHOCYTES NFR BLD AUTO: 22.6 %
MCH RBC QN AUTO: 32.6 PG (ref 26–34)
MCHC RBC AUTO-ENTMCNC: 34.3 G/DL (ref 31–37)
MCV RBC AUTO: 95 FL
MONOCYTES # BLD AUTO: 0.97 X10(3) UL (ref 0.1–1)
MONOCYTES NFR BLD AUTO: 12.7 %
NEUTROPHILS # BLD AUTO: 4.54 X10 (3) UL (ref 1.5–7.7)
NEUTROPHILS # BLD AUTO: 4.54 X10(3) UL (ref 1.5–7.7)
NEUTROPHILS NFR BLD AUTO: 59.7 %
OSMOLALITY SERPL CALC.SUM OF ELEC: 295 MOSM/KG (ref 275–295)
PLATELET # BLD AUTO: 222 10(3)UL (ref 150–450)
POTASSIUM SERPL-SCNC: 4.5 MMOL/L (ref 3.5–5.1)
PROT SERPL-MCNC: 7.5 G/DL (ref 6.4–8.2)
RBC # BLD AUTO: 4.97 X10(6)UL
SODIUM SERPL-SCNC: 141 MMOL/L (ref 136–145)
WBC # BLD AUTO: 7.6 X10(3) UL (ref 4–11)

## 2022-04-07 PROCEDURE — 85025 COMPLETE CBC W/AUTO DIFF WBC: CPT | Performed by: FAMILY MEDICINE

## 2022-04-07 PROCEDURE — 80053 COMPREHEN METABOLIC PANEL: CPT | Performed by: FAMILY MEDICINE

## 2022-04-07 PROCEDURE — G0439 PPPS, SUBSEQ VISIT: HCPCS | Performed by: FAMILY MEDICINE

## 2022-04-07 PROCEDURE — 83036 HEMOGLOBIN GLYCOSYLATED A1C: CPT | Performed by: FAMILY MEDICINE

## 2022-04-10 PROBLEM — R97.20 ELEVATED PSA, LESS THAN 10 NG/ML: Status: ACTIVE | Noted: 2022-04-10

## 2022-04-11 ENCOUNTER — TELEPHONE (OUTPATIENT)
Dept: FAMILY MEDICINE CLINIC | Facility: CLINIC | Age: 72
End: 2022-04-11

## 2022-04-11 NOTE — TELEPHONE ENCOUNTER
----- Message from Laine Jean MD sent at 4/10/2022  4:18 PM CDT -----  A1C ( a measure of average glucose and marker for pre-diabetes / diabetes) is mildly elevated indicating prediabetic state. Recommendations: Maintain your active lifestyle, and avoid sweets and simple carbohydrates in the your diet. This will help reduce the risk for progression to type 2 diabetes. PSA slight elevation noted, and this could be normal for his age, however considering hx of urinary retention in the past and his complaints of  taking a longer to empty his bladder  (during his MAW), I recommend rechecking PSA in 3 months. If still elevated and/or symptoms of extended urination are troublesome / worsening we will consider Urology consult (seen by Dr Layo Ramírez and group in 2018).

## 2022-06-06 RX ORDER — TADALAFIL 5 MG/1
5 TABLET ORAL DAILY
Qty: 90 TABLET | Refills: 0 | Status: SHIPPED | OUTPATIENT
Start: 2022-06-06

## 2022-07-12 ENCOUNTER — NURSE ONLY (OUTPATIENT)
Dept: FAMILY MEDICINE CLINIC | Facility: CLINIC | Age: 72
End: 2022-07-12
Payer: MEDICARE

## 2022-07-12 ENCOUNTER — OFFICE VISIT (OUTPATIENT)
Dept: FAMILY MEDICINE CLINIC | Facility: CLINIC | Age: 72
End: 2022-07-12
Payer: MEDICARE

## 2022-07-12 VITALS
DIASTOLIC BLOOD PRESSURE: 72 MMHG | BODY MASS INDEX: 24.61 KG/M2 | TEMPERATURE: 98 F | HEIGHT: 71 IN | WEIGHT: 175.81 LBS | HEART RATE: 84 BPM | SYSTOLIC BLOOD PRESSURE: 134 MMHG

## 2022-07-12 DIAGNOSIS — L25.5 RHUS DERMATITIS: Primary | ICD-10-CM

## 2022-07-12 DIAGNOSIS — Z12.5 SCREENING FOR PROSTATE CANCER: ICD-10-CM

## 2022-07-12 DIAGNOSIS — L03.313 CELLULITIS OF CHEST WALL: ICD-10-CM

## 2022-07-12 LAB — COMPLEXED PSA SERPL-MCNC: 3.36 NG/ML (ref ?–4)

## 2022-07-12 PROCEDURE — 99213 OFFICE O/P EST LOW 20 MIN: CPT | Performed by: FAMILY MEDICINE

## 2022-07-12 RX ORDER — METHYLPREDNISOLONE 4 MG/1
TABLET ORAL
Qty: 1 EACH | Refills: 0 | Status: SHIPPED | OUTPATIENT
Start: 2022-07-12

## 2022-07-12 RX ORDER — CEPHALEXIN 500 MG/1
500 CAPSULE ORAL 2 TIMES DAILY
Qty: 20 CAPSULE | Refills: 0 | Status: SHIPPED | OUTPATIENT
Start: 2022-07-12 | End: 2022-07-22

## 2022-08-01 DIAGNOSIS — K21.00 GASTROESOPHAGEAL REFLUX DISEASE WITH ESOPHAGITIS: ICD-10-CM

## 2022-08-01 DIAGNOSIS — Z86.010 PERSONAL HISTORY OF COLONIC POLYPS: ICD-10-CM

## 2022-08-01 DIAGNOSIS — K22.70 BARRETT'S ESOPHAGUS WITHOUT DYSPLASIA: ICD-10-CM

## 2022-08-01 RX ORDER — OMEPRAZOLE 20 MG/1
CAPSULE, DELAYED RELEASE ORAL
Qty: 90 CAPSULE | Refills: 3 | Status: SHIPPED | OUTPATIENT
Start: 2022-08-01

## 2022-08-31 NOTE — TELEPHONE ENCOUNTER
Routing to provider per protocol. TADALAFIL 5 MG Oral Tab  Last refilled on 6/6/22 for #90  with 0 rf. Last labs 11/30/21. Last seen on 7/12/22 w/DS. No future appointments. Thank you.

## 2022-09-01 RX ORDER — TADALAFIL 5 MG/1
5 TABLET ORAL DAILY
Qty: 90 TABLET | Refills: 0 | Status: SHIPPED | OUTPATIENT
Start: 2022-09-01

## 2022-12-02 RX ORDER — TADALAFIL 5 MG/1
5 TABLET ORAL DAILY
Qty: 90 TABLET | Refills: 0 | Status: SHIPPED | OUTPATIENT
Start: 2022-12-02

## 2022-12-02 NOTE — TELEPHONE ENCOUNTER
No refill protocol for this medication. Last refill: 9/01/2022 #90 with 0 refills  Last Visit: 7/12/2022  Next Visit: No future appointments. Forward to Dr. Zeenat Diaz please advise on refills. Thanks. A-T Advancement Flap Text: The defect edges were debeveled with a #15 scalpel blade.  Given the location of the defect, shape of the defect and the proximity to free margins an A-T advancement flap was deemed most appropriate.  Using a sterile surgical marker, an appropriate advancement flap was drawn incorporating the defect and placing the expected incisions within the relaxed skin tension lines where possible.    The area thus outlined was incised deep to adipose tissue with a #15 scalpel blade.  The skin margins were undermined to an appropriate distance in all directions utilizing iris scissors.

## 2023-01-18 RX ORDER — FLUTICASONE PROPIONATE 50 MCG
2 SPRAY, SUSPENSION (ML) NASAL DAILY
Qty: 3 EACH | Refills: 3 | Status: SHIPPED | OUTPATIENT
Start: 2023-01-18

## 2023-01-18 NOTE — TELEPHONE ENCOUNTER
Received fax from [facility] regarding refill request    LOV 07/12/22 with Dr. Britany Rogers  Last refill on 05/04/2021, for #3bottles, with 3 refills  Fluticasone Propionate 50 MCG/ACT Nasal Suspension  Allergy Medication Protocol Passed 01/18/2023 01:13 PM    Appointment in the past 12 or next 3 months     No future appointments.     Order per protocol  [employer]

## 2023-03-01 RX ORDER — TADALAFIL 5 MG/1
5 TABLET ORAL DAILY
Qty: 90 TABLET | Refills: 0 | Status: SHIPPED | OUTPATIENT
Start: 2023-03-01

## 2023-03-01 NOTE — TELEPHONE ENCOUNTER
Last OV:03/31/2022  Last refill:12/02/2022 90 tabs, 0 refill    Medication pended, please sign if appropriate

## 2023-04-06 ENCOUNTER — MED REC SCAN ONLY (OUTPATIENT)
Dept: FAMILY MEDICINE CLINIC | Facility: CLINIC | Age: 73
End: 2023-04-06

## 2023-05-18 ENCOUNTER — OFFICE VISIT (OUTPATIENT)
Dept: FAMILY MEDICINE CLINIC | Facility: CLINIC | Age: 73
End: 2023-05-18
Payer: MEDICARE

## 2023-05-18 VITALS
RESPIRATION RATE: 18 BRPM | HEART RATE: 94 BPM | WEIGHT: 176 LBS | HEIGHT: 71 IN | DIASTOLIC BLOOD PRESSURE: 76 MMHG | TEMPERATURE: 98 F | SYSTOLIC BLOOD PRESSURE: 122 MMHG | BODY MASS INDEX: 24.64 KG/M2 | OXYGEN SATURATION: 98 %

## 2023-05-18 DIAGNOSIS — Z86.010 PERSONAL HISTORY OF COLONIC POLYPS: ICD-10-CM

## 2023-05-18 DIAGNOSIS — K22.70 BARRETT'S ESOPHAGUS WITHOUT DYSPLASIA: ICD-10-CM

## 2023-05-18 DIAGNOSIS — R93.1 ABNORMAL HEART SCORE CT: Primary | ICD-10-CM

## 2023-05-18 DIAGNOSIS — E78.00 PURE HYPERCHOLESTEROLEMIA: ICD-10-CM

## 2023-05-18 DIAGNOSIS — I10 ESSENTIAL HYPERTENSION: ICD-10-CM

## 2023-05-18 DIAGNOSIS — R73.03 PREDIABETES: ICD-10-CM

## 2023-05-18 DIAGNOSIS — Z79.899 MEDICATION MANAGEMENT: ICD-10-CM

## 2023-05-18 PROCEDURE — 99213 OFFICE O/P EST LOW 20 MIN: CPT | Performed by: FAMILY MEDICINE

## 2023-05-19 ENCOUNTER — LAB ENCOUNTER (OUTPATIENT)
Dept: LAB | Age: 73
End: 2023-05-19
Attending: FAMILY MEDICINE
Payer: MEDICARE

## 2023-05-19 DIAGNOSIS — Z79.899 MEDICATION MANAGEMENT: ICD-10-CM

## 2023-05-19 DIAGNOSIS — Z86.010 PERSONAL HISTORY OF COLONIC POLYPS: ICD-10-CM

## 2023-05-19 DIAGNOSIS — I10 ESSENTIAL HYPERTENSION: ICD-10-CM

## 2023-05-19 DIAGNOSIS — R73.03 PREDIABETES: ICD-10-CM

## 2023-05-19 DIAGNOSIS — E78.00 PURE HYPERCHOLESTEROLEMIA: ICD-10-CM

## 2023-05-19 LAB
ALBUMIN SERPL-MCNC: 3.9 G/DL (ref 3.4–5)
ALBUMIN/GLOB SERPL: 1.2 {RATIO} (ref 1–2)
ALP LIVER SERPL-CCNC: 86 U/L
ALT SERPL-CCNC: 28 U/L
ANION GAP SERPL CALC-SCNC: 3 MMOL/L (ref 0–18)
AST SERPL-CCNC: 12 U/L (ref 15–37)
BILIRUB SERPL-MCNC: 1.1 MG/DL (ref 0.1–2)
BUN BLD-MCNC: 19 MG/DL (ref 7–18)
CALCIUM BLD-MCNC: 9.6 MG/DL (ref 8.5–10.1)
CHLORIDE SERPL-SCNC: 110 MMOL/L (ref 98–112)
CHOLEST SERPL-MCNC: 116 MG/DL (ref ?–200)
CO2 SERPL-SCNC: 28 MMOL/L (ref 21–32)
CREAT BLD-MCNC: 1.16 MG/DL
ERYTHROCYTE [DISTWIDTH] IN BLOOD BY AUTOMATED COUNT: 12.9 %
EST. AVERAGE GLUCOSE BLD GHB EST-MCNC: 111 MG/DL (ref 68–126)
FASTING PATIENT LIPID ANSWER: YES
FASTING STATUS PATIENT QL REPORTED: YES
GFR SERPLBLD BASED ON 1.73 SQ M-ARVRAT: 66 ML/MIN/1.73M2 (ref 60–?)
GLOBULIN PLAS-MCNC: 3.2 G/DL (ref 2.8–4.4)
GLUCOSE BLD-MCNC: 114 MG/DL (ref 70–99)
HBA1C MFR BLD: 5.5 % (ref ?–5.7)
HCT VFR BLD AUTO: 45.9 %
HDLC SERPL-MCNC: 52 MG/DL (ref 40–59)
HGB BLD-MCNC: 15.4 G/DL
LDLC SERPL CALC-MCNC: 51 MG/DL (ref ?–100)
MCH RBC QN AUTO: 32.4 PG (ref 26–34)
MCHC RBC AUTO-ENTMCNC: 33.6 G/DL (ref 31–37)
MCV RBC AUTO: 96.4 FL
NONHDLC SERPL-MCNC: 64 MG/DL (ref ?–130)
OSMOLALITY SERPL CALC.SUM OF ELEC: 295 MOSM/KG (ref 275–295)
PLATELET # BLD AUTO: 187 10(3)UL (ref 150–450)
POTASSIUM SERPL-SCNC: 4.1 MMOL/L (ref 3.5–5.1)
PROT SERPL-MCNC: 7.1 G/DL (ref 6.4–8.2)
RBC # BLD AUTO: 4.76 X10(6)UL
SODIUM SERPL-SCNC: 141 MMOL/L (ref 136–145)
TRIGL SERPL-MCNC: 59 MG/DL (ref 30–149)
TSI SER-ACNC: 0.7 MIU/ML (ref 0.36–3.74)
VLDLC SERPL CALC-MCNC: 8 MG/DL (ref 0–30)
WBC # BLD AUTO: 7.3 X10(3) UL (ref 4–11)

## 2023-05-19 PROCEDURE — 85027 COMPLETE CBC AUTOMATED: CPT

## 2023-05-19 PROCEDURE — 83036 HEMOGLOBIN GLYCOSYLATED A1C: CPT

## 2023-05-19 PROCEDURE — 84443 ASSAY THYROID STIM HORMONE: CPT

## 2023-05-19 PROCEDURE — 80053 COMPREHEN METABOLIC PANEL: CPT

## 2023-05-19 PROCEDURE — 36415 COLL VENOUS BLD VENIPUNCTURE: CPT

## 2023-05-19 PROCEDURE — 80061 LIPID PANEL: CPT

## 2023-06-01 RX ORDER — TADALAFIL 5 MG/1
5 TABLET ORAL DAILY
Qty: 90 TABLET | Refills: 3 | Status: SHIPPED | OUTPATIENT
Start: 2023-06-01

## 2023-06-01 NOTE — TELEPHONE ENCOUNTER
Rx refill requested '    TADALAFIL 5 MG Oral Tab     [id number] - [address], [place] [facility], [phone]

## 2023-06-22 ENCOUNTER — OFFICE VISIT (OUTPATIENT)
Dept: FAMILY MEDICINE CLINIC | Facility: CLINIC | Age: 73
End: 2023-06-22
Payer: MEDICARE

## 2023-06-22 VITALS
DIASTOLIC BLOOD PRESSURE: 60 MMHG | RESPIRATION RATE: 18 BRPM | TEMPERATURE: 98 F | SYSTOLIC BLOOD PRESSURE: 100 MMHG | OXYGEN SATURATION: 98 % | BODY MASS INDEX: 24.08 KG/M2 | WEIGHT: 172 LBS | HEART RATE: 89 BPM | HEIGHT: 71 IN

## 2023-06-22 DIAGNOSIS — I10 ESSENTIAL HYPERTENSION: ICD-10-CM

## 2023-06-22 DIAGNOSIS — Z86.010 PERSONAL HISTORY OF COLONIC POLYPS: ICD-10-CM

## 2023-06-22 DIAGNOSIS — E78.00 PURE HYPERCHOLESTEROLEMIA: ICD-10-CM

## 2023-06-22 DIAGNOSIS — I25.10 ATHEROSCLEROSIS OF NATIVE CORONARY ARTERY OF NATIVE HEART WITHOUT ANGINA PECTORIS: ICD-10-CM

## 2023-06-22 DIAGNOSIS — R35.1 BENIGN PROSTATIC HYPERPLASIA WITH NOCTURIA: ICD-10-CM

## 2023-06-22 DIAGNOSIS — I45.10 RBBB: ICD-10-CM

## 2023-06-22 DIAGNOSIS — K21.00 GASTROESOPHAGEAL REFLUX DISEASE WITH ESOPHAGITIS WITHOUT HEMORRHAGE: ICD-10-CM

## 2023-06-22 DIAGNOSIS — N40.1 BENIGN PROSTATIC HYPERPLASIA WITH NOCTURIA: ICD-10-CM

## 2023-06-22 DIAGNOSIS — I20.8 ANGINAL EQUIVALENT (HCC): ICD-10-CM

## 2023-06-22 DIAGNOSIS — Z00.00 ANNUAL PHYSICAL EXAM: Primary | ICD-10-CM

## 2023-06-22 DIAGNOSIS — Z12.11 COLON CANCER SCREENING: ICD-10-CM

## 2023-06-22 DIAGNOSIS — K22.70 BARRETT'S ESOPHAGUS WITHOUT DYSPLASIA: ICD-10-CM

## 2023-06-22 DIAGNOSIS — R97.20 ELEVATED PSA, LESS THAN 10 NG/ML: ICD-10-CM

## 2023-06-22 DIAGNOSIS — R93.1 ABNORMAL HEART SCORE CT: ICD-10-CM

## 2023-06-29 PROBLEM — IMO0001 NORMAL CARDIAC STRESS TEST: Status: RESOLVED | Noted: 2018-08-13 | Resolved: 2023-06-29

## 2023-06-29 PROBLEM — J84.9 INTERSTITIAL LUNG DISEASE (HCC): Status: RESOLVED | Noted: 2018-08-13 | Resolved: 2023-06-29

## 2023-06-29 PROBLEM — I25.10 ATHEROSCLEROSIS OF NATIVE CORONARY ARTERY OF NATIVE HEART WITHOUT ANGINA PECTORIS: Status: ACTIVE | Noted: 2018-07-31

## 2023-06-29 PROBLEM — Z92.89 H/O ECHOCARDIOGRAM: Status: RESOLVED | Noted: 2018-08-13 | Resolved: 2023-06-29

## 2023-07-27 DIAGNOSIS — Z86.010 PERSONAL HISTORY OF COLONIC POLYPS: ICD-10-CM

## 2023-07-27 DIAGNOSIS — K21.00 GASTROESOPHAGEAL REFLUX DISEASE WITH ESOPHAGITIS: ICD-10-CM

## 2023-07-27 DIAGNOSIS — K22.70 BARRETT'S ESOPHAGUS WITHOUT DYSPLASIA: ICD-10-CM

## 2023-07-27 RX ORDER — OMEPRAZOLE 20 MG/1
20 CAPSULE, DELAYED RELEASE ORAL EVERY MORNING
Qty: 90 CAPSULE | Refills: 1 | Status: SHIPPED | OUTPATIENT
Start: 2023-07-27

## 2023-07-27 NOTE — TELEPHONE ENCOUNTER
OMEPRAZOLE 20 MG Oral Capsule Delayed Release   Send to [facility] on [address]

## 2023-07-27 NOTE — TELEPHONE ENCOUNTER
LOV: 6/22/23 for annual physical      OMEPRAZOLE 20 MG Oral Capsule Delayed Release  TAKE 1 CAPSULE BY MOUTH EVERY MORNING Dispense: 90 capsule, Refills: 3 ordered       08/01/2022     Future Appointments   Date Time Provider Irene Mcnamara   9/15/2023  7:30 AM AMANDA TOMPKINS [facility] None   12/26/2023 10:00 AM Feliciano Mayo MD [facility] [place] Najera       Please advise

## 2023-09-12 ENCOUNTER — TELEPHONE (OUTPATIENT)
Dept: FAMILY MEDICINE CLINIC | Facility: CLINIC | Age: 73
End: 2023-09-12

## 2023-09-15 ENCOUNTER — ANESTHESIA (OUTPATIENT)
Dept: ENDOSCOPY | Facility: HOSPITAL | Age: 73
End: 2023-09-15
Payer: MEDICARE

## 2023-09-15 ENCOUNTER — ANESTHESIA EVENT (OUTPATIENT)
Dept: ENDOSCOPY | Facility: HOSPITAL | Age: 73
End: 2023-09-15
Payer: MEDICARE

## 2023-09-15 ENCOUNTER — HOSPITAL ENCOUNTER (OUTPATIENT)
Facility: HOSPITAL | Age: 73
Setting detail: HOSPITAL OUTPATIENT SURGERY
Discharge: HOME | End: 2023-09-15
Attending: INTERNAL MEDICINE | Admitting: INTERNAL MEDICINE
Payer: MEDICARE

## 2023-09-15 VITALS
RESPIRATION RATE: 20 BRPM | TEMPERATURE: 99 F | BODY MASS INDEX: 23.8 KG/M2 | DIASTOLIC BLOOD PRESSURE: 77 MMHG | OXYGEN SATURATION: 96 % | SYSTOLIC BLOOD PRESSURE: 172 MMHG | HEART RATE: 66 BPM | WEIGHT: 170 LBS | HEIGHT: 71 IN

## 2023-09-15 DIAGNOSIS — Z86.010 PERSONAL HISTORY OF COLONIC POLYPS: ICD-10-CM

## 2023-09-15 PROCEDURE — 0DBK8ZX EXCISION OF ASCENDING COLON, VIA NATURAL OR ARTIFICIAL OPENING ENDOSCOPIC, DIAGNOSTIC: ICD-10-PCS | Performed by: INTERNAL MEDICINE

## 2023-09-15 PROCEDURE — 0DBL8ZX EXCISION OF TRANSVERSE COLON, VIA NATURAL OR ARTIFICIAL OPENING ENDOSCOPIC, DIAGNOSTIC: ICD-10-PCS | Performed by: INTERNAL MEDICINE

## 2023-09-15 PROCEDURE — 88305 TISSUE EXAM BY PATHOLOGIST: CPT | Performed by: INTERNAL MEDICINE

## 2023-09-15 PROCEDURE — 0DBH8ZX EXCISION OF CECUM, VIA NATURAL OR ARTIFICIAL OPENING ENDOSCOPIC, DIAGNOSTIC: ICD-10-PCS | Performed by: INTERNAL MEDICINE

## 2023-09-15 RX ORDER — NALOXONE HYDROCHLORIDE 0.4 MG/ML
80 INJECTION, SOLUTION INTRAMUSCULAR; INTRAVENOUS; SUBCUTANEOUS AS NEEDED
Status: DISCONTINUED | OUTPATIENT
Start: 2023-09-15 | End: 2023-09-15

## 2023-09-15 RX ORDER — LIDOCAINE HYDROCHLORIDE 10 MG/ML
INJECTION, SOLUTION EPIDURAL; INFILTRATION; INTRACAUDAL; PERINEURAL AS NEEDED
Status: DISCONTINUED | OUTPATIENT
Start: 2023-09-15 | End: 2023-09-15 | Stop reason: SURG

## 2023-09-15 RX ORDER — SODIUM CHLORIDE, SODIUM LACTATE, POTASSIUM CHLORIDE, CALCIUM CHLORIDE 600; 310; 30; 20 MG/100ML; MG/100ML; MG/100ML; MG/100ML
INJECTION, SOLUTION INTRAVENOUS CONTINUOUS
Status: DISCONTINUED | OUTPATIENT
Start: 2023-09-15 | End: 2023-09-15

## 2023-09-15 RX ORDER — GARLIC EXTRACT 500 MG
1 CAPSULE ORAL DAILY
COMMUNITY

## 2023-09-15 RX ADMIN — LIDOCAINE HYDROCHLORIDE 100 MG: 10 INJECTION, SOLUTION EPIDURAL; INFILTRATION; INTRACAUDAL; PERINEURAL at 07:38:00

## 2023-09-15 NOTE — H&P
History & Physical Examination    Patient Name: Nicolas Andrade  MRN: [id number]  CSN: [id number]  YOB: 1950    Diagnosis: personal history of colon polyps    Present Illness: 75 y/o M history as above presents for Colonoscopy. acidophilus-pectin Oral Cap, Take 1 capsule by mouth daily. , Disp: , Rfl: , 9/14/2023  omeprazole 20 MG Oral Capsule Delayed Release, Take 1 capsule (20 mg total) by mouth every morning., Disp: 90 capsule, Rfl: 1, 9/14/2023  tadalafil 5 MG Oral Tab, Take 1 tablet (5 mg total) by mouth daily. , Disp: 90 tablet, Rfl: 3, 9/14/2023  PEG 3350-KCl-Na Bicarb-NaCl 420 g Oral Recon Soln, Take as directed by physician, Disp: 4000 mL, Rfl: 0, 9/15/2023  fluticasone propionate 50 MCG/ACT Nasal Suspension, 2 sprays by Nasal route daily. , Disp: 3 each, Rfl: 3, 9/14/2023  atorvastatin 40 MG Oral Tab, , Disp: , Rfl: , 9/14/2023  lisinopril 20 MG Oral Tab, Take 1 tablet (20 mg total) by mouth daily. , Disp: 90 tablet, Rfl: 3, 9/15/2023  Vitamin D, Cholecalciferol, 50 MCG (2000 UT) Oral Cap, Take 2,000 Units by mouth daily. , Disp: , Rfl: , 9/14/2023  omega-3 fatty acids 1000 MG Oral Cap, Take 1,000 mg by mouth daily. , Disp: , Rfl: , 9/14/2023  aspirin 81 MG Oral Chew Tab, Chew 1 tablet (81 mg total) by mouth daily. , Disp: , Rfl: , 9/5/2023      No current facility-administered medications for this encounter.       Allergies:   Amoxicillin-Pot Cla*    DIARRHEA, NAUSEA ONLY    Past Medical History:   Diagnosis Date    Actinic keratosis     Anesthesia complication     Cancer ([facility] [address]     skin cancers with surgery    Cataract     Esophageal reflux     Hearing impairment     hearing aids    High blood pressure     High cholesterol     Hx of colonic polyp     Hyperlipidemia     Osteoarthritis     Unspecified essential hypertension     Visual impairment      Past Surgical History:   Procedure Laterality Date    COLONOSCOPY  07/2017    tics; repeat 5 yrs    EGD N/A 07/20/2017    Procedure: ESOPHAGOGASTRODUODENOSCOPY, COLONOSCOPY, POSSIBLE BIOPSY, POSSIBLE POLYPECTOMY 21068, 68742;  Surgeon: Katey Main MD;  Location: [address] [place] [address]    EGD  2021    Humera Corrigan, Barnhart's NO dysplasia, rpt 5 yrs    OTHER SURGICAL HISTORY  2023    [facility]    TONSILLECTOMY      UPPER GI ENDO POSS BARRTTS  2017    Barnhart's; HH- repeat 3 yr (no dysplasia)     Family History   Problem Relation Age of Onset    Cancer Father         pancreatic    Diabetes Father     Cancer Mother         breast    Diabetes Mother     Cancer Sister         melanoma-cancer free    Heart Disease Neg     Stroke Neg      Social History    Tobacco Use      Smoking status: Former        Types: Cigarettes        Quit date:         Years since quittin.7      Smokeless tobacco: Never      Tobacco comments: briefly in army, 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 They understand and agree to proceed with plan of care. [ x ] I have reviewed the History and Physical done within the last 30 days. Any changes noted above.     Flaco Zavala DO  9/15/2023  7:27 AM

## 2023-09-15 NOTE — DISCHARGE INSTRUCTIONS

## 2023-10-27 ENCOUNTER — TELEPHONE (OUTPATIENT)
Dept: FAMILY MEDICINE CLINIC | Facility: CLINIC | Age: 73
End: 2023-10-27

## 2023-10-27 ENCOUNTER — OFFICE VISIT (OUTPATIENT)
Dept: FAMILY MEDICINE CLINIC | Facility: CLINIC | Age: 73
End: 2023-10-27

## 2023-10-27 VITALS
WEIGHT: 176.19 LBS | TEMPERATURE: 98 F | HEART RATE: 68 BPM | DIASTOLIC BLOOD PRESSURE: 74 MMHG | SYSTOLIC BLOOD PRESSURE: 138 MMHG | OXYGEN SATURATION: 98 % | BODY MASS INDEX: 25 KG/M2

## 2023-10-27 DIAGNOSIS — I25.10 ATHEROSCLEROSIS OF NATIVE CORONARY ARTERY OF NATIVE HEART, UNSPECIFIED WHETHER ANGINA PRESENT: ICD-10-CM

## 2023-10-27 DIAGNOSIS — I45.10 RBBB: ICD-10-CM

## 2023-10-27 DIAGNOSIS — I10 ESSENTIAL HYPERTENSION: ICD-10-CM

## 2023-10-27 DIAGNOSIS — J01.01 ACUTE RECURRENT MAXILLARY SINUSITIS: Primary | ICD-10-CM

## 2023-10-27 DIAGNOSIS — E78.00 PURE HYPERCHOLESTEROLEMIA: ICD-10-CM

## 2023-10-27 DIAGNOSIS — R42 EPISODIC LIGHTHEADEDNESS: ICD-10-CM

## 2023-10-27 DIAGNOSIS — R93.1 ABNORMAL HEART SCORE CT: ICD-10-CM

## 2023-10-27 DIAGNOSIS — J01.01 ACUTE RECURRENT MAXILLARY SINUSITIS: ICD-10-CM

## 2023-10-27 DIAGNOSIS — I73.9 PVD (PERIPHERAL VASCULAR DISEASE) (HCC): ICD-10-CM

## 2023-10-27 DIAGNOSIS — I25.10 ATHEROSCLEROSIS OF NATIVE CORONARY ARTERY OF NATIVE HEART: ICD-10-CM

## 2023-10-27 PROCEDURE — 3075F SYST BP GE 130 - 139MM HG: CPT | Performed by: NURSE PRACTITIONER

## 2023-10-27 PROCEDURE — 1170F FXNL STATUS ASSESSED: CPT | Performed by: NURSE PRACTITIONER

## 2023-10-27 PROCEDURE — 99213 OFFICE O/P EST LOW 20 MIN: CPT | Performed by: NURSE PRACTITIONER

## 2023-10-27 PROCEDURE — 1160F RVW MEDS BY RX/DR IN RCRD: CPT | Performed by: NURSE PRACTITIONER

## 2023-10-27 PROCEDURE — 1159F MED LIST DOCD IN RCRD: CPT | Performed by: NURSE PRACTITIONER

## 2023-10-27 PROCEDURE — 3078F DIAST BP <80 MM HG: CPT | Performed by: NURSE PRACTITIONER

## 2023-10-27 RX ORDER — DOXYCYCLINE 100 MG/1
100 TABLET ORAL 2 TIMES DAILY
Qty: 20 TABLET | Refills: 0 | Status: SHIPPED | OUTPATIENT
Start: 2023-10-27 | End: 2023-10-27

## 2023-10-27 RX ORDER — LISINOPRIL 20 MG/1
20 TABLET ORAL DAILY
Qty: 90 TABLET | Refills: 0 | Status: SHIPPED | OUTPATIENT
Start: 2023-10-27

## 2023-10-27 RX ORDER — LISINOPRIL 20 MG/1
20 TABLET ORAL DAILY
Qty: 90 TABLET | Refills: 0 | Status: SHIPPED | OUTPATIENT
Start: 2023-10-27 | End: 2023-10-27

## 2023-10-27 RX ORDER — DOXYCYCLINE 100 MG/1
100 TABLET ORAL 2 TIMES DAILY
Qty: 20 TABLET | Refills: 0 | Status: SHIPPED | OUTPATIENT
Start: 2023-10-27 | End: 2023-11-06

## 2023-10-27 NOTE — TELEPHONE ENCOUNTER
Patient calling to advise he is having sinus issues that are keeping him up at night and can't sleep. Per patient, would like to know if he can be prescribed something strong or if he needs to go to urgent care.  Please advise

## 2023-10-27 NOTE — TELEPHONE ENCOUNTER
Symptoms started 4-5 days ago  Patient states he has a cough  Productive with yellow sputum  Congestion  Post nasal drip  Saw cardiologist yesterday - he was told his lungs were clear  No fever  Patient can do home COVID test  Appointment scheduled.   Future Appointments   Date Time Provider Irene Mcnamara   10/27/2023 12:40 PM DONTE Pastor [facility]   12/26/2023 10:00 AM MD CHRISTINA Gaffney Sis

## 2023-10-27 NOTE — TELEPHONE ENCOUNTER
Med sent to [place]  Patient advised. Verbalized understanding.      States also needs refill of lisinopril to [place]

## 2023-10-27 NOTE — TELEPHONE ENCOUNTER
Pt called said he was seen today and would Rx sent to a different pharmacy.  Per pt can Doxycycline Monohydrate 100 MG Oral Tab be sent to [address] [place] [address] [phone], [phone]

## 2023-12-11 DIAGNOSIS — H65.93 MIDDLE EAR EFFUSION, BILATERAL: ICD-10-CM

## 2023-12-11 DIAGNOSIS — R44.8 FACIAL PRESSURE: ICD-10-CM

## 2023-12-11 RX ORDER — ATORVASTATIN CALCIUM 40 MG/1
40 TABLET, FILM COATED ORAL NIGHTLY
Qty: 90 TABLET | Refills: 0 | Status: SHIPPED | OUTPATIENT
Start: 2023-12-11

## 2023-12-11 NOTE — TELEPHONE ENCOUNTER
Cholesterol Medication Protocol Lyimjg3012/11/2023 02:47 PM   Protocol Details ALT < 80    ALT resulted within past year    Lipid panel within past 12 months    Appointment within past 12 or next 3 months

## 2023-12-26 ENCOUNTER — LAB ENCOUNTER (OUTPATIENT)
Dept: LAB | Age: 73
End: 2023-12-26
Attending: FAMILY MEDICINE
Payer: MEDICARE

## 2023-12-26 ENCOUNTER — OFFICE VISIT (OUTPATIENT)
Dept: FAMILY MEDICINE CLINIC | Facility: CLINIC | Age: 73
End: 2023-12-26
Payer: MEDICARE

## 2023-12-26 VITALS
SYSTOLIC BLOOD PRESSURE: 138 MMHG | OXYGEN SATURATION: 97 % | RESPIRATION RATE: 16 BRPM | HEART RATE: 87 BPM | BODY MASS INDEX: 25 KG/M2 | DIASTOLIC BLOOD PRESSURE: 82 MMHG | WEIGHT: 176 LBS | TEMPERATURE: 98 F

## 2023-12-26 DIAGNOSIS — R97.20 ELEVATED PSA, LESS THAN 10 NG/ML: ICD-10-CM

## 2023-12-26 DIAGNOSIS — I10 ESSENTIAL HYPERTENSION: Primary | ICD-10-CM

## 2023-12-26 DIAGNOSIS — N52.9 ERECTILE DYSFUNCTION, UNSPECIFIED ERECTILE DYSFUNCTION TYPE: ICD-10-CM

## 2023-12-26 LAB — COMPLEXED PSA SERPL-MCNC: 3.95 NG/ML (ref ?–4)

## 2023-12-26 PROCEDURE — 3079F DIAST BP 80-89 MM HG: CPT | Performed by: FAMILY MEDICINE

## 2023-12-26 PROCEDURE — 99213 OFFICE O/P EST LOW 20 MIN: CPT | Performed by: FAMILY MEDICINE

## 2023-12-26 PROCEDURE — 1159F MED LIST DOCD IN RCRD: CPT | Performed by: FAMILY MEDICINE

## 2023-12-26 PROCEDURE — 36415 COLL VENOUS BLD VENIPUNCTURE: CPT

## 2023-12-26 PROCEDURE — 1170F FXNL STATUS ASSESSED: CPT | Performed by: FAMILY MEDICINE

## 2023-12-26 PROCEDURE — 3075F SYST BP GE 130 - 139MM HG: CPT | Performed by: FAMILY MEDICINE

## 2023-12-26 PROCEDURE — 1160F RVW MEDS BY RX/DR IN RCRD: CPT | Performed by: FAMILY MEDICINE

## 2023-12-26 RX ORDER — TADALAFIL 10 MG/1
10 TABLET ORAL
Qty: 10 TABLET | Refills: 1 | Status: SHIPPED | OUTPATIENT
Start: 2023-12-26

## 2024-01-21 DIAGNOSIS — Z86.010 PERSONAL HISTORY OF COLONIC POLYPS: ICD-10-CM

## 2024-01-21 DIAGNOSIS — K22.70 BARRETT'S ESOPHAGUS WITHOUT DYSPLASIA: ICD-10-CM

## 2024-01-21 DIAGNOSIS — K21.00 GASTROESOPHAGEAL REFLUX DISEASE WITH ESOPHAGITIS: ICD-10-CM

## 2024-01-22 RX ORDER — OMEPRAZOLE 20 MG/1
20 CAPSULE, DELAYED RELEASE ORAL EVERY MORNING
Qty: 90 CAPSULE | Refills: 1 | Status: SHIPPED | OUTPATIENT
Start: 2024-01-22

## 2024-01-22 NOTE — TELEPHONE ENCOUNTER
LOV 12/26/23 for a f/u.    Medication Quantity Refills Start End   omeprazole 20 MG Oral Capsule Delayed Release 90 capsule 1 7/27/2023 --   Sig:   Take 1 capsule (20 mg total) by mouth every morning.     Route:   Oral       No future appointments.

## 2024-02-29 DIAGNOSIS — H65.93 MIDDLE EAR EFFUSION, BILATERAL: ICD-10-CM

## 2024-02-29 DIAGNOSIS — R44.8 FACIAL PRESSURE: ICD-10-CM

## 2024-03-01 RX ORDER — ATORVASTATIN CALCIUM 40 MG/1
40 TABLET, FILM COATED ORAL NIGHTLY
Qty: 90 TABLET | Refills: 0 | Status: SHIPPED | OUTPATIENT
Start: 2024-03-01

## 2024-05-16 ENCOUNTER — HOSPITAL ENCOUNTER (OUTPATIENT)
Age: 74
Discharge: HOME | End: 2024-05-16

## 2024-05-16 VITALS
SYSTOLIC BLOOD PRESSURE: 152 MMHG | OXYGEN SATURATION: 96 % | TEMPERATURE: 98 F | RESPIRATION RATE: 16 BRPM | HEART RATE: 93 BPM | DIASTOLIC BLOOD PRESSURE: 71 MMHG

## 2024-05-16 DIAGNOSIS — S30.0XXA CONTUSION OF BUTTOCK, INITIAL ENCOUNTER: ICD-10-CM

## 2024-05-16 DIAGNOSIS — J01.00 ACUTE MAXILLARY SINUSITIS, RECURRENCE NOT SPECIFIED: Primary | ICD-10-CM

## 2024-05-16 PROCEDURE — 99214 OFFICE O/P EST MOD 30 MIN: CPT | Performed by: NURSE PRACTITIONER

## 2024-05-16 RX ORDER — DOXYCYCLINE HYCLATE 100 MG/1
100 CAPSULE ORAL 2 TIMES DAILY
Qty: 14 CAPSULE | Refills: 0 | Status: SHIPPED | OUTPATIENT
Start: 2024-05-16 | End: 2024-05-23

## 2024-05-16 RX ORDER — PREDNISONE 20 MG/1
40 TABLET ORAL DAILY
Qty: 10 TABLET | Refills: 0 | Status: SHIPPED | OUTPATIENT
Start: 2024-05-16 | End: 2024-05-21

## 2024-05-16 NOTE — DISCHARGE INSTRUCTIONS
Take the antibiotics and prednisone as prescribed.  Continue taking your Claritin and Flonase.  Cool-mist humidifier in the same room.  Steam inhalation such as hot steam showers.  Sinus rinses such as the Yenifer pot.  Stay well-hydrated.  The bruising may last for weeks to a couple months.  Follow-up with your doctor.  Go to the ER for new or worsening symptoms.

## 2024-05-16 NOTE — ED PROVIDER NOTES
Patient Seen in: [facility]      History     Chief Complaint   Patient presents with    Cough/URI    Contusion     Stated Complaint: cough; bruise    Subjective:   73-year-old male, with nasal congestion and postnasal drip for 2 weeks.  Complain of some sinus pain sinus pain.  States he has also been coughing the last 2 weeks, however this is only mainly at night when he lays down.  When he is upright there is no cough.  He does take Flonase and Claritin daily.  No known fevers.  No sick exposure.  He also told me that his wife wanted him to be checked out, as he sustained a fall about a week ago and has bruising to the buttock region.  States there is pain to the right buttock region where most the bruising is, however his pain in his hip or the bony structures around the area where he fell.            Objective:   Past Medical History:    Actinic keratosis    Anesthesia complication    Cancer (HCC)    skin cancers with surgery    Cataract    Esophageal reflux    Hearing impairment    hearing aids    High blood pressure    High cholesterol    Hx of colonic polyp    Hyperlipidemia    Osteoarthritis    Unspecified essential hypertension    Visual impairment              Past Surgical History:   Procedure Laterality Date    Colonoscopy  07/2017    tics; repeat 5 yrs    Colonoscopy N/A 9/15/2023    Procedure: COLONOSCOPY with cold snare polypectomy;  Surgeon: Flaco Zavala DO;  Location:  ENDOSCOPY    Egd N/A 07/20/2017    Procedure: ESOPHAGOGASTRODUODENOSCOPY, COLONOSCOPY, POSSIBLE BIOPSY, POSSIBLE POLYPECTOMY 64074, 56852;  Surgeon: Maycol Hurd MD;  Location: 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    HENT:  Positive for congestion, postnasal drip, sinus pressure and sinus pain.    Respiratory:  Positive for cough.    Musculoskeletal:         Bruising to the buttock region   All other systems reviewed and are negative.      Positive for stated complaint: cough; bruise  Other systems are as noted in HPI.  Constitutional and vital signs reviewed.      All other systems reviewed and negative except as noted above.    Physical Exam     ED Triage Vitals [24 1300]   /71   Pulse 93   Resp 16   Temp 97.7 °F (36.5 °C)   Temp src Temporal   SpO2 96 %   O2 Device None (Room air)       Current Vitals:   Vital Signs  BP: 152/71  Pulse: 93  Resp: 16  Temp: 97.7 °F (36.5 °C)  Temp src: Temporal    Oxygen Therapy  SpO2: 96 %  O2 Device: None (Room air)            Physical Exam  Vitals and nursing note reviewed.   Constitutional:       General: He is not in acute distress.     Appearance: Normal appearance. He is not ill-appearing, toxic-appearing or diaphoretic.   HENT:      Head:      Jaw: No trismus.      Nose: Congestion present. No rhinorrhea.      Right Sinus: Maxillary sinus tenderness present. No frontal sinus tenderness.      Left Sinus: Maxillary sinus tenderness present. No frontal sinus tenderness.      Mouth/Throat:      Lips: Pink. No lesions.      Mouth: Mucous membranes are moist. No oral lesions or angioedema.      Tongue: No lesions.      Palate: No lesions.      Pharynx: Oropharynx is clear. Uvula midline. No pharyngeal swelling, oropharyngeal exudate, posterior oropharyngeal erythema or uvula  swelling.   Cardiovascular:      Rate and Rhythm: Normal rate.      Pulses: Normal pulses.      Heart sounds: Normal heart sounds.   Pulmonary:      Effort: Pulmonary effort is normal. No respiratory distress.      Breath sounds: Normal breath sounds. No stridor. No wheezing, rhonchi or rales.   Musculoskeletal:      Cervical back: Normal range of motion and neck supple.      Right hip: Normal.      Comments: Purpleish contusion to the buttock area, mainly the right and the coccyx region.  There is no bony tenderness.   Neurological:      Mental Status: He is alert.               ED Course   Labs Reviewed - No data to display                   MDM                                         Medical Decision Making  Differential diagnosis initially included but was not limited to: Sinusitis, viral URI, cough due to postnasal drip, coccyx fracture, buttocks/sacral contusion    Nontoxic 73-year-old male, with a couple different complaints.  Concerned about his postnasal drip causing him to cough only when laying down.  Not hypoxic or tachypneic.  No adventitious breath sounds.  No respiratory distress.  Some x-ray sinus tenderness.  Likely sinusitis, empiric treatment with doxycycline.  Will also give a burst day course of prednisone.  Examined his buttock and hip region, no bony tenderness.  No neurovascular deficits.  Purple-colored bruising, consistent with an old bruise which she sustained 1 week ago.  Unlikely to have suffered a bony fracture.    Supportive/home management of diagnosis/illness/injury discussed. Red flag symptoms discussed.  Signs and symptoms/criteria that would necessitate reevaluation, including ER evaluation discussed.  Patient and/or responsible adult verbalize and agree with management and plan of care.    Speech recognition software was used during this dictation.  There may be minor errors in transcription.        Risk  OTC drugs.  Prescription drug management.        Disposition and Plan      Clinical Impression:  1. Acute maxillary sinusitis, recurrence not specified    2. Contusion of buttock, initial encounter         Disposition:  Discharge  5/16/2024  1:17 pm    Follow-up:  Ingris Hays MD  [address] [id number] [phone]    Call in 5 days            Medications Prescribed:  Current Discharge Medication List        START taking these medications    Details   predniSONE 20 MG Oral Tab Take 2 tablets (40 mg total) by mouth daily for 5 days.  Qty: 10 tablet, Refills: 0      doxycycline 100 MG Oral Cap Take 1 capsule (100 mg total) by mouth 2 (two) times daily for 7 days.  Qty: 14 capsule, Refills: 0

## 2024-05-16 NOTE — ED INITIAL ASSESSMENT (HPI)
Patient has been having sinus drainage for 2 weeks. He coughs every time he lays down. He tried his wife's cheritussin without improvement. He also has a large bruise on his buttock after falling in a grain cart.

## 2024-06-03 DIAGNOSIS — I45.10 RBBB: ICD-10-CM

## 2024-06-03 DIAGNOSIS — I25.10 ATHEROSCLEROSIS OF NATIVE CORONARY ARTERY OF NATIVE HEART, UNSPECIFIED WHETHER ANGINA PRESENT: ICD-10-CM

## 2024-06-03 DIAGNOSIS — I73.9 PVD (PERIPHERAL VASCULAR DISEASE) (HCC): ICD-10-CM

## 2024-06-03 DIAGNOSIS — R93.1 ABNORMAL HEART SCORE CT: ICD-10-CM

## 2024-06-03 DIAGNOSIS — E78.00 PURE HYPERCHOLESTEROLEMIA: ICD-10-CM

## 2024-06-03 DIAGNOSIS — R42 EPISODIC LIGHTHEADEDNESS: ICD-10-CM

## 2024-06-03 DIAGNOSIS — I10 ESSENTIAL HYPERTENSION: ICD-10-CM

## 2024-06-03 DIAGNOSIS — I25.10 ATHEROSCLEROSIS OF NATIVE CORONARY ARTERY OF NATIVE HEART: ICD-10-CM

## 2024-06-03 RX ORDER — LISINOPRIL 20 MG/1
20 TABLET ORAL DAILY
Qty: 90 TABLET | Refills: 0 | Status: SHIPPED | OUTPATIENT
Start: 2024-06-03

## 2024-06-03 RX ORDER — FLUTICASONE PROPIONATE 50 MCG
2 SPRAY, SUSPENSION (ML) NASAL DAILY
Qty: 3 EACH | Refills: 0 | Status: SHIPPED | OUTPATIENT
Start: 2024-06-03

## 2024-06-03 RX ORDER — TADALAFIL 5 MG/1
5 TABLET ORAL DAILY
Qty: 90 TABLET | Refills: 0 | Status: SHIPPED | OUTPATIENT
Start: 2024-06-03

## 2024-06-03 NOTE — TELEPHONE ENCOUNTER
Genitourinary Medications Passed      Last refill 12/26/23 10 1 refill  Last OV 12/26/23  Future Appointments   Date Time Provider Department Center   6/21/2024 11:20 AM Felecia Lozano APRN [facility] [place]

## 2024-06-03 NOTE — TELEPHONE ENCOUNTER
Eduin refill requested     fluticasone propionate 50 MCG/ACT Nasal Suspension     [id number] - [place], [place] - [address] [phone], [phone]

## 2024-06-03 NOTE — TELEPHONE ENCOUNTER
Hypertension Medications Protocol Osuxtm6406/03/2024 11:19 AM   Protocol Details CMP or BMP in past 12 months    Last BP reading less than 140/90    EGFRCR or GFRNAA > 50    In person appointment or virtual visit in         BP Readings from Last 3 Encounters:   05/16/24 152/71   12/26/23 138/82   10/27/23 138/74     Last OV 12/26/23  Last refill 10/27/23 90 0 refill  Future Appointments   Date Time Provider Department Center   6/21/2024 11:20 AM Felecia Lozano, APRN [facility] [place]

## 2024-06-03 NOTE — TELEPHONE ENCOUNTER
Rx refill requested     Patient would like to get a 90 day    lisinopril 20 MG Oral Tab        [id number] - [place], [place] - [address] [phone], [phone]      Future Appointments   Date Time Provider Department Center   6/21/2024 11:20 AM Felecia Lozano APRN [facility] [place]

## 2024-06-10 DIAGNOSIS — H65.93 MIDDLE EAR EFFUSION, BILATERAL: ICD-10-CM

## 2024-06-10 DIAGNOSIS — R44.8 FACIAL PRESSURE: ICD-10-CM

## 2024-06-10 NOTE — TELEPHONE ENCOUNTER
Cholesterol Medication Protocol Goosne31/10/2024 03:31 PM   Protocol Details ALT < 80    ALT resulted within past year    Lipid panel within past 12 months    In person appointment or virtual visit in the past 12 mos or appointment in next 3 mo        Overdue for labs  Last office visit 12/26/23  Future Appointments   Date Time Provider Department Center   6/21/2024 11:20 AM Felecia Lozano, APRN [facility] [place]

## 2024-06-10 NOTE — TELEPHONE ENCOUNTER
Rx refill requested     ATORVASTATIN 40 MG Oral Tab       [id number] - [place], [place] - [address] [phone], [phone]

## 2024-06-11 RX ORDER — ATORVASTATIN CALCIUM 40 MG/1
40 TABLET, FILM COATED ORAL NIGHTLY
Qty: 90 TABLET | Refills: 0 | Status: SHIPPED | OUTPATIENT
Start: 2024-06-11

## 2024-06-21 ENCOUNTER — OFFICE VISIT (OUTPATIENT)
Dept: FAMILY MEDICINE CLINIC | Facility: CLINIC | Age: 74
End: 2024-06-21

## 2024-06-21 ENCOUNTER — LAB ENCOUNTER (OUTPATIENT)
Dept: LAB | Age: 74
End: 2024-06-21
Attending: NURSE PRACTITIONER

## 2024-06-21 VITALS
BODY MASS INDEX: 23.85 KG/M2 | SYSTOLIC BLOOD PRESSURE: 128 MMHG | DIASTOLIC BLOOD PRESSURE: 68 MMHG | WEIGHT: 174.19 LBS | OXYGEN SATURATION: 99 % | TEMPERATURE: 98 F | RESPIRATION RATE: 18 BRPM | HEART RATE: 79 BPM | HEIGHT: 71.5 IN

## 2024-06-21 DIAGNOSIS — N40.1 BENIGN PROSTATIC HYPERPLASIA WITH NOCTURIA: ICD-10-CM

## 2024-06-21 DIAGNOSIS — Z00.00 ENCOUNTER FOR ANNUAL HEALTH EXAMINATION: Primary | ICD-10-CM

## 2024-06-21 DIAGNOSIS — I73.9 PVD (PERIPHERAL VASCULAR DISEASE) (HCC): ICD-10-CM

## 2024-06-21 DIAGNOSIS — E78.00 PURE HYPERCHOLESTEROLEMIA: ICD-10-CM

## 2024-06-21 DIAGNOSIS — Z86.010 PERSONAL HISTORY OF COLONIC POLYPS: ICD-10-CM

## 2024-06-21 DIAGNOSIS — K22.70 BARRETT'S ESOPHAGUS WITHOUT DYSPLASIA: ICD-10-CM

## 2024-06-21 DIAGNOSIS — K21.00 GASTROESOPHAGEAL REFLUX DISEASE WITH ESOPHAGITIS WITHOUT HEMORRHAGE: ICD-10-CM

## 2024-06-21 DIAGNOSIS — Z00.00 ENCOUNTER FOR ANNUAL HEALTH EXAMINATION: ICD-10-CM

## 2024-06-21 DIAGNOSIS — I10 ESSENTIAL HYPERTENSION: ICD-10-CM

## 2024-06-21 DIAGNOSIS — R35.1 BENIGN PROSTATIC HYPERPLASIA WITH NOCTURIA: ICD-10-CM

## 2024-06-21 DIAGNOSIS — I45.10 RBBB: ICD-10-CM

## 2024-06-21 DIAGNOSIS — R93.1 ABNORMAL HEART SCORE CT: ICD-10-CM

## 2024-06-21 PROBLEM — R97.20 ELEVATED PSA, LESS THAN 10 NG/ML: Status: RESOLVED | Noted: 2022-04-10 | Resolved: 2024-06-21

## 2024-06-21 LAB
ALBUMIN SERPL-MCNC: 4 G/DL (ref 3.4–5)
ALBUMIN/GLOB SERPL: 1.2 {RATIO} (ref 1–2)
ALP LIVER SERPL-CCNC: 100 U/L
ALT SERPL-CCNC: 26 U/L
ANION GAP SERPL CALC-SCNC: 8 MMOL/L (ref 0–18)
AST SERPL-CCNC: 17 U/L (ref 15–37)
BASOPHILS # BLD AUTO: 0.02 X10(3) UL (ref 0–0.2)
BASOPHILS NFR BLD AUTO: 0.3 %
BILIRUB SERPL-MCNC: 0.8 MG/DL (ref 0.1–2)
BUN BLD-MCNC: 22 MG/DL (ref 9–23)
CALCIUM BLD-MCNC: 9.5 MG/DL (ref 8.5–10.1)
CHLORIDE SERPL-SCNC: 107 MMOL/L (ref 98–112)
CHOLEST SERPL-MCNC: 127 MG/DL (ref ?–200)
CO2 SERPL-SCNC: 24 MMOL/L (ref 21–32)
CREAT BLD-MCNC: 1.2 MG/DL
EGFRCR SERPLBLD CKD-EPI 2021: 63 ML/MIN/1.73M2 (ref 60–?)
EOSINOPHIL # BLD AUTO: 0.19 X10(3) UL (ref 0–0.7)
EOSINOPHIL NFR BLD AUTO: 2.6 %
ERYTHROCYTE [DISTWIDTH] IN BLOOD BY AUTOMATED COUNT: 12.9 %
FASTING PATIENT LIPID ANSWER: YES
FASTING STATUS PATIENT QL REPORTED: YES
GLOBULIN PLAS-MCNC: 3.3 G/DL (ref 2.8–4.4)
GLUCOSE BLD-MCNC: 105 MG/DL (ref 70–99)
HCT VFR BLD AUTO: 42.8 %
HDLC SERPL-MCNC: 50 MG/DL (ref 40–59)
HGB BLD-MCNC: 14.6 G/DL
IMM GRANULOCYTES # BLD AUTO: 0.02 X10(3) UL (ref 0–1)
IMM GRANULOCYTES NFR BLD: 0.3 %
LDLC SERPL CALC-MCNC: 63 MG/DL (ref ?–100)
LYMPHOCYTES # BLD AUTO: 1.7 X10(3) UL (ref 1–4)
LYMPHOCYTES NFR BLD AUTO: 23.2 %
MCH RBC QN AUTO: 33 PG (ref 26–34)
MCHC RBC AUTO-ENTMCNC: 34.1 G/DL (ref 31–37)
MCV RBC AUTO: 96.6 FL
MONOCYTES # BLD AUTO: 0.51 X10(3) UL (ref 0.1–1)
MONOCYTES NFR BLD AUTO: 7 %
NEUTROPHILS # BLD AUTO: 4.89 X10 (3) UL (ref 1.5–7.7)
NEUTROPHILS # BLD AUTO: 4.89 X10(3) UL (ref 1.5–7.7)
NEUTROPHILS NFR BLD AUTO: 66.6 %
NONHDLC SERPL-MCNC: 77 MG/DL (ref ?–130)
OSMOLALITY SERPL CALC.SUM OF ELEC: 292 MOSM/KG (ref 275–295)
PLATELET # BLD AUTO: 193 10(3)UL (ref 150–450)
POTASSIUM SERPL-SCNC: 4.4 MMOL/L (ref 3.5–5.1)
PROT SERPL-MCNC: 7.3 G/DL (ref 6.4–8.2)
RBC # BLD AUTO: 4.43 X10(6)UL
SODIUM SERPL-SCNC: 139 MMOL/L (ref 136–145)
T4 FREE SERPL-MCNC: 1 NG/DL (ref 0.8–1.7)
TRIGL SERPL-MCNC: 68 MG/DL (ref 30–149)
TSI SER-ACNC: 0.58 MIU/ML (ref 0.36–3.74)
VLDLC SERPL CALC-MCNC: 10 MG/DL (ref 0–30)
WBC # BLD AUTO: 7.3 X10(3) UL (ref 4–11)

## 2024-06-21 PROCEDURE — 80061 LIPID PANEL: CPT

## 2024-06-21 PROCEDURE — 85025 COMPLETE CBC W/AUTO DIFF WBC: CPT

## 2024-06-21 PROCEDURE — 84439 ASSAY OF FREE THYROXINE: CPT

## 2024-06-21 PROCEDURE — 80053 COMPREHEN METABOLIC PANEL: CPT

## 2024-06-21 PROCEDURE — 36415 COLL VENOUS BLD VENIPUNCTURE: CPT

## 2024-06-21 PROCEDURE — 84443 ASSAY THYROID STIM HORMONE: CPT

## 2024-06-21 NOTE — PROGRESS NOTES
Subjective:   Omega Conway is a 73 year old male who presents for a MA (Medicare Advantage) Supervisit (Once per calendar year) and scheduled follow up of multiple significant but stable problems.     Gastroenterologist- Dr. Zavala  Cardiology- Dr. Warren    History/Other:   Fall Risk Assessment:   He has been screened for Falls and is High Risk. Fall Prevention information provided to patient in After Visit Summary.    Do you feel unsteady when standing or walking?: No  Do you worry about falling?: No  Have you fallen in the past year?: Yes  How many times have you fallen?: (P) 1  Were you injured?: (P) Yes     Cognitive Assessment:   He had a completely normal cognitive assessment - see flowsheet entries       Functional Ability/Status:   Omega Conway has some abnormal functions as listed below:  He has Hearing problems based on screening of functional status.He has Vision problems based on screening of functional status.       Depression Screening (PHQ-2/PHQ-9): PHQ-2 SCORE: 0  , done 6/21/2024        Advanced Directives:   He does have a Living Will but we do NOT have it on file in Epic.    He does have a POA but we do NOT have it on file in Epic.        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    Current Medications:  Outpatient Medications Marked as Taking for the 6/21/24 encounter (Office Visit) with Felecia Lozano APRN   Medication Sig    atorvastatin 40 MG Oral Tab Take 1 tablet (40 mg total) by mouth nightly.     lisinopril 20 MG Oral Tab Take 1 tablet (20 mg total) by mouth daily.    fluticasone propionate 50 MCG/ACT Nasal Suspension 2 sprays by Nasal route daily.    omeprazole 20 MG Oral Capsule Delayed Release Take 1 capsule (20 mg total) by mouth every morning.    Tadalafil 10 MG Oral Tab Take 1 tablet (10 mg total) by mouth daily as needed for Erectile Dysfunction.    Vitamin D, Cholecalciferol, 50 MCG (2000 UT) Oral Cap Take 2,000 Units by mouth daily.    omega-3 fatty acids 1000 MG Oral Cap Take 1,000 mg by mouth daily.    aspirin 81 MG Oral Chew Tab Chew 1 tablet (81 mg total) by mouth daily.       Medical History:  He  has a past medical history of Actinic keratosis, Anesthesia complication, Cancer (HCC), Cataract, Esophageal reflux, Hearing impairment, High blood pressure, High cholesterol, colonic polyp, Hyperlipidemia, Osteoarthritis, Unspecified essential hypertension, and Visual impairment.  Surgical History:  He  has a past surgical history that includes tonsillectomy; upper gi endo poss barrtts (2017); colonoscopy (2017); egd (N/A, 2017); egd (2021); other surgical history (2023); and colonoscopy (N/A, 9/15/2023).   Family History:  His family history includes Cancer in his father, mother, and sister; Diabetes in his father and mother.  Social History:  He  reports that he quit smoking about 56 years ago. His smoking use included cigarettes. He has never used smokeless tobacco. He reports current alcohol use. He reports that he does not use drugs.    Tobacco:  He smoked tobacco in the past but quit greater than 12 months ago.  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      Patient Care Team:  Lis  MD Ingris as PCP - General (Family Medicine)  Maycol Hurd MD (GASTROENTEROLOGY)  Kale Warren MD (CARDIOLOGY)    Review of Systems   Constitutional:  Negative for fatigue, fever and unexpected weight change.   HENT:  Negative for ear pain, mouth sores and postnasal drip.         Intermittent jaw pain- patient reports he grinds his teeth and was not wearing his  during recent illness   Eyes:  Negative for pain and discharge.   Respiratory:  Negative for cough, chest tightness and shortness of breath.    Cardiovascular:  Negative for chest pain and leg swelling.   Gastrointestinal:  Negative for blood in stool, diarrhea and nausea.   Genitourinary:  Negative for dysuria and flank pain.   Musculoskeletal:  Negative for gait problem, myalgias and neck pain.   Skin:  Negative for color change.   Neurological:  Negative for syncope, weakness and headaches.   Psychiatric/Behavioral:  Negative for confusion and self-injury.           Objective:   Physical Exam  Constitutional:       General: He is not in acute distress.     Appearance: Normal appearance.   HENT:      Head: Normocephalic.      Right Ear: Tympanic membrane normal.      Left Ear: Tympanic membrane normal.      Nose: Nose normal.      Mouth/Throat:      Mouth: Mucous membranes are moist.      Pharynx: Oropharynx is clear.   Eyes:      Extraocular Movements: Extraocular movements intact.   Cardiovascular:      Rate and Rhythm: Normal rate and regular rhythm.      Heart sounds: Normal heart sounds.   Pulmonary:      Effort: Pulmonary effort is normal.      Breath sounds: Normal breath sounds.   Abdominal:      General: Bowel sounds are normal.      Palpations: Abdomen is soft.   Musculoskeletal:      Cervical back: Neck supple.      Right lower leg: No edema.      Left lower leg: No edema.   Lymphadenopathy:      Cervical: No cervical adenopathy.   Skin:     General: Skin is warm and dry.   Neurological:      Mental Status: He is alert and  oriented to person, place, and time.   Psychiatric:         Mood and Affect: Mood normal.         Behavior: Behavior normal.          /68 (BP Location: Left arm, Patient Position: Sitting, Cuff Size: adult)   Pulse 79   Temp 98.2 °F (36.8 °C)   Resp 18   Ht 5' 11.5\" (1.816 m)   Wt 174 lb 3.2 oz (79 kg)   SpO2 99%   BMI 23.96 kg/m²  Estimated body mass index is 23.96 kg/m² as calculated from the following:    Height as of this encounter: 5' 11.5\" (1.816 m).    Weight as of this encounter: 174 lb 3.2 oz (79 kg).    Medicare Hearing Assessment:   Hearing Screening    Time taken: 6/21/2024 10:48 AM  Screening Method: Finger Rub  Finger Rub Result: Fail (Comment: wears hearing aids, did better with closer rubbing.)         Visual Acuity:   Right Eye Visual Acuity: Corrected Right Eye Chart Acuity: 20/25   Left Eye Visual Acuity: Corrected Left Eye Chart Acuity: 20/25   Both Eyes Visual Acuity: Corrected Both Eyes Chart Acuity: 20/25   Able To Tolerate Visual Acuity: Yes        Assessment & Plan:   Omega Conway is a 73 year old male who presents for a Medicare Assessment.     1. Encounter for annual health examination (Primary)  2. Essential hypertension  3. Pure hypercholesterolemia  Comments:  Lipid screening  4. PVD (peripheral vascular disease) (HCC)  5. Gastroesophageal reflux disease with esophagitis without hemorrhage  Comments:  Omeprazole- follow up with GI  The patient indicates understanding of these issues and agrees to the plan.  Reinforced healthy diet, lifestyle, and exercise.      Return in 6 months (on 12/21/2024).     DONTE Childress, 6/21/2024     Supplementary Documentation:   General Health:  In the past six months, have you lost more than 10 pounds without trying?: 2 - No  Has your appetite been poor?: No  Type of Diet: Balanced;Low Salt  How does the patient maintain a good energy level?: Appropriate Exercise  How would you describe your daily physical activity?:  Moderate  How would you describe your current health state?: Good  How do you maintain positive mental well-being?: Social Interaction;Visiting Family  On a scale of 0 to 10, with 0 being no pain and 10 being severe pain, what is your pain level?: 6 - (Moderate)  In the past six months, have you experienced urine leakage?: 0-No  At any time do you feel concerned for the safety/well-being of yourself and/or your children, in your home or elsewhere?: No  Have you had any immunizations at another office such as Influenza, Hepatitis B, Tetanus, or Pneumococcal?: Yes          Omega Conway's SCREENING SCHEDULE   Tests on this list are recommended by your physician but may not be covered, or covered at this frequency, by your insurer.   Please check with your insurance carrier before scheduling to verify coverage.   PREVENTATIVE SERVICES FREQUENCY &  COVERAGE DETAILS LAST COMPLETION DATE   Diabetes Screening    Fasting Blood Sugar / Glucose    One screening every 12 months if never tested or if previously tested but not diagnosed with pre-diabetes   One screening every 6 months if diagnosed with pre-diabetes Lab Results   Component Value Date     (H) 05/19/2023        Cardiovascular Disease Screening    Lipid Panel  Cholesterol  Lipoprotein (HDL)  Triglycerides Covered every 5 years for all Medicare beneficiaries without apparent signs or symptoms of cardiovascular disease Lab Results   Component Value Date    CHOLEST 116 05/19/2023    HDL 52 05/19/2023    LDL 51 05/19/2023    TRIG 59 05/19/2023         Electrocardiogram (EKG)   Covered if needed at Welcome to Medicare, and non-screening if indicated for medical reasons 11/01/2021      Ultrasound Screening for Abdominal Aortic Aneurysm (AAA) Covered once in a lifetime for one of the following risk factors    Men who are 65-75 years old and have ever smoked    Anyone with a family history -     Colorectal Cancer Screening  Covered for ages 50-85; only need ONE  of the following:    Colonoscopy   Covered every 10 years    Covered every 2 years if patient is at high risk or previous colonoscopy was abnormal 09/15/2023    Health Maintenance   Topic Date Due    Colorectal Cancer Screening  09/15/2026       Flexible Sigmoidoscopy   Covered every 4 years -    Fecal Occult Blood Test Covered annually -   Prostate Cancer Screening    Prostate-Specific Antigen (PSA) Annually Lab Results   Component Value Date    PSA 4.03 (H) 01/16/2018     Health Maintenance   Topic Date Due    PSA  12/26/2025      Immunizations    Influenza Covered once per flu season  Please get every year 10/05/2023  No recommendations at this time    Pneumococcal Each vaccine (Apsuoqi53 & Rybvyuwqs91) covered once after 65 Prevnar 13: 04/20/2015    Bsrgsegbx83: 10/23/2013     No recommendations at this time    Hepatitis B One screening covered for patients with certain risk factors   -  No recommendations at this time    Tetanus Toxoid Not covered by Medicare Part B unless medically necessary (cut with metal); may be covered with your pharmacy prescription benefits 10/13/1997    Tetanus, Diptheria and Pertusis TD and TDaP Not covered by Medicare Part B -  No recommendations at this time    Zoster Not covered by Medicare Part B; may be covered with your pharmacy  prescription benefits 12/02/2009  No recommendations at this time     Annual Monitoring of Persistent Medications (ACE/ARB, digoxin diuretics, anticonvulsants)    Potassium Annually Lab Results   Component Value Date    K 4.1 05/19/2023         Creatinine   Annually Lab Results   Component Value Date    CREATSERUM 1.16 05/19/2023         BUN Annually Lab Results   Component Value Date    BUN 19 (H) 05/19/2023       Drug Serum Conc Annually No results found for: \"DIGOXIN\", \"DIG\", \"VALP\"          Freida MRUDOCK RN, an APN student, has documented in this chart.  Patient was advised of lab orders placed today as well as medication refills requested. Patient was  involved in plan of care and verbalized understanding. Once lab results have been reviewed assessment of current plan and future follow ups will be discussed.    Patient discussed and examined with student NP. Agree with assessment and plan.

## 2024-06-21 NOTE — PATIENT INSTRUCTIONS
Omega Conway's SCREENING SCHEDULE   Tests on this list are recommended by your physician but may not be covered, or covered at this frequency, by your insurer.   Please check with your insurance carrier before scheduling to verify coverage.   PREVENTATIVE SERVICES FREQUENCY &  COVERAGE DETAILS LAST COMPLETION DATE   Diabetes Screening    Fasting Blood Sugar / Glucose    One screening every 12 months if never tested or if previously tested but not diagnosed with pre-diabetes   One screening every 6 months if diagnosed with pre-diabetes Lab Results   Component Value Date     (H) 05/19/2023        Cardiovascular Disease Screening    Lipid Panel  Cholesterol  Lipoprotein (HDL)  Triglycerides Covered every 5 years for all Medicare beneficiaries without apparent signs or symptoms of cardiovascular disease Lab Results   Component Value Date    CHOLEST 116 05/19/2023    HDL 52 05/19/2023    LDL 51 05/19/2023    TRIG 59 05/19/2023         Electrocardiogram (EKG)   Covered if needed at Welcome to Medicare, and non-screening if indicated for medical reasons 11/01/2021      Ultrasound Screening for Abdominal Aortic Aneurysm (AAA) Covered once in a lifetime for one of the following risk factors   • Men who are 65-75 years old and have ever smoked   • Anyone with a family history -     Colorectal Cancer Screening  Covered for ages 50-85; only need ONE of the following:    Colonoscopy   Covered every 10 years    Covered every 2 years if patient is at high risk or previous colonoscopy was abnormal 09/15/2023    Health Maintenance   Topic Date Due   • Colorectal Cancer Screening  09/15/2026       Flexible Sigmoidoscopy   Covered every 4 years -    Fecal Occult Blood Test Covered annually -   Prostate Cancer Screening    Prostate-Specific Antigen (PSA) Annually Lab Results   Component Value Date    PSA 4.03 (H) 01/16/2018     Health Maintenance   Topic Date Due   • PSA  12/26/2025      Immunizations    Influenza Covered  once per flu season  Please get every year 10/05/2023  No recommendations at this time    Pneumococcal Each vaccine ([id number] & [id number]) covered once after 65 Prevnar 13: 04/20/2015    [id number]: 10/23/2013     No recommendations at this time    Hepatitis B One screening covered for patients with certain risk factors   -  No recommendations at this time    Tetanus Toxoid Not covered by Medicare Part B unless medically necessary (cut with metal); may be covered with your pharmacy prescription benefits 10/13/1997    Tetanus, Diptheria and Pertusis TD and TDaP Not covered by Medicare Part B -  No recommendations at this time    Zoster Not covered by Medicare Part B; may be covered with your pharmacy  prescription benefits 12/02/2009  No recommendations at this time     Annual Monitoring of Persistent Medications (ACE/ARB, digoxin diuretics, anticonvulsants)    Potassium Annually Lab Results   Component Value Date    K 4.1 05/19/2023         Creatinine   Annually Lab Results   Component Value Date    CREATSERUM 1.16 05/19/2023         BUN Annually Lab Results   Component Value Date    BUN 19 (H) 05/19/2023       Drug Serum Conc Annually No results found for: \"DIGOXIN\", \"DIG\", \"VALP\"

## 2024-07-08 ENCOUNTER — TELEPHONE (OUTPATIENT)
Dept: FAMILY MEDICINE CLINIC | Facility: CLINIC | Age: 74
End: 2024-07-08

## 2024-07-08 NOTE — TELEPHONE ENCOUNTER
Spoke with patient - having leg \"cramps\" - for \"awhile\"  Last office visit with DONTE Cameron  on 6/21/24  Patient states did not have symptoms at his office visit.    \"All of his legs hurt\" - not just the calf.  No redness or warm to touch.  Worse within this last week.    Please advise.    Future Appointments   Date Time Provider Department Center   7/18/2024  1:20 PM Felecia Lozano APRN [facility]

## 2024-07-08 NOTE — TELEPHONE ENCOUNTER
Spoke with patient - patient states he thought he was going to [place].    Patient would like to see someone sooner than the 18th.  Future Appointments   Date Time Provider Department Center   7/9/2024 11:40 AM Monse Kirkland APRN [facility]      Patient repeated back that he will be going to the [place] office.

## 2024-07-08 NOTE — TELEPHONE ENCOUNTER
I would keep upcoming appointment. Push fluids. If acutely worsening, go to [facility]  Could also see Monse if she has sooner appointment available.

## 2024-07-08 NOTE — TELEPHONE ENCOUNTER
Patient has been having leg cramps for the last few weeks, states very painful and in both legs, states not warm to the touch.   Patient scheduled an appointment with Felecia lozano  On 7/18/24.  Ok to wait or does he need to be seen sooner?      Future Appointments   Date Time Provider Department Center   7/18/2024  1:20 PM Felecia Lozano, APRN [facility]

## 2024-07-09 ENCOUNTER — OFFICE VISIT (OUTPATIENT)
Dept: FAMILY MEDICINE CLINIC | Facility: CLINIC | Age: 74
End: 2024-07-09
Payer: MEDICARE

## 2024-07-09 VITALS
WEIGHT: 176 LBS | RESPIRATION RATE: 16 BRPM | TEMPERATURE: 98 F | BODY MASS INDEX: 24.64 KG/M2 | SYSTOLIC BLOOD PRESSURE: 140 MMHG | OXYGEN SATURATION: 98 % | HEIGHT: 71 IN | DIASTOLIC BLOOD PRESSURE: 64 MMHG | HEART RATE: 69 BPM

## 2024-07-09 DIAGNOSIS — R25.2 LEG CRAMPING: ICD-10-CM

## 2024-07-09 DIAGNOSIS — R25.2 MUSCLE CRAMPS: Primary | ICD-10-CM

## 2024-07-09 PROCEDURE — 1159F MED LIST DOCD IN RCRD: CPT | Performed by: NURSE PRACTITIONER

## 2024-07-09 PROCEDURE — 3077F SYST BP >= 140 MM HG: CPT | Performed by: NURSE PRACTITIONER

## 2024-07-09 PROCEDURE — 1170F FXNL STATUS ASSESSED: CPT | Performed by: NURSE PRACTITIONER

## 2024-07-09 PROCEDURE — 3078F DIAST BP <80 MM HG: CPT | Performed by: NURSE PRACTITIONER

## 2024-07-09 PROCEDURE — 99213 OFFICE O/P EST LOW 20 MIN: CPT | Performed by: NURSE PRACTITIONER

## 2024-07-09 PROCEDURE — 3008F BODY MASS INDEX DOCD: CPT | Performed by: NURSE PRACTITIONER

## 2024-07-09 PROCEDURE — 1160F RVW MEDS BY RX/DR IN RCRD: CPT | Performed by: NURSE PRACTITIONER

## 2024-07-09 NOTE — PROGRESS NOTES
CHIEF COMPLAINT:    Chief Complaint   Patient presents with    Musculoskeletal Problem     Bilateral leg cramping-during the day and night time, off and on for several years       HISTORY OF PRESENT ILLNESS:    Omega who has a history of atherosclerosis of coronary artery, PVD of carotid arteries, hypertension, and GERD presents today, July 09, 2024, for muscle cramps for over 10 years.  Recently went fishing and experienced leg cramps that night.  Muscle cramps involve calf muscles.  Muscle cramps occur randomly as well as after physical activity and when repositioning from sitting to standing.  Omega also experiences occasional cramps in thighs and muscle aches to bilateral shoulders.  Denies injury, swelling, pitting edema, redness, or wounds.    Omega is currently taking atorvastatin 40mg.  He denies diagnosis of high cholesterol, stating labs have always been within normal range and that cardiology placed him on this medication due to arthrosclerosis of coronary artery and to prevent further buildup.  Denies cardiac stents.    ALLERGIES:  Allergies   Allergen Reactions    Amoxicillin-Pot Clavulanate DIARRHEA and NAUSEA ONLY       CURRENT MEDICATIONS:  Current Outpatient Medications   Medication Sig Dispense Refill    atorvastatin 40 MG Oral Tab Take 1 tablet (40 mg total) by mouth nightly. 90 tablet 0    lisinopril 20 MG Oral Tab Take 1 tablet (20 mg total) by mouth daily. 90 tablet 0    fluticasone propionate 50 MCG/ACT Nasal Suspension 2 sprays by Nasal route daily. 3 each 0    omeprazole 20 MG Oral Capsule Delayed Release Take 1 capsule (20 mg total) by mouth every morning. 90 capsule 1    Tadalafil 10 MG Oral Tab Take 1 tablet (10 mg total) by mouth daily as needed for Erectile Dysfunction. 10 tablet 1    acidophilus-pectin Oral Cap Take 1 capsule by mouth daily. (Patient not taking: Reported on 6/21/2024)      Vitamin D, Cholecalciferol, 50 MCG (2000 UT) Oral Cap Take 2,000 Units by mouth daily.      omega-3  fatty acids 1000 MG Oral Cap Take 1,000 mg by mouth daily.      aspirin 81 MG Oral Chew Tab Chew 1 tablet (81 mg total) by mouth daily.         MEDICAL HISTORY:  Past Medical History:    Actinic keratosis    Anesthesia complication    Cancer (HCC)    skin cancers with surgery    Cataract    Elevated PSA, less than 10 ng/ml    Esophageal reflux    Hearing impairment    hearing aids    High blood pressure    High cholesterol    Hx of colonic polyp    Hyperlipidemia    Osteoarthritis    Unspecified essential hypertension    Visual impairment     Past Surgical History:   Procedure Laterality Date    Colonoscopy  2017    tics; repeat 5 yrs    Colonoscopy N/A 9/15/2023    Procedure: COLONOSCOPY with cold snare polypectomy;  Surgeon: Flaco Zavala DO;  Location:  ENDOSCOPY    Egd N/A 2017    Procedure: ESOPHAGOGASTRODUODENOSCOPY, COLONOSCOPY, POSSIBLE BIOPSY, POSSIBLE POLYPECTOMY 04242, 92453;  Surgeon: Maycol Hurd MD;  Location: [facility]    Egd  2021    [place], [facility] NO dysplasia, rpt 5 yrs    Other surgical history  2023    [facility]    Tonsillectomy      Upper gi endo poss barrtts  2017    [facility]; HH- repeat 3 yr (no dysplasia)     Family History   Problem Relation Age of Onset    Cancer Father         pancreatic    Diabetes Father     Cancer Mother         breast    Diabetes Mother     Cancer Sister         melanoma-cancer free    Heart Disease Neg     Stroke Neg      Family Status   Relation Status    Donny Alive    Fa  at age 70        pancreatic cancer    Mo  at age mid 80s        probable diabetic complication (had leg amputation)    Sis Alive    Bro Alive    Sis Alive    Bro Alive    Bro Alive    Bro Alive    Bro Alive    Bro Alive    NEG (Not Specified)     Social History     Socioeconomic History    Marital status:    Tobacco Use    Smoking status: Former     Current packs/day: 0.00     Types: Cigarettes     Quit date:      Years since  quittin.5    Smokeless tobacco: Never    Tobacco comments:     briefly in army, 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  Sitting upright on exam table.  Well developed, well nourished, and in no acute distress  HEENT:      Facial features symmetric. Normocephalic and atraumatic  Cardiovascular:      Heart sounds: Regular rate and rhythm without murmur     No edema or discoloration of BLE, overall pale complexion.     BLE with varicose veins     Dorsalis pedis pulse and posterior tibial pulses strong 2+ bilaterally  Pulmonary:      Chest expansion symmetric.  Breathing nonlabored. Lungs clear throughout     No cough.  Abdomen:       Nondistended.     Bowel sounds normoactive.     Abdomen soft, nontender, without organomegaly.       No CVA tenderness.  Musculoskeletal:         Movements smooth and controlled with appropriate coordination.       Gait is steady, nonantalgic.  Neuro:       No focal deficits, cranial nerves grossly intact.       Movements smooth and controlled, appropriate coordination without ataxia or tremors.  Skin:     Warm and dry without jaundice or rashes.  Feet are without wounds.  Psychiatric:         Alert and oriented.  Calm and cooperative.  Speech is clear.     ASSESSMENT & PLAN:    Ddx:  side effect of atorvastatin, dehydration, vs. pvd    1. Muscle cramps  Work towards adequate hydration  Consider US for YADIRA to assess for PVD of BLE  If negative for severe PVD  considering decreasing atorvastatin  Recommending patient discusses with cardiology team as well  Mychart message sent to neeru     2. Leg cramping  - US ANKLE BRACHIAL INDEX (CPT=93922); Future    Records reviewed from 2015 and 2019 - shows plaque in carotid arteries, confirming PVD

## 2024-07-22 DIAGNOSIS — K22.70 BARRETT'S ESOPHAGUS WITHOUT DYSPLASIA: ICD-10-CM

## 2024-07-22 DIAGNOSIS — Z86.010 PERSONAL HISTORY OF COLONIC POLYPS: ICD-10-CM

## 2024-07-22 DIAGNOSIS — K21.00 GASTROESOPHAGEAL REFLUX DISEASE WITH ESOPHAGITIS: ICD-10-CM

## 2024-07-22 RX ORDER — OMEPRAZOLE 20 MG/1
20 CAPSULE, DELAYED RELEASE ORAL EVERY MORNING
Qty: 90 CAPSULE | Refills: 1 | Status: SHIPPED | OUTPATIENT
Start: 2024-07-22

## 2024-07-25 ENCOUNTER — TELEPHONE (OUTPATIENT)
Dept: FAMILY MEDICINE CLINIC | Facility: CLINIC | Age: 74
End: 2024-07-25

## 2024-07-25 NOTE — TELEPHONE ENCOUNTER
Received Medical Records request from [employer] [facility] [facility] [employer]  Fax # [phone]  Ph.  ext [phone]    Sent to [facility]en

## 2024-08-16 ENCOUNTER — HOSPITAL ENCOUNTER (OUTPATIENT)
Age: 74
Discharge: HOME | End: 2024-08-16
Payer: MEDICARE

## 2024-08-16 ENCOUNTER — APPOINTMENT (OUTPATIENT)
Dept: CT IMAGING | Age: 74
End: 2024-08-16
Attending: PHYSICIAN ASSISTANT
Payer: MEDICARE

## 2024-08-16 VITALS
HEART RATE: 72 BPM | RESPIRATION RATE: 16 BRPM | TEMPERATURE: 98 F | SYSTOLIC BLOOD PRESSURE: 141 MMHG | DIASTOLIC BLOOD PRESSURE: 75 MMHG | OXYGEN SATURATION: 96 %

## 2024-08-16 DIAGNOSIS — R19.7 DIARRHEA, UNSPECIFIED TYPE: Primary | ICD-10-CM

## 2024-08-16 LAB
#MXD IC: 0.8 X10ˆ3/UL (ref 0.1–1)
BUN BLD-MCNC: 22 MG/DL (ref 7–18)
CHLORIDE BLD-SCNC: 107 MMOL/L (ref 98–112)
CO2 BLD-SCNC: 23 MMOL/L (ref 21–32)
CREAT BLD-MCNC: 1.2 MG/DL
EGFRCR SERPLBLD CKD-EPI 2021: 63 ML/MIN/1.73M2 (ref 60–?)
GLUCOSE BLD-MCNC: 117 MG/DL (ref 70–99)
HCT VFR BLD AUTO: 41.9 %
HCT VFR BLD CALC: 40 %
HGB BLD-MCNC: 14.2 G/DL
ISTAT IONIZED CALCIUM FOR CHEM 8: 1.15 MMOL/L (ref 1.12–1.32)
LYMPHOCYTES # BLD AUTO: 1.6 X10ˆ3/UL (ref 1–4)
LYMPHOCYTES NFR BLD AUTO: 16.7 %
MCH RBC QN AUTO: 31.6 PG (ref 26–34)
MCHC RBC AUTO-ENTMCNC: 33.9 G/DL (ref 31–37)
MCV RBC AUTO: 93.1 FL (ref 80–100)
MIXED CELL %: 8.9 %
NEUTROPHILS # BLD AUTO: 7.1 X10ˆ3/UL (ref 1.5–7.7)
NEUTROPHILS NFR BLD AUTO: 74.4 %
PLATELET # BLD AUTO: 212 X10ˆ3/UL (ref 150–450)
POTASSIUM BLD-SCNC: 4.2 MMOL/L (ref 3.6–5.1)
RBC # BLD AUTO: 4.5 X10ˆ6/UL
SODIUM BLD-SCNC: 142 MMOL/L (ref 136–145)
WBC # BLD AUTO: 9.5 X10ˆ3/UL (ref 4–11)

## 2024-08-16 PROCEDURE — 74177 CT ABD & PELVIS W/CONTRAST: CPT | Performed by: PHYSICIAN ASSISTANT

## 2024-08-16 PROCEDURE — 99213 OFFICE O/P EST LOW 20 MIN: CPT | Performed by: PHYSICIAN ASSISTANT

## 2024-08-16 PROCEDURE — 85025 COMPLETE CBC W/AUTO DIFF WBC: CPT | Performed by: PHYSICIAN ASSISTANT

## 2024-08-16 PROCEDURE — 80047 BASIC METABLC PNL IONIZED CA: CPT | Performed by: PHYSICIAN ASSISTANT

## 2024-08-16 RX ORDER — IOHEXOL 350 MG/ML
100 INJECTION, SOLUTION INTRAVENOUS
Status: COMPLETED | OUTPATIENT
Start: 2024-08-16 | End: 2024-08-16

## 2024-08-16 RX ORDER — DICYCLOMINE HCL 20 MG
20 TABLET ORAL 4 TIMES DAILY PRN
Qty: 30 TABLET | Refills: 0 | Status: SHIPPED | OUTPATIENT
Start: 2024-08-16 | End: 2024-09-15

## 2024-08-16 NOTE — ED INITIAL ASSESSMENT (HPI)
Pt with diarrhea for about 1 week. Took pepto and it got better for a couple of days but diarrhea returned yesterday. Pt taken pepto x2 today. 7 episodes of diarrhea today. Denies blood. Pt states stool is not solid but not running out described a mooshy but has to go urgently.

## 2024-08-16 NOTE — ED PROVIDER NOTES
Patient Seen in: [facility]      History     Chief Complaint   Patient presents with    Nausea/Vomiting/Diarrhea     Stated Complaint: diarrhea    Subjective:   The history is provided by the patient.       75 yo male with HTN, HLD presents to the IC due to diarrhea for the past week. Initially started with watery diarrhea for 2 days - no fevers, NV nor abdominal pain. Symptoms resolved after taking pepto bismol however returned last night, now with more lower abdominal cramping. Endorsing 8 bouts of loose stool.  No bloody or black stool. Continues to deny any fevers, NV, urinary complaints. Admits he did eat cake and ice cream last night. Attempted pepto bismol today without relief.     No recent travel or camping  No recent abx or hospitalizations.   Grandson with similar symptoms 3 weeks ago.   Recent colonoscopy with known diverticulosis, no previous diverticulitis     Objective:   Past Medical History:    Actinic keratosis    Anesthesia complication    Cancer (HCC)    skin cancers with surgery    Cataract    Elevated PSA, less than 10 ng/ml    Esophageal reflux    Hearing impairment    hearing aids    High blood pressure    High cholesterol    Hx of colonic polyp    Hyperlipidemia    Osteoarthritis    Unspecified essential hypertension    Visual impairment              Past Surgical History:   Procedure Laterality Date    Colonoscopy  07/2017    tics; repeat 5 yrs    Colonoscopy N/A 9/15/2023    Procedure: COLONOSCOPY with cold snare polypectomy;  Surgeon: Flaco Zavala DO;  Location:  ENDOSCOPY    Egd N/A 07/20/2017    Procedure: ESOPHAGOGASTRODUODENOSCOPY, COLONOSCOPY, POSSIBLE BIOPSY, POSSIBLE POLYPECTOMY 54867, 37705;  Surgeon: Maycol Hurd MD;  Location: [facility]    Egd  04/2021    [facility] NO dysplasia, rpt 5 yrs    Other surgical history  02/2023    [facility]    Tonsillectomy      Upper gi endo poss barrtts  07/2017    [facility]; HH- repeat 3 yr (no  dysplasia)                Social History     Socioeconomic History    Marital status:    Tobacco Use    Smoking status: Former     Current packs/day: 0.00     Types: Cigarettes     Quit date: 1968     Years since quittin.6    Smokeless tobacco: Never    Tobacco comments:     briefly in army, 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    HENT: Negative.     Respiratory: Negative.     Cardiovascular: Negative.    Gastrointestinal:  Positive for abdominal pain and diarrhea. Negative for anal bleeding, blood in stool, constipation, nausea and vomiting.   Genitourinary: Negative.        Positive for stated Chief Complaint: Nausea/Vomiting/Diarrhea    Other systems are as noted in HPI.  Constitutional and vital signs reviewed.      All other systems reviewed and negative except as noted above.    Physical Exam     ED Triage Vitals [24 1223]   BP (!) 135/92   Pulse 80   Resp 18   Temp 98.9 °F (37.2 °C)   Temp src Temporal   SpO2 97 %   O2 Device None (Room air)       Current Vitals:   Vital Signs  BP: 141/75  Pulse: 72  Resp: 16  Temp: 98.3 °F (36.8 °C)  Temp src: Temporal    Oxygen Therapy  SpO2: 96 %  O2 Device: None (Room air)            Physical Exam  Vitals and nursing note reviewed.   Constitutional:       Appearance: He is well-developed.   HENT:      Head: Normocephalic.      Mouth/Throat:      Mouth: Mucous membranes are moist.   Eyes:      Extraocular Movements: Extraocular movements intact.      Pupils: Pupils are equal, round, and reactive to light.   Cardiovascular:      Rate and Rhythm: Normal rate and regular rhythm.   Pulmonary:      Effort: Pulmonary effort is normal.      Breath sounds: Normal breath sounds.   Abdominal:      General: Abdomen is flat. Bowel sounds are normal.      Palpations: Abdomen is soft.      Tenderness:  There is abdominal tenderness in the left lower quadrant. There is no guarding or rebound.   Skin:     General: Skin is warm.   Neurological:      General: No focal deficit present.      Mental Status: He is alert and oriented to person, place, and time.   Psychiatric:         Mood and Affect: Mood normal.         Behavior: Behavior normal.               ED Course     Labs Reviewed   POCT ISTAT CHEM8 CARTRIDGE - Abnormal; Notable for the following components:       Result Value    ISTAT BUN 22 (*)     ISTAT Glucose 117 (*)     All other components within normal limits   POCT CBC     CT ABDOMEN+PELVIS(CONTRAST ONLY)(CPT=74177)    Result Date: 8/16/2024  PROCEDURE:  CT ABDOMEN+PELVIS (CONTRAST ONLY) (CPT=74177)  COMPARISON:  None.  INDICATIONS:  abdominal cramping, diarrhea, known diverticulosis  TECHNIQUE:  CT scanning was performed from the dome of the diaphragm to the pubic symphysis with non-ionic intravenous contrast material. Post contrast coronal MPR imaging was performed.  Dose reduction techniques were used. Dose information is transmitted to the ACR (American College of Radiology) NRDR (National Radiology Data Registry) which includes the Dose Index Registry.  PATIENT STATED HISTORY:(As transcribed by Technologist)  The patient has diarrhea for several days with lower abdominal cramping.   CONTRAST USED:  100cc of Omnipaque 350  FINDINGS:  LIVER:  No enlargement, atrophy, abnormal density, or significant focal lesion.  There is a simple cyst in the left lobe of the liver measuring 17 mm. BILIARY:  No visible dilatation or calcification.  PANCREAS:  No lesion, fluid collection, ductal dilatation, or atrophy.  SPLEEN:  No enlargement or focal lesion.  KIDNEYS:  No mass, obstruction, or calcification.  There is a cyst in the left lower pole of the kidney measuring 46 x 50 mm.  There is a cyst within the right upper pole of the kidney measuring 9 x 10 mm.  The cysts are simple.  There is a nonobstructing stone  in the left upper pole of the kidney measuring 4 mm. ADRENALS:  No mass or enlargement.  AORTA/VASCULAR:  No aneurysm or dissection.  RETROPERITONEUM:  No mass or adenopathy.  BOWEL/MESENTERY:  No visible mass, obstruction, or bowel wall thickening.  There is diverticulosis of the sigmoid colon without evidence of diverticulitis.  The appendix is normal.  The visualized small bowel is unremarkable. ABDOMINAL WALL:  No mass or hernia.  URINARY BLADDER:  No visible focal wall thickening, lesion, or calculus.  PELVIC NODES:  No adenopathy.  PELVIC ORGANS:  No visible mass.  Pelvic organs appropriate for patient age.  BONES:  No bony lesion or fracture.  LUNG BASES:  No visible pulmonary or pleural disease.  OTHER:  Negative.             CONCLUSION:   1.  Diverticulosis of the sigmoid colon without ends of diverticulitis.   2. Nonobstructing left upper pole kidney stone.   3. No acute abdominal pelvic process.    LOCATION:  [facility]   Dictated by (CST): Ponce Leung MD on 8/16/2024 at 1:54 PM     Finalized by (CST): Ponce Leung MD on 8/16/2024 at 1:58 PM                       MDM   Ddx- viral gastroenteritis, infectious diarrhea, colitis, diverticulitis       On exam the patient is afebrile. Nontoxic. No acute distress. VSS. Abdomen is soft mild tenderness in the LLQ. No guarding no rebound - due to persistent diarrhea now with abdominal cramping and hx of diverticulosis. CT abdomen and pelvis performed and negative. Cbc and istat unremarkable. Discussed bland diet. Dicyclomine for cramping. Stools study collection kit provided if symptoms persist. Strict ER return precautions were discussed. Pt voiced understanding and in agreement.                              Medical Decision Making  Problems Addressed:  Diarrhea, unspecified type: acute illness or injury    Amount and/or Complexity of Data Reviewed  Labs: ordered. Decision-making details documented in ED Course.  Radiology: ordered and independent interpretation  performed. Decision-making details documented in ED Course.    Risk  OTC drugs.  Prescription drug management.        Disposition and Plan     Clinical Impression:  1. Diarrhea, unspecified type         Disposition:  Discharge  8/16/2024  2:07 pm    Follow-up:  No follow-up provider specified.        Medications Prescribed:  Discharge Medication List as of 8/16/2024  2:09 PM        START taking these medications    Details   dicyclomine 20 MG Oral Tab Take 1 tablet (20 mg total) by mouth 4 (four) times daily as needed., Normal, Disp-30 tablet, R-0

## 2024-08-16 NOTE — DISCHARGE INSTRUCTIONS
Increase fluid and rest  Continue to eat bland diet- bananas, rice, apple sauce, toast  Slowly advance your diet  Bring back stool studies to ensure no infectious symptoms as instructed  Follow up with primary care doctor in 48 hours  Return to the ER if symptoms worsen

## 2024-08-22 ENCOUNTER — TELEPHONE (OUTPATIENT)
Dept: FAMILY MEDICINE CLINIC | Facility: CLINIC | Age: 74
End: 2024-08-22

## 2024-08-22 NOTE — TELEPHONE ENCOUNTER
Received Medical Records Request from [employer] [facility] [employer]    Fax # [phone]  Ph.  ext [phone]    Sent to [employer]  Copy sent to scan

## 2024-08-28 DIAGNOSIS — R93.1 ABNORMAL HEART SCORE CT: ICD-10-CM

## 2024-08-28 DIAGNOSIS — I25.10 ATHEROSCLEROSIS OF NATIVE CORONARY ARTERY OF NATIVE HEART: ICD-10-CM

## 2024-08-28 DIAGNOSIS — H65.93 MIDDLE EAR EFFUSION, BILATERAL: ICD-10-CM

## 2024-08-28 DIAGNOSIS — E78.00 PURE HYPERCHOLESTEROLEMIA: ICD-10-CM

## 2024-08-28 DIAGNOSIS — I73.9 PVD (PERIPHERAL VASCULAR DISEASE) (HCC): ICD-10-CM

## 2024-08-28 DIAGNOSIS — R42 EPISODIC LIGHTHEADEDNESS: ICD-10-CM

## 2024-08-28 DIAGNOSIS — N52.9 ERECTILE DYSFUNCTION, UNSPECIFIED ERECTILE DYSFUNCTION TYPE: ICD-10-CM

## 2024-08-28 DIAGNOSIS — I25.10 ATHEROSCLEROSIS OF NATIVE CORONARY ARTERY OF NATIVE HEART, UNSPECIFIED WHETHER ANGINA PRESENT: ICD-10-CM

## 2024-08-28 DIAGNOSIS — I45.10 RBBB: ICD-10-CM

## 2024-08-28 DIAGNOSIS — R44.8 FACIAL PRESSURE: ICD-10-CM

## 2024-08-28 DIAGNOSIS — I10 ESSENTIAL HYPERTENSION: ICD-10-CM

## 2024-08-28 RX ORDER — TADALAFIL 10 MG/1
10 TABLET ORAL
Qty: 10 TABLET | Refills: 0 | Status: SHIPPED | OUTPATIENT
Start: 2024-08-28 | End: 2024-08-28 | Stop reason: DRUGHIGH

## 2024-08-28 RX ORDER — LISINOPRIL 20 MG/1
20 TABLET ORAL DAILY
Qty: 90 TABLET | Refills: 0 | Status: SHIPPED | OUTPATIENT
Start: 2024-08-28

## 2024-08-28 RX ORDER — ATORVASTATIN CALCIUM 40 MG/1
40 TABLET, FILM COATED ORAL NIGHTLY
Qty: 90 TABLET | Refills: 0 | Status: SHIPPED | OUTPATIENT
Start: 2024-08-28

## 2024-08-28 NOTE — TELEPHONE ENCOUNTER
Patient called and states he needed the 5mg tadalafil filled instead of the 10 mg. Please advise     TADALAFIL 5 MG Oral Tab

## 2024-08-28 NOTE — TELEPHONE ENCOUNTER
Cholesterol Medication Protocol Passed   Hypertension Medications Protocol [id number]/28/2024 09:04 AM   Protocol Details Last BP reading less than 140/90    CMP or BMP in past 12 months    In person appointment or virtual visit in the past 12 mos or appointment in next 3 mos    EGFRCR or GFRNAA > 50     BP Readings from Last 3 Encounters:   08/16/24 141/75   07/09/24 140/64   06/21/24 128/68     Last office visit 6/21/24  Lisinopril last refilled on 6/3/24 for # 90 with 0 refills  Vialsis 12/26/23  #10 1 refill  No future appointments.     Thank you.

## 2024-08-28 NOTE — TELEPHONE ENCOUNTER
Patient called requesting refills    TADALAFIL 5 MG Oral Tab     lisinopril 20 MG Oral Tab     atorvastatin 40 MG Oral Tab     [id number] - [place], [place] - [address]

## 2024-08-29 RX ORDER — TADALAFIL 5 MG/1
5 TABLET ORAL DAILY
Qty: 90 TABLET | Refills: 0 | Status: SHIPPED | OUTPATIENT
Start: 2024-08-29

## 2024-09-30 ENCOUNTER — TELEPHONE (OUTPATIENT)
Dept: FAMILY MEDICINE CLINIC | Facility: CLINIC | Age: 74
End: 2024-09-30

## 2024-11-23 RX ORDER — FLUTICASONE PROPIONATE 50 MCG
2 SPRAY, SUSPENSION (ML) NASAL DAILY
Qty: 48 G | Refills: 0 | Status: SHIPPED | OUTPATIENT
Start: 2024-11-23

## 2024-11-28 ENCOUNTER — APPOINTMENT (OUTPATIENT)
Dept: GENERAL RADIOLOGY | Age: 74
End: 2024-11-28
Attending: EMERGENCY MEDICINE
Payer: MEDICARE

## 2024-11-28 ENCOUNTER — HOSPITAL ENCOUNTER (OUTPATIENT)
Facility: HOSPITAL | Age: 74
Setting detail: OBSERVATION
Discharge: HOME | End: 2024-11-29
Attending: EMERGENCY MEDICINE | Admitting: STUDENT IN AN ORGANIZED HEALTH CARE EDUCATION/TRAINING PROGRAM
Payer: MEDICARE

## 2024-11-28 DIAGNOSIS — R07.9 ACUTE CHEST PAIN: Primary | ICD-10-CM

## 2024-11-28 LAB
ALBUMIN SERPL-MCNC: 4.8 G/DL (ref 3.2–4.8)
ALBUMIN/GLOB SERPL: 2.1 {RATIO} (ref 1–2)
ALP LIVER SERPL-CCNC: 94 U/L
ALT SERPL-CCNC: 14 U/L
ANION GAP SERPL CALC-SCNC: 5 MMOL/L (ref 0–18)
AST SERPL-CCNC: 16 U/L (ref ?–34)
BASOPHILS # BLD AUTO: 0.03 X10(3) UL (ref 0–0.2)
BASOPHILS NFR BLD AUTO: 0.4 %
BILIRUB SERPL-MCNC: 1.2 MG/DL (ref 0.2–1.1)
BUN BLD-MCNC: 13 MG/DL (ref 9–23)
BUN BLD-MCNC: 16 MG/DL (ref 7–18)
CALCIUM BLD-MCNC: 10.3 MG/DL (ref 8.7–10.4)
CHLORIDE BLD-SCNC: 102 MMOL/L (ref 98–112)
CHLORIDE SERPL-SCNC: 107 MMOL/L (ref 98–112)
CO2 BLD-SCNC: 24 MMOL/L (ref 21–32)
CO2 SERPL-SCNC: 25 MMOL/L (ref 21–32)
CREAT BLD-MCNC: 1.1 MG/DL
CREAT BLD-MCNC: 1.18 MG/DL
EGFRCR SERPLBLD CKD-EPI 2021: 64 ML/MIN/1.73M2 (ref 60–?)
EGFRCR SERPLBLD CKD-EPI 2021: 70 ML/MIN/1.73M2 (ref 60–?)
EOSINOPHIL # BLD AUTO: 0.22 X10(3) UL (ref 0–0.7)
EOSINOPHIL NFR BLD AUTO: 3 %
ERYTHROCYTE [DISTWIDTH] IN BLOOD BY AUTOMATED COUNT: 12.7 %
GLOBULIN PLAS-MCNC: 2.3 G/DL (ref 2–3.5)
GLUCOSE BLD-MCNC: 120 MG/DL (ref 70–99)
GLUCOSE BLD-MCNC: 122 MG/DL (ref 70–99)
HCT VFR BLD AUTO: 43.8 %
HCT VFR BLD CALC: 43 %
HGB BLD-MCNC: 15.8 G/DL
IMM GRANULOCYTES # BLD AUTO: 0.02 X10(3) UL (ref 0–1)
IMM GRANULOCYTES NFR BLD: 0.3 %
ISTAT IONIZED CALCIUM FOR CHEM 8: 1.22 MMOL/L (ref 1.12–1.32)
LYMPHOCYTES # BLD AUTO: 1.84 X10(3) UL (ref 1–4)
LYMPHOCYTES NFR BLD AUTO: 24.7 %
MCH RBC QN AUTO: 32.9 PG (ref 26–34)
MCHC RBC AUTO-ENTMCNC: 36.1 G/DL (ref 31–37)
MCV RBC AUTO: 91.3 FL
MONOCYTES # BLD AUTO: 0.54 X10(3) UL (ref 0.1–1)
MONOCYTES NFR BLD AUTO: 7.2 %
NEUTROPHILS # BLD AUTO: 4.8 X10 (3) UL (ref 1.5–7.7)
NEUTROPHILS # BLD AUTO: 4.8 X10(3) UL (ref 1.5–7.7)
NEUTROPHILS NFR BLD AUTO: 64.4 %
OSMOLALITY SERPL CALC.SUM OF ELEC: 285 MOSM/KG (ref 275–295)
PLATELET # BLD AUTO: 206 10(3)UL (ref 150–450)
POTASSIUM BLD-SCNC: 3.9 MMOL/L (ref 3.6–5.1)
POTASSIUM SERPL-SCNC: 3.8 MMOL/L (ref 3.5–5.1)
PROT SERPL-MCNC: 7.1 G/DL (ref 5.7–8.2)
RBC # BLD AUTO: 4.8 X10(6)UL
SODIUM BLD-SCNC: 140 MMOL/L (ref 136–145)
SODIUM SERPL-SCNC: 137 MMOL/L (ref 136–145)
TROPONIN I BLD-MCNC: <0.02 NG/ML
TROPONIN I SERPL HS-MCNC: 8 NG/L
TROPONIN I SERPL HS-MCNC: 9 NG/L
TROPONIN I SERPL HS-MCNC: 9 NG/L
WBC # BLD AUTO: 7.5 X10(3) UL (ref 4–11)

## 2024-11-28 PROCEDURE — 99223 1ST HOSP IP/OBS HIGH 75: CPT | Performed by: INTERNAL MEDICINE

## 2024-11-28 PROCEDURE — 71045 X-RAY EXAM CHEST 1 VIEW: CPT | Performed by: EMERGENCY MEDICINE

## 2024-11-28 RX ORDER — ONDANSETRON 2 MG/ML
4 INJECTION INTRAMUSCULAR; INTRAVENOUS EVERY 6 HOURS PRN
Status: DISCONTINUED | OUTPATIENT
Start: 2024-11-28 | End: 2024-11-29

## 2024-11-28 RX ORDER — SENNOSIDES 8.6 MG
17.2 TABLET ORAL NIGHTLY PRN
Status: DISCONTINUED | OUTPATIENT
Start: 2024-11-28 | End: 2024-11-29

## 2024-11-28 RX ORDER — ASPIRIN 81 MG/1
243 TABLET, CHEWABLE ORAL ONCE
Status: COMPLETED | OUTPATIENT
Start: 2024-11-28 | End: 2024-11-28

## 2024-11-28 RX ORDER — ATORVASTATIN CALCIUM 40 MG/1
40 TABLET, FILM COATED ORAL NIGHTLY
Status: DISCONTINUED | OUTPATIENT
Start: 2024-11-28 | End: 2024-11-29

## 2024-11-28 RX ORDER — BISACODYL 10 MG
10 SUPPOSITORY, RECTAL RECTAL
Status: DISCONTINUED | OUTPATIENT
Start: 2024-11-28 | End: 2024-11-29

## 2024-11-28 RX ORDER — BENZONATATE 100 MG/1
200 CAPSULE ORAL 3 TIMES DAILY PRN
Status: DISCONTINUED | OUTPATIENT
Start: 2024-11-28 | End: 2024-11-29

## 2024-11-28 RX ORDER — ENOXAPARIN SODIUM 100 MG/ML
40 INJECTION SUBCUTANEOUS DAILY
Status: CANCELLED | OUTPATIENT
Start: 2024-11-29

## 2024-11-28 RX ORDER — MULTIVIT-MIN/IRON/FOLIC ACID/K 18-600-40
2000 CAPSULE ORAL DAILY
Status: DISCONTINUED | OUTPATIENT
Start: 2024-11-28 | End: 2024-11-28

## 2024-11-28 RX ORDER — ASPIRIN 81 MG/1
81 TABLET, CHEWABLE ORAL DAILY
Status: DISCONTINUED | OUTPATIENT
Start: 2024-11-29 | End: 2024-11-29

## 2024-11-28 RX ORDER — POLYETHYLENE GLYCOL 3350 17 G/17G
17 POWDER, FOR SOLUTION ORAL DAILY PRN
Status: DISCONTINUED | OUTPATIENT
Start: 2024-11-28 | End: 2024-11-29

## 2024-11-28 RX ORDER — SODIUM PHOSPHATE, DIBASIC AND SODIUM PHOSPHATE, MONOBASIC 7; 19 G/230ML; G/230ML
1 ENEMA RECTAL ONCE AS NEEDED
Status: DISCONTINUED | OUTPATIENT
Start: 2024-11-28 | End: 2024-11-29

## 2024-11-28 RX ORDER — LISINOPRIL 20 MG/1
20 TABLET ORAL DAILY
Status: DISCONTINUED | OUTPATIENT
Start: 2024-11-29 | End: 2024-11-29

## 2024-11-28 RX ORDER — LISINOPRIL 20 MG/1
20 TABLET ORAL DAILY
Status: DISCONTINUED | OUTPATIENT
Start: 2024-11-28 | End: 2024-11-28

## 2024-11-28 RX ORDER — TADALAFIL 5 MG/1
5 TABLET ORAL DAILY
Status: DISCONTINUED | OUTPATIENT
Start: 2024-11-29 | End: 2024-11-29

## 2024-11-28 RX ORDER — ACETAMINOPHEN 500 MG
500 TABLET ORAL EVERY 4 HOURS PRN
Status: DISCONTINUED | OUTPATIENT
Start: 2024-11-28 | End: 2024-11-29

## 2024-11-28 RX ORDER — ASPIRIN 81 MG/1
81 TABLET, CHEWABLE ORAL DAILY
Status: DISCONTINUED | OUTPATIENT
Start: 2024-11-28 | End: 2024-11-28

## 2024-11-28 RX ORDER — ASPIRIN 325 MG
325 TABLET, DELAYED RELEASE (ENTERIC COATED) ORAL ONCE
Status: DISCONTINUED | OUTPATIENT
Start: 2024-11-28 | End: 2024-11-28

## 2024-11-28 RX ORDER — ENOXAPARIN SODIUM 100 MG/ML
40 INJECTION SUBCUTANEOUS DAILY
Status: DISCONTINUED | OUTPATIENT
Start: 2024-11-28 | End: 2024-11-29

## 2024-11-28 RX ORDER — TADALAFIL 5 MG/1
5 TABLET ORAL DAILY
Status: DISCONTINUED | OUTPATIENT
Start: 2024-11-28 | End: 2024-11-28

## 2024-11-28 RX ORDER — FLUTICASONE PROPIONATE 50 MCG
2 SPRAY, SUSPENSION (ML) NASAL NIGHTLY
Status: DISCONTINUED | OUTPATIENT
Start: 2024-11-28 | End: 2024-11-29

## 2024-11-28 RX ORDER — ECHINACEA PURPUREA EXTRACT 125 MG
1 TABLET ORAL
Status: DISCONTINUED | OUTPATIENT
Start: 2024-11-28 | End: 2024-11-29

## 2024-11-28 RX ORDER — ONDANSETRON 2 MG/ML
4 INJECTION INTRAMUSCULAR; INTRAVENOUS EVERY 6 HOURS PRN
Status: CANCELLED | OUTPATIENT
Start: 2024-11-28

## 2024-11-28 RX ORDER — METOCLOPRAMIDE HYDROCHLORIDE 5 MG/ML
5 INJECTION INTRAMUSCULAR; INTRAVENOUS EVERY 8 HOURS PRN
Status: DISCONTINUED | OUTPATIENT
Start: 2024-11-28 | End: 2024-11-29

## 2024-11-28 RX ORDER — PANTOPRAZOLE SODIUM 20 MG/1
20 TABLET, DELAYED RELEASE ORAL
Status: DISCONTINUED | OUTPATIENT
Start: 2024-11-29 | End: 2024-11-29

## 2024-11-28 NOTE — PROGRESS NOTES
11/28/24 1135 11/28/24 1138 11/28/24 1141   Vital Signs   Pulse 66 71 78   Heart Rate Source Monitor Monitor Monitor   Resp 18 18 18   Respiratory Quality Normal Normal Normal   /75 (!) 166/86 (!) 170/93   MAP (mmHg) 99 (!) 108 (!) 116   BP Location Right arm Right arm Right arm   BP Method Automatic Automatic Automatic   Patient Position Lying Sitting Standing

## 2024-11-28 NOTE — PLAN OF CARE
Patient arrived to unit from [place] ED around approx 1130 this AM. Oriented to unit. Admission navigator complete. PTA medication list reviewed and completed. Patient alert and oriented x4. Family at bedside. Spo2 maintained on RA. NSR on tele. Cardiology to see patient. Denies chest pain at this time. Patient continent of bowel and bladder. Denies pain at this time. 2 person skin check completed with SRIKANTH Sahu. Patient is up independently in the room. Updated on POC. Needs met.         Problem: CARDIOVASCULAR - ADULT  Goal: Maintains optimal cardiac output and hemodynamic stability  Description: INTERVENTIONS:  - Monitor vital signs, rhythm, and trends  - Monitor for bleeding, hypotension and signs of decreased cardiac output  - Evaluate effectiveness of vasoactive medications to optimize hemodynamic stability  - Monitor arterial and/or venous puncture sites for bleeding and/or hematoma  - Assess quality of pulses, skin color and temperature  - Assess for signs of decreased coronary artery perfusion - ex. Angina  - Evaluate fluid balance, assess for edema, trend weights  Outcome: Progressing  Goal: Absence of cardiac arrhythmias or at baseline  Description: INTERVENTIONS:  - Continuous cardiac monitoring, monitor vital signs, obtain 12 lead EKG if indicated  - Evaluate effectiveness of antiarrhythmic and heart rate control medications as ordered  - Initiate emergency measures for life threatening arrhythmias  - Monitor electrolytes and administer replacement therapy as ordered  Outcome: Progressing     Problem: Patient/Family Goals  Goal: Patient/Family Long Term Goal  Description: Patient's Long Term Goal: Stay out of the hospital when discharged     Interventions:  - Attend follow up appointments as needed  - Follow medication regimen at home  - See additional Care Plan goals for specific interventions  Outcome: Progressing  Goal: Patient/Family Short Term Goal  Description: Patient's Short Term Goal: Go  home    Interventions:   - Tele monitoring   - Monitor labs   - Cardiology to see  - Pain management   - Trend troponin   - See additional Care Plan goals for specific interventions  Outcome: Progressing

## 2024-11-28 NOTE — ED PROVIDER NOTES
Patient Seen in: [facility] In [place]      History     Chief Complaint   Patient presents with    Chest Pain Angina     Stated Complaint: chest pain    Subjective:   HPI      Is a 74-year-old male who with chest pain.  He states for the past couple days he has had episodes of lightheadedness shakiness near syncope and diaphoresis.  Today patient was out working with horses and having intermittent left-sided chest pain.  Denies any pain now.  No previous history of coronary disease.  No other associate symptoms or complaints.    Objective:     Past Medical History:    Actinic keratosis    Anesthesia complication    Cancer (HCC)    skin cancers with surgery    Cataract    Elevated PSA, less than 10 ng/ml    Esophageal reflux    Hearing impairment    hearing aids    High blood pressure    High cholesterol    Hx of colonic polyp    Hyperlipidemia    Osteoarthritis    Unspecified essential hypertension    Visual impairment              Past Surgical History:   Procedure Laterality Date    Colonoscopy  2017    tics; repeat 5 yrs    Colonoscopy N/A 9/15/2023    Procedure: COLONOSCOPY with cold snare polypectomy;  Surgeon: Flcao Zavala DO;  Location:  ENDOSCOPY    Egd N/A 2017    Procedure: ESOPHAGOGASTRODUODENOSCOPY, COLONOSCOPY, POSSIBLE BIOPSY, POSSIBLE POLYPECTOMY 72378, 77833;  Surgeon: Maycol Hurd MD;  [place] [facility] [place] [facility]    Egd  2021    [place], [facility] NO dysplasia, rpt 5 yrs    Other surgical history  2023    mohs    Tonsillectomy      Upper gi endo poss barrtts  2017    [facility]; HH- repeat 3 yr (no dysplasia)                Social History     Socioeconomic History    Marital status:    Tobacco Use    Smoking status: Former     Current packs/day: 0.00     Types: Cigarettes     Quit date: 1968     Years since quittin.9    Smokeless tobacco: Never    Tobacco comments:     briefly in army, 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   Constitutional:       General: He is not in acute distress.     Appearance: He is well-developed. He is not toxic-appearing.   HENT:      Head: Normocephalic and atraumatic.   Eyes:      General: No scleral icterus.     Conjunctiva/sclera: Conjunctivae normal.   Cardiovascular:      Rate and Rhythm: Normal rate.      Heart sounds: Normal heart sounds.   Pulmonary:      Effort: Pulmonary effort is normal. No respiratory distress.      Breath sounds: Normal breath sounds.   Abdominal:      General: There is no distension.   Musculoskeletal:         General: No tenderness. Normal range of motion.      Cervical back: Normal range of motion and neck supple.      Right lower leg: No edema.      Left lower leg: No edema.   Skin:     General: Skin is warm and dry.      Findings: No rash.   Neurological:      Mental Status: He is alert and oriented to person, place, and time.      Motor: No abnormal muscle tone.      Coordination: Coordination normal.   Psychiatric:         Behavior: Behavior normal.            ED Course     Labs Reviewed   POCT ISTAT CHEM8 CARTRIDGE - Abnormal; Notable for the following components:       Result Value    ISTAT Glucose 120 (*)     All other components within normal limits   ISTAT TROPONIN - Normal   CBC WITH DIFFERENTIAL WITH PLATELET   COMP METABOLIC PANEL (14)   TROPONIN I HIGH SENSITIVITY   RAINBOW DRAW LAVENDER   RAINBOW DRAW LIGHT GREEN   RAINBOW DRAW BLUE      EKG    Rate, intervals and axes as noted on EKG Report.  Rate: 87  Rhythm: Sinus Rhythm  Reading: Normal sinus rhythm right bundle branch block.  No ischemia                        MDM                   -Comorbidities did add complexity to the management are mentioned in the HPI above        -I personally reviewed the prior external notes and the medical record to obtain additional history I reviewed nuclear stress test in November 2021.  Patient had normal study.        -DDX: Includes but not limited to near syncope, dehydration, acute coronary syndrome which is a medical condition that can pose a threat to life/function        -I personally reviewed the radiographs findings and they show- NAD   Please refer to radiology report for official interpretation      Labs Reviewed   POCT ISTAT CHEM8 CARTRIDGE - Abnormal; Notable for the following components:       Result Value    ISTAT Glucose 120 (*)     All other components within normal limits   ISTAT TROPONIN - Normal   CBC WITH DIFFERENTIAL WITH PLATELET   COMP METABOLIC PANEL (14)   TROPONIN I HIGH SENSITIVITY   RAINBOW DRAW LAVENDER   RAINBOW DRAW LIGHT GREEN   RAINBOW DRAW BLUE     Laboratory workup reassuring thus far.  Pain-free in the ED.  Discussed with cardiology and hospitalist given his symptomatology will be admitted for further care     Admission disposition: 11/28/2024 10:04 AM           Medical Decision Making      Disposition and Plan     Clinical Impression:  1. Acute chest pain         Disposition:  Admit  11/28/2024 10:04 am    Follow-up:  No follow-up provider specified.        Medications Prescribed:  Current Discharge Medication List              Supplementary Documentation:         Hospital Problems       Present on Admission  Date Reviewed: 7/9/2024            ICD-10-CM Noted POA    * (Principal) Acute chest pain R07.9 11/28/2024 Unknown

## 2024-11-28 NOTE — H&P
[facility]     Omega Conway Patient Status:  Observation    1950 MRN [id number]   Location [facility] 2NE-A Attending Qamar, René Mar, *   Hosp Day # 0 PCP Ingris Hays MD     Chief Complaint: Chest pain    Subjective:    History of Present Illness:   Omega Conway is a 74 year old male with PMHx hypertension, hyperlipidemia, GERD who presents to the hospital with chest pain. Patient is active with his horses and over the past few days, he has noticed weakness, shakiness, and diaphoresis whenever he is active. Wife is an RN and she noticed an irregular pulse but he was not tachycardic and BP was WNL. Patient did feel some palpitations. Today, he noticed left-sided chest pain without radiation. It resolved without intervention. He denies SOB, nausea or vomiting. No diarrhea. No cough or flu-like symptoms. He has chronic sinus issues and this has not changed. He states he follows up with Dr. Warren and had an event monitor for a month a few years ago which was unremarkable per patient. He was told he needed a stress test this year. He is a non-smoker. Brother had a triple bypass 5 years ago. His other brother and sister have stents. Currently, he has no symptoms. He was given  mg as he took his baby ASA this morning. In the ER, EKG without acute ischemic changes. CXR unremarkable. Troponin negative.    History/Other:    Past Medical History:  Past Medical History:    Actinic keratosis    Anesthesia complication    Cancer (HCC)    skin cancers with surgery    Cataract    Elevated PSA, less than 10 ng/ml    Esophageal reflux    Hearing impairment    hearing aids    High blood pressure    High cholesterol    Hx of colonic polyp    Hyperlipidemia    Osteoarthritis    Unspecified essential hypertension    Visual impairment     Past Surgical History:   Past Surgical History:   Procedure Laterality Date    Colonoscopy  2017    tics; repeat 5 yrs     Colonoscopy N/A 9/15/2023    Procedure: COLONOSCOPY with cold snare polypectomy;  Surgeon: Flaco Zavala DO;  Location:  ENDOSCOPY    Egd N/A 07/20/2017    Procedure: ESOPHAGOGASTRODUODENOSCOPY, COLONOSCOPY, POSSIBLE BIOPSY, POSSIBLE POLYPECTOMY 07023, 22034;  Surgeon: Maycol Hurd MD;  Location: [facility] [place] [facility]    Egd  04/2021    [place], [facility] NO dysplasia, rpt 5 yrs    Other surgical history  02/2023    mohs    Tonsillectomy      Upper gi endo poss barrtts  07/2017    [facility]; HH- repeat 3 yr (no dysplasia)      Family History:   Family History   Problem Relation Age of Onset    Cancer Father         pancreatic    Diabetes Father     Cancer Mother         breast    Diabetes Mother     Cancer Sister         melanoma-cancer free    Heart Disease Neg     Stroke Neg      Social History:    reports that he quit smoking about 56 years ago. His smoking use included cigarettes. He has never used smokeless tobacco. He reports current alcohol use. He reports that he does not use drugs.     Allergies: Allergies[1]    Medications:  Medications Ordered Prior to Encounter[2]    Review of Systems:   A comprehensive review of systems was completed.    Pertinent positives and negatives noted in the HPI.    Objective:   Physical Exam:    BP (!) 170/93 (BP Location: Right arm)   Pulse 78   Temp 97.9 °F (36.6 °C) (Oral)   Resp 18   Ht 5' 11\" (1.803 m)   Wt 172 lb 9.9 oz (78.3 kg)   SpO2 96%   BMI 24.08 kg/m²   General: No acute distress, awake and alert  Respiratory: No rhonchi, no wheezes  Cardiovascular: S1, S2. Regular rate and rhythm  Abdomen: Soft, Non-tender, non-distended, positive bowel sounds  Neuro: No new focal deficits  Extremities: No edema    Results:    Labs:      Labs Last 24 Hours:  Recent Labs   Lab 11/28/24  0908   RBC 4.80   HGB 15.8   HCT 43.8   MCV 91.3   MCH 32.9   MCHC 36.1   RDW 12.7   NEPRELIM 4.80   WBC 7.5   .0     Recent Labs   Lab 11/28/24  0908 11/28/24  0939    *  --    BUN 13  --    CREATSERUM 1.18  --    EGFRCR 64 70   CA 10.3  --    ALB 4.8  --      --    K 3.8  --      --    CO2 25.0  --    ALKPHO 94  --    AST 16  --    ALT 14  --    BILT 1.2*  --    TP 7.1  --      No results found for: \"PT\", \"INR\"    Recent Labs   Lab 11/28/24  0908   TROPHS 8     No results for input(s): \"TROP\", \"PBNP\" in the last 168 hours.    No results for input(s): \"PCT\" in the last 168 hours.    Imaging: Imaging data reviewed in Epic.    Assessment & Plan:      #Chest pain associated with dizziness, weakness and diaphoresis  -Troponin negative, continue to trend  -EKG without acute ischemic changes  -CXR unremarkable  -Telemetry  -Continue home ASA, statin  -Cardiology consult    #Hypertension  -Lisinopril    #Hyperlipidemia  -Statin    #GERD  -PPI    #Sinus problems  -Continue home flonase    Plan of care discussed with patient, family, RN.    Rojas Foote, DO    Supplementary Documentation:     The 21st Century Cures Act makes medical notes like these available to patients in the interest of transparency. Please be advised this is a medical document. Medical documents are intended to carry relevant information, facts as evident, and the clinical opinion of the practitioner. The medical note is intended as peer to peer communication and may appear blunt or direct. It is written in medical language and may contain abbreviations or verbiage that are unfamiliar.                   [1]   Allergies  Allergen Reactions    Amoxicillin-Pot Clavulanate DIARRHEA and NAUSEA ONLY   [2]   No current facility-administered medications on file prior to encounter.     Current Outpatient Medications on File Prior to Encounter   Medication Sig Dispense Refill    FLUTICASONE PROPIONATE 50 MCG/ACT Nasal Suspension SPRAY 2 SPRAYS INTO EACH NOSTRIL ONCE DAILY 48 g 0    tadalafil 5 MG Oral Tab Take 1 tablet (5 mg total) by mouth daily. 90 tablet 0    atorvastatin 40 MG Oral Tab Take 1 tablet (40  mg total) by mouth nightly. 90 tablet 0    lisinopril 20 MG Oral Tab Take 1 tablet (20 mg total) by mouth daily. 90 tablet 0    omeprazole 20 MG Oral Capsule Delayed Release Take 1 capsule (20 mg total) by mouth every morning. 90 capsule 1    acidophilus-pectin Oral Cap Take 1 capsule by mouth daily.      Vitamin D, Cholecalciferol, 50 MCG (2000 UT) Oral Cap Take 2,000 Units by mouth daily.      omega-3 fatty acids 1000 MG Oral Cap Take 1,000 mg by mouth daily.      aspirin 81 MG Oral Chew Tab Chew 1 tablet (81 mg total) by mouth daily.      [] dicyclomine 20 MG Oral Tab Take 1 tablet (20 mg total) by mouth 4 (four) times daily as needed. 30 tablet 0

## 2024-11-28 NOTE — ED INITIAL ASSESSMENT (HPI)
Patient presents with left sided chest pain starting this morning. States for the past two days he has been experiencing episodes of weakness, feeling \"shaking,\" and getting sweaty.

## 2024-11-28 NOTE — CONSULTS
[facility] Cardiology Consultation    Omega Conway Patient Status:  Observation    1950 MRN [id number]   Location [facility] 2NE-A Attending Rojas Foote DO   Hosp Day # 0 PCP Ingris Hays MD     Reason for Consultation:  chest pain      History of Present Illness:  Omega Conway is a a(n) 74 year old male. He sees Dr. Warren for CAD (+CAC 2018 Ca++ score = 1800), Hypertension , Dyslipidemia .  He has a hx of near syncope several times in last 3 years. 30 day TTM, 2018: SR IVCD, PVC's. Possible spells of hypoglycemia in past    He reports 2 days of feeling weak, shaky, lightheadedness, sweaty, tired.  He noticed some chest tightness and pressure while feeding the horses. Some irregular heart beats felt by wife today.     History:  Past Medical History:    Actinic keratosis    Anesthesia complication    Cancer (HCC)    skin cancers with surgery    Cataract    Elevated PSA, less than 10 ng/ml    Esophageal reflux    Hearing impairment    hearing aids    High blood pressure    High cholesterol    Hx of colonic polyp    Hyperlipidemia    Osteoarthritis    Unspecified essential hypertension    Visual impairment     Past Surgical History:   Procedure Laterality Date    Colonoscopy  2017    tics; repeat 5 yrs    Colonoscopy N/A 9/15/2023    Procedure: COLONOSCOPY with cold snare polypectomy;  Surgeon: Flaco Zavala DO;  Location:  ENDOSCOPY    Egd N/A 2017    Procedure: ESOPHAGOGASTRODUODENOSCOPY, COLONOSCOPY, POSSIBLE BIOPSY, POSSIBLE POLYPECTOMY 21619, 07730;  Surgeon: Maycol Hurd MD;  Location: [facility] [place] [facility]GICAL [facility]    Egd  2021    Obey [facility] NO dysplasia, rpt 5 yrs    Other surgical history  2023    mohs    Tonsillectomy      Upper gi endo poss barrtts  2017    [facility]; HH- repeat 3 yr (no dysplasia)     Family History   Problem Relation Age of Onset    Cancer Father         pancreatic    Diabetes Father     Cancer Mother         breast    Diabetes  Mother     Cancer Sister         melanoma-cancer free    Heart Disease Neg     Stroke Neg          Allergies:  Allergies[1]    Medications:   aspirin  81 mg Oral Daily    atorvastatin  40 mg Oral Nightly    lisinopril  20 mg Oral Daily    tadalafil  5 mg Oral Daily    Vitamin D (Cholecalciferol)  2,000 Units Oral Daily       Continuous Infusions:      Social History:   reports that he quit smoking about 56 years ago. His smoking use included cigarettes. He has never used smokeless tobacco. He reports current alcohol use. He reports that he does not use drugs.    Review of Systems:  All systems were reviewed and are negative except as described above in HPI.    Physical Exam:      Wt Readings from Last 3 Encounters:   11/28/24 172 lb 9.9 oz (78.3 kg)   07/09/24 176 lb (79.8 kg)   06/21/24 174 lb 3.2 oz (79 kg)       Vitals:    11/28/24 1135 11/28/24 1138 11/28/24 1141 11/28/24 1239   BP: 156/75 (!) 166/86 (!) 170/93    BP Location: Right arm Right arm Right arm    Pulse: 66 71 78    Resp: 18 18 18    Temp: 97.9 °F (36.6 °C)      TempSrc: Oral      SpO2: 97% 97% 96%    Weight:   172 lb 9.9 oz (78.3 kg) 172 lb 9.9 oz (78.3 kg)   Height:           Temp:  [97.9 °F (36.6 °C)-98.6 °F (37 °C)] 97.9 °F (36.6 °C)  Pulse:  [66-86] 78  Resp:  [14-18] 18  BP: (155-173)/(75-95) 170/93  SpO2:  [96 %-100 %] 96 %    Temp: 97.9 °F (36.6 °C)  Pulse: 78  Resp: 18  BP: 170/93      General:  Appears comfortable  HEENT: No focal deficits.  Neck: No JVD, carotids 2+ no bruits.  Cardiac: Regular S1S2.  No S3, S4, rub, click.  No murmur.  Lungs: Clear to auscultation and percussion.  Abdomen: Soft, non-tender.   Extremities: No LE edema.  No clubbing or cyanosis  Neurologic: Alert and oriented, normal affect.  Skin: Warm and dry.     Labs:      HEM:  Recent Labs   Lab 11/28/24  0908   WBC 7.5   HGB 15.8   HCT 43.8   .0       Chem:  Recent Labs   Lab 11/28/24  0908      K 3.8      CO2 25.0   BUN 13   CREATSERUM 1.18   ALT  14   AST 16   ALB 4.8       No results for input(s): \"INR\" in the last 168 hours.                  Lab Results   Component Value Date    TROP <0.046 07/15/2018    TROP <0.046 07/14/2018         Invalid input(s): \"PBNPML\"                 Telemetry: SR    Laboratories and Data:  Diagnostics:    EKG, 11/28/2024:  NSR, RBBB    CXR, 11/28/2024:         FINDINGS:  Heart size is within normal limits.  Pleural spaces appear clear.  Mediastinal and hilar contours are normal.  No focal consolidation.  If clinical symptoms persist then recommend follow-up imaging.       Impression:    1.  Weakness, shakiness, lightheadedness. Consider arrhythmias, hypoglycemia  2. Chest pain today.  Need to consider angina given high risk profile  3. CAD.  Ca++ score was 1800 2018    4. Hypertension     5. Dyslipidemia        Recommend:    1.  Telemetry  2. Serial troponins  3. Echo  4. Stress Cardiolyte Study in am  5. May benefir from home monitoring: [employer] mobile and/or glucometer          Kristopher Mei MD  11/28/2024  1:03 PM         [1]   Allergies  Allergen Reactions    Amoxicillin-Pot Clavulanate DIARRHEA and NAUSEA ONLY      Normal

## 2024-11-29 ENCOUNTER — APPOINTMENT (OUTPATIENT)
Dept: CV DIAGNOSTICS | Facility: HOSPITAL | Age: 74
End: 2024-11-29
Attending: INTERNAL MEDICINE
Payer: MEDICARE

## 2024-11-29 VITALS
SYSTOLIC BLOOD PRESSURE: 150 MMHG | HEART RATE: 78 BPM | OXYGEN SATURATION: 95 % | HEIGHT: 71 IN | RESPIRATION RATE: 19 BRPM | BODY MASS INDEX: 24.17 KG/M2 | TEMPERATURE: 99 F | WEIGHT: 172.63 LBS | DIASTOLIC BLOOD PRESSURE: 80 MMHG

## 2024-11-29 LAB
ATRIAL RATE: 87 BPM
GLUCOSE BLD-MCNC: 118 MG/DL (ref 70–99)
P AXIS: 50 DEGREES
P-R INTERVAL: 186 MS
Q-T INTERVAL: 392 MS
QRS DURATION: 136 MS
QTC CALCULATION (BEZET): 471 MS
R AXIS: 56 DEGREES
T AXIS: 9 DEGREES
TROPONIN I SERPL HS-MCNC: 7 NG/L
VENTRICULAR RATE: 87 BPM

## 2024-11-29 PROCEDURE — 93306 TTE W/DOPPLER COMPLETE: CPT | Performed by: INTERNAL MEDICINE

## 2024-11-29 PROCEDURE — 78452 HT MUSCLE IMAGE SPECT MULT: CPT | Performed by: INTERNAL MEDICINE

## 2024-11-29 PROCEDURE — 93018 CV STRESS TEST I&R ONLY: CPT | Performed by: INTERNAL MEDICINE

## 2024-11-29 PROCEDURE — 93017 CV STRESS TEST TRACING ONLY: CPT | Performed by: INTERNAL MEDICINE

## 2024-11-29 PROCEDURE — 99239 HOSP IP/OBS DSCHRG MGMT >30: CPT | Performed by: INTERNAL MEDICINE

## 2024-11-29 RX ORDER — LANCETS 33 GAUGE
EACH MISCELLANEOUS
Qty: 100 EACH | Refills: 6 | Status: SHIPPED | OUTPATIENT
Start: 2024-11-29

## 2024-11-29 RX ORDER — BLOOD SUGAR DIAGNOSTIC
STRIP MISCELLANEOUS
Qty: 100 STRIP | Refills: 6 | Status: SHIPPED | OUTPATIENT
Start: 2024-11-29

## 2024-11-29 RX ORDER — BLOOD-GLUCOSE METER
EACH MISCELLANEOUS
Qty: 1 KIT | Refills: 1 | Status: SHIPPED | OUTPATIENT
Start: 2024-11-29

## 2024-11-29 NOTE — PLAN OF CARE
Stress test results as reported by Radiology:     EF 52 %    Perfusion: No perfusion abnormalities    Wall motion: No wall motion abnormalities     Results reported to ordering provider for continuation of care.

## 2024-11-29 NOTE — PLAN OF CARE
PT A&OX4, on RA  NSR on tele. Denied CP. Denied SOB.   Continent x2  Independent ambulate in the room. Call light within reach, denied bed alarm, instruct use call light as need for help.  Updated POC to pt, all needs meet at this time.     POC:   Stress test  Echo    Problem: CARDIOVASCULAR - ADULT  Goal: Maintains optimal cardiac output and hemodynamic stability  Description: INTERVENTIONS:  - Monitor vital signs, rhythm, and trends  - Monitor for bleeding, hypotension and signs of decreased cardiac output  - Evaluate effectiveness of vasoactive medications to optimize hemodynamic stability  - Monitor arterial and/or venous puncture sites for bleeding and/or hematoma  - Assess quality of pulses, skin color and temperature  - Assess for signs of decreased coronary artery perfusion - ex. Angina  - Evaluate fluid balance, assess for edema, trend weights  Outcome: Progressing  Goal: Absence of cardiac arrhythmias or at baseline  Description: INTERVENTIONS:  - Continuous cardiac monitoring, monitor vital signs, obtain 12 lead EKG if indicated  - Evaluate effectiveness of antiarrhythmic and heart rate control medications as ordered  - Initiate emergency measures for life threatening arrhythmias  - Monitor electrolytes and administer replacement therapy as ordered  Outcome: Progressing     Problem: Patient/Family Goals  Goal: Patient/Family Long Term Goal  Description: Patient's Long Term Goal: Stay out of the hospital when discharged     Interventions:  - Attend follow up appointments as needed  - Follow medication regimen at home  - See additional Care Plan goals for specific interventions  Outcome: Progressing  Goal: Patient/Family Short Term Goal  Description: Patient's Short Term Goal: Go home    Interventions:   - Tele monitoring   - Monitor labs   - Cardiology to see  - Pain management   - Trend troponin   - See additional Care Plan goals for specific interventions  Outcome: Progressing

## 2024-11-29 NOTE — DISCHARGE SUMMARY
[facility]OSPITAL[facility]  DISCHARGE SUMMARY     Omega Conway Patient Status:  Observation    1950 MRN [id number]   Location [facility] 2NE-A Attending Rojas Foote,    Hosp Day # 0 PCP Ingris Hays MD     Date of Admission: 2024  Date of Discharge: 2024  Discharge Disposition: Home or Self Care    Discharge Diagnosis:   #Chest pain, resolved   #Hypertension  #Hyperlipidemia  #GERD  #Chronic sinus problems    History of Present Illness: Omega Conway is a 74 year old male with PMHx hypertension, hyperlipidemia, GERD who presents to the hospital with chest pain. Patient is active with his horses and over the past few days, he has noticed weakness, shakiness, and diaphoresis whenever he is active. Wife is an RN and she noticed an irregular pulse but he was not tachycardic and BP was WNL. Patient did feel some palpitations. Today, he noticed left-sided chest pain without radiation. It resolved without intervention. He denies SOB, nausea or vomiting. No diarrhea. No cough or flu-like symptoms. He has chronic sinus issues and this has not changed. He states he follows up with Dr. Warren and had an event monitor for a month a few years ago which was unremarkable per patient. He was told he needed a stress test this year. He is a non-smoker. Brother had a triple bypass 5 years ago. His other brother and sister have stents. Currently, he has no symptoms. He was given  mg as he took his baby ASA this morning. In the ER, EKG without acute ischemic changes. CXR unremarkable. Troponin negative.     Brief Synopsis: Serial troponin negative. Echo showed pEF and no wall motion abnormalities. Diastolic function parameters normal. Stress test negative. He was discharged home in stable condition.     Lace+ Score: 52  59-90 High Risk  29-58 Medium Risk  0-28   Low Risk       TCM Follow-Up Recommendation:  LACE 29-58: Moderate Risk of readmission after discharge from the hospital.    Procedures  during hospitalization:   Stress test    Incidental or significant findings and recommendations (brief descriptions):  None     Lab/Test results pending at Discharge:   None    Consultants:  Cardiology    Discharge Medication List:     Discharge Medications        START taking these medications        Instructions Prescription details   OneTouch Delica Lancets 33G Misc      Test blood sugar as needed for symptoms of hypoglycemia.   Quantity: 100 each  Refills: 6     OneTouch Verio Flex System w/Device Kit      Test blood sugar as needed for symptoms of hypoglycemia   Quantity: 1 kit  Refills: 1     OneTouch Verio Strp      Test blood sugar as needed for symptoms of hypoglycemia.   Quantity: 100 strip  Refills: 6            CONTINUE taking these medications        Instructions Prescription details   acidophilus-pectin Caps  Commonly known as: Probiotic      Take 1 capsule by mouth daily.   Refills: 0     aspirin 81 MG Chew      Chew 1 tablet (81 mg total) by mouth daily.   Refills: 0     atorvastatin 40 MG Tabs  Commonly known as: Lipitor      Take 1 tablet (40 mg total) by mouth nightly.   Quantity: 90 tablet  Refills: 0     fluticasone propionate 50 MCG/ACT Susp  Commonly known as: Flonase      SPRAY 2 SPRAYS INTO EACH NOSTRIL ONCE DAILY   Quantity: 48 g  Refills: 0     lisinopril 20 MG Tabs  Commonly known as: Prinivil; Zestril      Take 1 tablet (20 mg total) by mouth daily.   Quantity: 90 tablet  Refills: 0     omega-3 fatty acids 1000 MG Caps  Commonly known as: Fish Oil      Take 1,000 mg by mouth daily.   Refills: 0     omeprazole 20 MG Cpdr  Commonly known as: PriLOSEC      Take 1 capsule (20 mg total) by mouth every morning.   Quantity: 90 capsule  Refills: 1     tadalafil 5 MG Tabs  Commonly known as: CIALIS      Take 1 tablet (5 mg total) by mouth daily.   Quantity: 90 tablet  Refills: 0     Vitamin D (Cholecalciferol) 50 MCG (2000 UT) Caps      Take 2,000 Units by mouth daily.   Refills: 0             STOP taking these medications      dicyclomine 20 MG Tabs  Commonly known as: Bentyl                  Where to Get Your Medications        These medications were sent to [id number] - [place], [place] - [address] [phone], [phone]  [address], [facility] [address]      Phone: [phone]   OneTouch Delica Lancets 33G Misc  OneTouch Verio Flex System w/Device Kit  OneTouch Verio Strp         [place] No controlled substances prescribed at discharge.    Follow-up appointment:   Ingris Hays MD  [address] [address] [phone]    Follow up in 1 week(s)      Appointments for Next 30 Days 11/29/2024 - 12/29/2024      None            Vital signs:  Temp:  [97.5 °F (36.4 °C)-98.5 °F (36.9 °C)] 98.5 °F (36.9 °C)  Pulse:  [67-83] 78  Resp:  [17-22] 19  BP: (118-152)/(62-91) 150/80  SpO2:  [91 %-96 %] 95 %    Physical Exam:    See progress note dated same day.  -----------------------------------------------------------------------------------------------  PATIENT DISCHARGE INSTRUCTIONS: See electronic chart    Rojas Foote DO    Time spent:  32 minutes

## 2024-11-29 NOTE — PLAN OF CARE
Pt cleared to discharge per hospitalist and consults. Discharge AVS including medication information, follow-ups, and prescriptions given. Education material and demonstration provided on how to check blood sugar at home. Patient and family verbalized understanding and are agreeable with discharge. IV removed, telemetry discontinued. All belongings taken with patient. Transported off unit via wheelchair.     Problem: CARDIOVASCULAR - ADULT  Goal: Maintains optimal cardiac output and hemodynamic stability  Description: INTERVENTIONS:  - Monitor vital signs, rhythm, and trends  - Monitor for bleeding, hypotension and signs of decreased cardiac output  - Evaluate effectiveness of vasoactive medications to optimize hemodynamic stability  - Monitor arterial and/or venous puncture sites for bleeding and/or hematoma  - Assess quality of pulses, skin color and temperature  - Assess for signs of decreased coronary artery perfusion - ex. Angina  - Evaluate fluid balance, assess for edema, trend weights  11/29/2024 1727 by Mary Watters RN  Outcome: Completed  11/29/2024 1003 by Mary Watters RN  Outcome: Progressing  Goal: Absence of cardiac arrhythmias or at baseline  Description: INTERVENTIONS:  - Continuous cardiac monitoring, monitor vital signs, obtain 12 lead EKG if indicated  - Evaluate effectiveness of antiarrhythmic and heart rate control medications as ordered  - Initiate emergency measures for life threatening arrhythmias  - Monitor electrolytes and administer replacement therapy as ordered  11/29/2024 1727 by Mary Watters RN  Outcome: Completed  11/29/2024 1003 by Mary Watters RN  Outcome: Progressing     Problem: Patient/Family Goals  Goal: Patient/Family Long Term Goal  Description: Patient's Long Term Goal: Stay out of the hospital when discharged     Interventions:  - Attend follow up appointments as needed  - Follow medication regimen at home  - See additional Care Plan goals for specific  interventions  11/29/2024 1727 by Mary Watters, RN  Outcome: Completed  11/29/2024 1003 by Mary Watters, RN  Outcome: Progressing  Goal: Patient/Family Short Term Goal  Description: Patient's Short Term Goal: Go home    Interventions:   - Tele monitoring   - Monitor labs   - Cardiology to see  - Pain management   - Trend troponin   - See additional Care Plan goals for specific interventions  11/29/2024 1727 by Mary Watters, RN  Outcome: Completed  11/29/2024 1003 by Mary Watters, RN  Outcome: Progressing

## 2024-11-29 NOTE — PROGRESS NOTES
[facility]  Progress Note    Omega Mackenzieph Man Patient Status:  Observation    1950 MRN [id number]   Location [facility] 2NE-A Attending Rojas Foote,    Hosp Day # 0 PCP Ingris Hays MD     Subjective:   No further chest pain.  No further episodes of diaphoresis / fatigue.  No shortness of breath.  No focal cardiovascular complaints reported.    Objective:  Vitals:    24 2300 24 0530 24 0800 24 0932   BP: 118/62 144/71 141/84    BP Location: Right arm Right arm Right arm    Pulse: 71 67 79 83   Resp:     Temp: 97.6 °F (36.4 °C) 98.4 °F (36.9 °C) 97.8 °F (36.6 °C)    TempSrc: Oral Oral Oral    SpO2: 93% 96% 94% 94%   Weight:       Height:           Temp (24hrs), Av.8 °F (36.6 °C), Min:97.5 °F (36.4 °C), Max:98.4 °F (36.9 °C)      Medications:   Scheduled:    atorvastatin  40 mg Oral Nightly    fluticasone propionate  2 spray Nasal Nightly    pantoprazole  20 mg Oral QAM AC    aspirin  81 mg Oral Daily    lisinopril  20 mg Oral Daily    enoxaparin  40 mg Subcutaneous Daily    tadalafil  5 mg Oral Daily       Continuous Infusion:       PRN Medications:     acetaminophen    melatonin    polyethylene glycol (PEG 3350)    sennosides    bisacodyl    fleet enema    ondansetron    metoclopramide    benzonatate    guaiFENesin    glycerin-hypromellose-    sodium chloride    Intake/Output:     Intake/Output Summary (Last 24 hours) at 2024 1101  Last data filed at 2024 0932  Gross per 24 hour   Intake 850 ml   Output --   Net 850 ml       Wt Readings from Last 3 Encounters:   24 172 lb 9.9 oz (78.3 kg)   24 176 lb (79.8 kg)   24 174 lb 3.2 oz (79 kg)       Allergies:  Allergies[1]    Physical Exam:   General:  Well-developed / Well-nourished.  No acute distress.  HEENT:  Normocephalic.  Atraumatic.  No icterus.  Neck:  There is no jugular venous distention.   Cardiovascular:  Cardiovascular examination demonstrates a regular rate  and rhythm.  There is normal S1, S2.  There is no S3 or S4.  There are no murmurs, rubs, or gallops.  No click is appreciated.  PMI is nondisplaced with a normal apical impulse.    Pulmonary:  Lungs are clear to auscultation bilaterally.  There are no focal rales, rhonchi, or wheezes.  Good air movement is noted throughout both lung fields.   Abdomen:  The abdomen is soft, non-distended, and non-tender.  Bowel sounds are present and normoactive.  No organomegaly is appreciated.  Extremities:  Extremities do not demonstrate any evidence of peripheral edema.   No cyanosis or clubbing of the digits is appreciated.  Neurologic:  Alert and oriented.  Normal affect.  Integument:  No visible rashes are appreciated.      Laboratory/Data:   Recent Labs   Lab 11/28/24  0908   *   BUN 13   CREATSERUM 1.18   CA 10.3   ALB 4.8      K 3.8      CO2 25.0   ALKPHO 94   AST 16   ALT 14   BILT 1.2*   TP 7.1       Recent Labs   Lab 11/28/24  0908   RBC 4.80   HGB 15.8   HCT 43.8   MCV 91.3   MCH 32.9   MCHC 36.1   RDW 12.7   NEPRELIM 4.80   WBC 7.5   .0       No results for input(s): \"PTP\", \"INR\" in the last 168 hours.    No results for input(s): \"TROP\", \"CK\" in the last 168 hours.      Tele:     Diagnostics:    Echo: Pending  Stress Test: Pending      Assessment:   1. Episodes of fatigue and lightheadedness.                -event monitor without complex arrythmia in the past   -no findings on tele to account for Sx  2. Elevated coronary calcium score >1800 4/18  3. HTN                -controlled  4.  HL               -controlled  5. RBBB  6. NL LV function 7/18  7. Stress Cardiolite 7/18 with abn ECG response (limited by baseline ECG abn / RBBB) and normal perfusion.  Good functional capacity and no provoked Sx.  Repeat 11/21 with no ischemia  8.  PVD               -<15% R carotid stenosis and <50% L carotid stenosis               -Normal abdominal aortic size    Plan:    ASA   Statin   ACE I   Echo  pending   Stress Cardiolite pending   Consider ILR if above unremarkable.    Kale Warren MD  11/29/2024  11:01 AM         [1]   Allergies  Allergen Reactions    Amoxicillin-Pot Clavulanate DIARRHEA and NAUSEA ONLY

## 2024-11-29 NOTE — PLAN OF CARE
Assumed care of patient @ 0730 patient resting in bed, AOX4, reports no pain. Lung sounds clear bilaterally, O2 saturation adequate on room air. No complaints of shortness of breath or chest pain at this time. NSR on tele. Bowel sounds present and active in all quadrants, last bowel movement this morning per pt. Patient voiding without issue. Pt ambulating in mclean with steady gait.     Plan of Care: Echo/stress test pending.     Discussed plan of care with patient, daughter, verbalized understanding. Call light within reach.     Problem: CARDIOVASCULAR - ADULT  Goal: Maintains optimal cardiac output and hemodynamic stability  Description: INTERVENTIONS:  - Monitor vital signs, rhythm, and trends  - Monitor for bleeding, hypotension and signs of decreased cardiac output  - Evaluate effectiveness of vasoactive medications to optimize hemodynamic stability  - Monitor arterial and/or venous puncture sites for bleeding and/or hematoma  - Assess quality of pulses, skin color and temperature  - Assess for signs of decreased coronary artery perfusion - ex. Angina  - Evaluate fluid balance, assess for edema, trend weights  Outcome: Progressing  Goal: Absence of cardiac arrhythmias or at baseline  Description: INTERVENTIONS:  - Continuous cardiac monitoring, monitor vital signs, obtain 12 lead EKG if indicated  - Evaluate effectiveness of antiarrhythmic and heart rate control medications as ordered  - Initiate emergency measures for life threatening arrhythmias  - Monitor electrolytes and administer replacement therapy as ordered  Outcome: Progressing     Problem: Patient/Family Goals  Goal: Patient/Family Long Term Goal  Description: Patient's Long Term Goal: Stay out of the hospital when discharged     Interventions:  - Attend follow up appointments as needed  - Follow medication regimen at home  - See additional Care Plan goals for specific interventions  Outcome: Progressing  Goal: Patient/Family Short Term  Goal  Description: Patient's Short Term Goal: Go home    Interventions:   - Tele monitoring   - Monitor labs   - Cardiology to see  - Pain management   - Trend troponin   - See additional Care Plan goals for specific interventions  Outcome: Progressing

## 2024-11-29 NOTE — PROGRESS NOTES
[facility]   part of [facility]     Hospitalist Progress Note     Omega Conway Patient Status:  Observation    1950 MRN [id number]   Location [facility] 2NE-A Attending Rojas Foote,    Hosp Day # 0 PCP Ingris Hays MD     Chief Complaint: Chest pain    Subjective:     Patient has no complaints.     Objective:    Review of Systems:   A comprehensive review of systems was completed; pertinent positive and negatives stated in subjective.    Vital signs:  Temp:  [97.5 °F (36.4 °C)-98.6 °F (37 °C)] 98.4 °F (36.9 °C)  Pulse:  [66-86] 67  Resp:  [14-25] 17  BP: (118-170)/(62-93) 144/71  SpO2:  [91 %-100 %] 96 %    Physical Exam:    General: No acute distress, awake and alert  Respiratory: No rhonchi, no wheezes  Cardiovascular: S1, S2. Regular rate and rhythm  Abdomen: Soft, Non-tender, non-distended, positive bowel sounds  Extremities: No edema    Diagnostic Data:    Labs:  Recent Labs   Lab 24  0908   WBC 7.5   HGB 15.8   MCV 91.3   .0     Recent Labs   Lab 24  0908   *   BUN 13   CREATSERUM 1.18   CA 10.3   ALB 4.8      K 3.8      CO2 25.0   ALKPHO 94   AST 16   ALT 14   BILT 1.2*   TP 7.1     Estimated Creatinine Clearance: 56.7 mL/min (based on SCr of 1.18 mg/dL).    Recent Labs   Lab 24  1328 24  1755 24  0627   TROPHS 9 9 7     No results for input(s): \"PTP\", \"INR\" in the last 168 hours.     Microbiology  No results found for this visit on 24.    Imaging: Reviewed in Epic.    Medications:    atorvastatin  40 mg Oral Nightly    fluticasone propionate  2 spray Nasal Nightly    pantoprazole  20 mg Oral QAM AC    aspirin  81 mg Oral Daily    lisinopril  20 mg Oral Daily    enoxaparin  40 mg Subcutaneous Daily    tadalafil  5 mg Oral Daily       Assessment & Plan:      #Chest pain associated with dizziness, weakness and diaphoresis  -Troponin negative x 4  -EKG without acute ischemic changes  -CXR  unremarkable  -Telemetry  -Continue home ASA, statin  -Cardiology consult, plan for echo and stress test today  -Wife believes symptoms may be from hypoglycemia. No hx of DM. Glucometer ordered for discharge. Consideration of event monitor on discharge     #Hypertension  -Lisinopril     #Hyperlipidemia  -Statin     #GERD  -PPI     #Sinus problems  -Continue home shawanda Foote DO    Supplementary Documentation:     Quality:  DVT Mechanical Prophylaxis:   SCDs,    DVT Pharmacologic Prophylaxis   Medication    enoxaparin (Lovenox) 40 MG/0.4ML SUBQ injection 40 mg      DVT Pharmacologic prophylaxis: Aspirin 162 mg       Code Status: Full Code  Guerrero: No urinary catheter in place  DWIGHT: 0-1 day    Discharge is dependent on: Clinical state, cardiology recs  At this point Mr. Conway is expected to be discharge to: Home    The 21st Century Cures Act makes medical notes like these available to patients in the interest of transparency. Please be advised this is a medical document. Medical documents are intended to carry relevant information, facts as evident, and the clinical opinion of the practitioner. The medical note is intended as peer to peer communication and may appear blunt or direct. It is written in medical language and may contain abbreviations or verbiage that are unfamiliar.

## 2024-11-30 DIAGNOSIS — I45.10 RBBB: ICD-10-CM

## 2024-11-30 DIAGNOSIS — R44.8 FACIAL PRESSURE: ICD-10-CM

## 2024-11-30 DIAGNOSIS — E78.00 PURE HYPERCHOLESTEROLEMIA: ICD-10-CM

## 2024-11-30 DIAGNOSIS — I73.9 PVD (PERIPHERAL VASCULAR DISEASE) (HCC): ICD-10-CM

## 2024-11-30 DIAGNOSIS — R42 EPISODIC LIGHTHEADEDNESS: ICD-10-CM

## 2024-11-30 DIAGNOSIS — I25.10 ATHEROSCLEROSIS OF NATIVE CORONARY ARTERY OF NATIVE HEART, UNSPECIFIED WHETHER ANGINA PRESENT: ICD-10-CM

## 2024-11-30 DIAGNOSIS — I25.10 ATHEROSCLEROSIS OF NATIVE CORONARY ARTERY OF NATIVE HEART: ICD-10-CM

## 2024-11-30 DIAGNOSIS — I10 ESSENTIAL HYPERTENSION: ICD-10-CM

## 2024-11-30 DIAGNOSIS — R93.1 ABNORMAL HEART SCORE CT: ICD-10-CM

## 2024-11-30 DIAGNOSIS — H65.93 MIDDLE EAR EFFUSION, BILATERAL: ICD-10-CM

## 2024-11-30 RX ORDER — ATORVASTATIN CALCIUM 40 MG/1
40 TABLET, FILM COATED ORAL NIGHTLY
Qty: 90 TABLET | Refills: 0 | OUTPATIENT
Start: 2024-11-30

## 2024-11-30 NOTE — TELEPHONE ENCOUNTER
Last visit in this office on 6/21/2024 with DONTE Cameron      Patient admitted to hospital on 11/28/2024 - discharged 11/29/2024    No future appointments.    Will need hospital follow-up

## 2024-12-02 RX ORDER — TADALAFIL 5 MG/1
5 TABLET ORAL DAILY
Qty: 90 TABLET | Refills: 0 | Status: SHIPPED | OUTPATIENT
Start: 2024-12-02

## 2024-12-02 RX ORDER — ATORVASTATIN CALCIUM 40 MG/1
40 TABLET, FILM COATED ORAL NIGHTLY
Qty: 90 TABLET | Refills: 0 | Status: SHIPPED | OUTPATIENT
Start: 2024-12-02

## 2024-12-02 RX ORDER — LISINOPRIL 20 MG/1
20 TABLET ORAL DAILY
Qty: 90 TABLET | Refills: 0 | Status: SHIPPED | OUTPATIENT
Start: 2024-12-02

## 2024-12-03 ENCOUNTER — TELEPHONE (OUTPATIENT)
Dept: FAMILY MEDICINE CLINIC | Facility: CLINIC | Age: 74
End: 2024-12-03

## 2024-12-03 ENCOUNTER — PATIENT OUTREACH (OUTPATIENT)
Dept: CASE MANAGEMENT | Age: 74
End: 2024-12-03

## 2024-12-03 DIAGNOSIS — R07.9 ACUTE CHEST PAIN: Primary | ICD-10-CM

## 2024-12-03 DIAGNOSIS — Z02.9 ENCOUNTERS FOR ADMINISTRATIVE PURPOSES: ICD-10-CM

## 2024-12-03 PROCEDURE — 1111F DSCHRG MED/CURRENT MED MERGE: CPT

## 2024-12-03 PROCEDURE — 1159F MED LIST DOCD IN RCRD: CPT

## 2024-12-03 NOTE — PROGRESS NOTES
Transitional Care Management   Discharge Date: 24  Contact Date: 12/3/2024    Assessment:  TCM Initial Assessment    General:  Assessment completed with: Patient  Patient Subjective: I'm seeing Dr. Warren on . I'm feeling fine so far.  Chief Complaint: Acute Chest Pain  Verify patient name and  with patient/ caregiver: Yes    Hospital Stay/Discharge:  Tell me what you understand of why you were in the hospital or emergency department: chest pain  Prior to leaving the hospital were your Discharge Instructions reviewed with you?: Yes  Did you receive a copy of your written Discharge Instructions?: Yes  What questions do you have about your Discharge Instructions?: none  Do you feel better or worse since you left the hospital or emergency department?: Better    Follow - Up Appointment:  Do you have a follow-up appointment?: Yes  Date: 24  Physician: Dr. Warren  Are there any barriers to getting to your follow-up appointment?: No    Home Health/DME:  Prior to leaving the hospital was Home Health (HH) arranged for you?: No     Prior to leaving the hospital or emergency department was Durable Medical Equipment (DME), medical supplies, or infusions arranged for you?: No  Are DME/medical supply/infusions needs identified by staff during this assessment?: No     Medications/Diet:  Did any of your medications change, during or after your hospital stay or ED visit?: No  Do you understand what your medications are for and possible side effects?: Yes  Are there any reasons that keep you from taking your medication as prescribed?: No  Any concerns about medication refills?: No    Were you given a different diet per your Discharge Instructions?: No     Questions/Concerns:  Do you have any questions or concerns that have not been discussed?: No          Nursing Interventions: [facility] s/w patient for HFU. He states that he is feeling fine and so far has not had any symptoms since being discharged. He states that he  just picked up the blood glucose meter and supplies yesterday and has not checked his bs or bp yet. He denied having any fever, n/v/c/d, sob, pain, lightheadedness, HA or any other symptoms. Med review completed. He denied having any questions or concerns at this time.     Medication Review:   Current Outpatient Medications   Medication Sig Dispense Refill    TADALAFIL 5 MG Oral Tab TAKE ONE TABLET BY MOUTH DAILY 90 tablet 0    LISINOPRIL 20 MG Oral Tab TAKE ONE TABLET BY MOUTH DAILY 90 tablet 0    atorvastatin 40 MG Oral Tab Take 1 tablet (40 mg total) by mouth nightly. 90 tablet 0    Blood Glucose Monitoring Suppl (ONETOUCH VERIO FLEX SYSTEM) w/Device Does not apply Kit Test blood sugar as needed for symptoms of hypoglycemia 1 kit 1    Glucose Blood (ONETOUCH VERIO) In Vitro Strip Test blood sugar as needed for symptoms of hypoglycemia. 100 strip 6    OneTouch Delica Lancets 33G Does not apply Misc Test blood sugar as needed for symptoms of hypoglycemia. 100 each 6    FLUTICASONE PROPIONATE 50 MCG/ACT Nasal Suspension SPRAY 2 SPRAYS INTO EACH NOSTRIL ONCE DAILY 48 g 0    omeprazole 20 MG Oral Capsule Delayed Release Take 1 capsule (20 mg total) by mouth every morning. 90 capsule 1    acidophilus-pectin Oral Cap Take 1 capsule by mouth daily.      Vitamin D, Cholecalciferol, 50 MCG (2000 UT) Oral Cap Take 2,000 Units by mouth daily.      omega-3 fatty acids 1000 MG Oral Cap Take 1,000 mg by mouth daily.      aspirin 81 MG Oral Chew Tab Chew 1 tablet (81 mg total) by mouth daily.       Did patient review medications using current pill bottles and not just a medication list?  No  Discharge medications reviewed/discussed/and reconciled against outpatient medications with patient.  Any changes or updates to medications sent to primary care provider.  Patient Acknowledged    SDOH:   Transportation Needs: No Transportation Needs (11/28/2024)    Transportation Needs     Lack of Transportation: No     Car Seat: Not on file      Financial Resource Strain: Low Risk  (12/3/2024)    Financial Resource Strain     Difficulty of Paying Living Expenses: Not hard at all     Med Affordability: No         Follow-up Appointments:      Transitional Care Clinic  Was TCC Ordered: No      Primary Care Provider (If no TCC appointment)  Does patient already have a PCP appointment scheduled? No  Nurse Care Manager Attempted to schedule PCP office TCM appointment with patient   -If no appointment scheduled: Explain -pt declined, NCM sent TE to PCP Office.     Specialist  Does the patient have any other follow-up appointment(s) that need to be scheduled? Yes   -If yes: Nurse Care Manager reviewed upcoming specialist appointments with patient: Yes   -Does the patient need assistance scheduling appointment(s): No, pt has appt with Dr. Warren on 12/13/24    CCM referral placed:  No    Book By Date: 12/13/24

## 2024-12-03 NOTE — TELEPHONE ENCOUNTER
MI, Spoke to patient for TCM today.  Patient states that he is doing fine and declined an appt at this time.  TCM appointment recommended by 12/13/24 as patient is a Moderate risk for readmission.     BOOK BY DATE (last date for TCM): 12/13/24

## 2024-12-16 VITALS — SYSTOLIC BLOOD PRESSURE: 128 MMHG | DIASTOLIC BLOOD PRESSURE: 70 MMHG

## 2025-01-20 ENCOUNTER — TELEMEDICINE (OUTPATIENT)
Dept: FAMILY MEDICINE CLINIC | Facility: CLINIC | Age: 75
End: 2025-01-20
Payer: MEDICARE

## 2025-01-20 DIAGNOSIS — K21.00 GASTROESOPHAGEAL REFLUX DISEASE WITH ESOPHAGITIS: ICD-10-CM

## 2025-01-20 DIAGNOSIS — K22.70 BARRETT'S ESOPHAGUS WITHOUT DYSPLASIA: ICD-10-CM

## 2025-01-20 DIAGNOSIS — E78.00 PURE HYPERCHOLESTEROLEMIA: Primary | ICD-10-CM

## 2025-01-20 DIAGNOSIS — Z86.0100 HISTORY OF COLONIC POLYPS: ICD-10-CM

## 2025-01-20 DIAGNOSIS — R55 SYNCOPE, UNSPECIFIED SYNCOPE TYPE: ICD-10-CM

## 2025-01-20 PROCEDURE — 1160F RVW MEDS BY RX/DR IN RCRD: CPT | Performed by: FAMILY MEDICINE

## 2025-01-20 PROCEDURE — 99214 OFFICE O/P EST MOD 30 MIN: CPT | Performed by: FAMILY MEDICINE

## 2025-01-20 PROCEDURE — 1159F MED LIST DOCD IN RCRD: CPT | Performed by: FAMILY MEDICINE

## 2025-01-21 ENCOUNTER — LAB ENCOUNTER (OUTPATIENT)
Dept: LAB | Age: 75
End: 2025-01-21
Attending: FAMILY MEDICINE
Payer: MEDICARE

## 2025-01-21 DIAGNOSIS — E78.00 PURE HYPERCHOLESTEROLEMIA: ICD-10-CM

## 2025-01-21 LAB
EST. AVERAGE GLUCOSE BLD GHB EST-MCNC: 108 MG/DL (ref 68–126)
HBA1C MFR BLD: 5.4 % (ref ?–5.7)
TSI SER-ACNC: 0.61 UIU/ML (ref 0.55–4.78)

## 2025-01-21 PROCEDURE — 83036 HEMOGLOBIN GLYCOSYLATED A1C: CPT

## 2025-01-21 PROCEDURE — 36415 COLL VENOUS BLD VENIPUNCTURE: CPT

## 2025-01-21 PROCEDURE — 84443 ASSAY THYROID STIM HORMONE: CPT

## 2025-01-21 RX ORDER — OMEPRAZOLE 20 MG/1
20 CAPSULE, DELAYED RELEASE ORAL EVERY MORNING
Qty: 90 CAPSULE | Refills: 0 | Status: SHIPPED | OUTPATIENT
Start: 2025-01-21 | End: 2025-04-16

## 2025-01-21 NOTE — TELEPHONE ENCOUNTER
Omeprazole last refilled 7/22/24  No future appointment in office  LOV with Monse 7/9/24 for muscle cramps    Gastrointestional Medication Protocol [id number]/2025 10:05 PM   Protocol Details In person appointment or virtual visit in the past 12 mos or appointment in next 3 mos    Medication is active on med list

## 2025-01-21 NOTE — TELEPHONE ENCOUNTER
Future Appointments   Date Time Provider Department Center   2/25/2025  2:40 PM Ingris Hays MD [facility] [place]

## 2025-01-29 ENCOUNTER — HOSPITAL ENCOUNTER (OUTPATIENT)
Age: 75
Discharge: HOME | End: 2025-01-29
Payer: MEDICARE

## 2025-01-29 ENCOUNTER — APPOINTMENT (OUTPATIENT)
Dept: GENERAL RADIOLOGY | Age: 75
End: 2025-01-29
Attending: PHYSICIAN ASSISTANT
Payer: MEDICARE

## 2025-01-29 VITALS
DIASTOLIC BLOOD PRESSURE: 89 MMHG | RESPIRATION RATE: 18 BRPM | HEART RATE: 75 BPM | OXYGEN SATURATION: 99 % | HEIGHT: 71 IN | BODY MASS INDEX: 23.8 KG/M2 | SYSTOLIC BLOOD PRESSURE: 146 MMHG | TEMPERATURE: 98 F | WEIGHT: 170 LBS

## 2025-01-29 DIAGNOSIS — J40 SINOBRONCHITIS: Primary | ICD-10-CM

## 2025-01-29 DIAGNOSIS — J32.9 SINOBRONCHITIS: Primary | ICD-10-CM

## 2025-01-29 DIAGNOSIS — R05.1 ACUTE COUGH: ICD-10-CM

## 2025-01-29 PROCEDURE — 71046 X-RAY EXAM CHEST 2 VIEWS: CPT | Performed by: PHYSICIAN ASSISTANT

## 2025-01-29 PROCEDURE — 99214 OFFICE O/P EST MOD 30 MIN: CPT | Performed by: PHYSICIAN ASSISTANT

## 2025-01-29 RX ORDER — BENZONATATE 200 MG/1
200 CAPSULE ORAL 3 TIMES DAILY PRN
Qty: 20 CAPSULE | Refills: 0 | Status: SHIPPED | OUTPATIENT
Start: 2025-01-29

## 2025-01-29 RX ORDER — PREDNISONE 20 MG/1
40 TABLET ORAL DAILY
Qty: 10 TABLET | Refills: 0 | Status: SHIPPED | OUTPATIENT
Start: 2025-01-29 | End: 2025-02-03

## 2025-01-29 RX ORDER — DOXYCYCLINE 100 MG/1
100 CAPSULE ORAL 2 TIMES DAILY
Qty: 14 CAPSULE | Refills: 0 | Status: SHIPPED | OUTPATIENT
Start: 2025-01-29 | End: 2025-02-05

## 2025-01-29 NOTE — ED PROVIDER NOTES
Patient Seen in: [facility]      History     Chief Complaint   Patient presents with    Cough    Post Nasal Drip     Stated Complaint: cough    Subjective:   The history is provided by the patient.         74 male with PMG of HTN, HLD presents to the IC due to cough x 5 days.  Patient states he normally deals with persistent sinus congestion and postnasal drip.  Is not on daily allergy medication and Flonase which she has been taking.  Feels his congestion is worsening and now more thickening.  Associated postnasal drip.  Dry cough starting last night that is intensified over the last 4 to 5 days with some wheezing.  No fevers bodyaches or chills.  He has no history of asthma or smoking but a recurrent bronchitis.  No known sick contacts.    Objective:     Past Medical History:    Actinic keratosis    Anesthesia complication    Cancer (HCC)    skin cancers with surgery    Cataract    Elevated PSA, less than 10 ng/ml    Esophageal reflux    Hearing impairment    hearing aids    High blood pressure    High cholesterol    Hx of colonic polyp    Hyperlipidemia    Osteoarthritis    Unspecified essential hypertension    Visual impairment              Past Surgical History:   Procedure Laterality Date    Colonoscopy  07/2017    tics; repeat 5 yrs    Colonoscopy N/A 9/15/2023    Procedure: COLONOSCOPY with cold snare polypectomy;  Surgeon: Flaco Zavala DO;  Location:  ENDOSCOPY    Egd N/A 07/20/2017    Procedure: ESOPHAGOGASTRODUODENOSCOPY, COLONOSCOPY, POSSIBLE BIOPSY, POSSIBLE POLYPECTOMY 41231, [id number];  Surgeon: Maycol Hurd MD;  [facility]    Egd  04/2021    Obey Hurd's NO dysplasia, rpt 5 yrs    Other surgical history  02/2023    mohs    Tonsillectomy      Upper gi endo poss barrtts  07/2017    Barnhart's; HH- repeat 3 yr (no dysplasia)                Social History     Socioeconomic History    Marital status:    Tobacco Use    Smoking status: Former      Current packs/day: 0.00     Types: Cigarettes     Quit date: 1968     Years since quittin.1    Smokeless tobacco: Never    Tobacco comments:     briefly in army, 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   Vaping Use    Vaping status: Never Used   Substance and Sexual Activity    Alcohol use: Yes     Comment: 7 drinks/ week    Drug use: No     Social Drivers of Health     Financial Resource Strain: Low Risk  (12/3/2024)    Financial Resource Strain     Difficulty of Paying Living Expenses: Not hard at all     Med Affordability: No   Food Insecurity: No Food Insecurity (2024)    Food Insecurity     Food Insecurity: Never true   Transportation Needs: No Transportation Needs (2024)    Transportation Needs     Lack of Transportation: No   Housing Stability: Low Risk  (2024)    Housing Stability     Housing Instability: No              Review of Systems   Constitutional: Negative.    HENT:  Positive for congestion and postnasal drip. Negative for ear discharge, ear pain, sore throat, trouble swallowing and voice change.    Respiratory:  Positive for cough and wheezing. Negative for shortness of breath and stridor.    Cardiovascular: Negative.    Gastrointestinal: Negative.        Positive for stated complaint: cough  Other systems are as noted in HPI.  Constitutional and vital signs reviewed.      All other systems reviewed and negative except as noted above.    Physical Exam     ED Triage Vitals [25 1112]   /89   Pulse 75   Resp 18   Temp 98.1 °F (36.7 °C)   Temp src Oral   SpO2 99 %   O2 Device None (Room air)       Current Vitals:   Vital Signs  BP: 146/89  Pulse: 75  Resp: 18  Temp: 98.1 °F (36.7 °C)  Temp src: Oral    Oxygen Therapy  SpO2: 99 %  O2 Device: None (Room air)        Physical Exam  Vitals and nursing note reviewed.   Constitutional:       General: He is not in acute distress.     Appearance: Normal appearance. He is not toxic-appearing.   HENT:      Head: Normocephalic.      Right Ear: Tympanic  membrane, ear canal and external ear normal.      Left Ear: Tympanic membrane, ear canal and external ear normal.      Nose: Congestion present.      Mouth/Throat:      Mouth: Mucous membranes are moist.      Pharynx: No oropharyngeal exudate or posterior oropharyngeal erythema.      Comments: PND  Eyes:      Extraocular Movements: Extraocular movements intact.      Conjunctiva/sclera: Conjunctivae normal.      Pupils: Pupils are equal, round, and reactive to light.   Cardiovascular:      Rate and Rhythm: Normal rate and regular rhythm.   Pulmonary:      Effort: Pulmonary effort is normal.      Breath sounds: Normal breath sounds.      Comments: Wheezing Clears with cough  Musculoskeletal:      Cervical back: Normal range of motion.   Lymphadenopathy:      Cervical: No cervical adenopathy.   Skin:     General: Skin is warm.   Neurological:      General: No focal deficit present.      Mental Status: He is alert and oriented to person, place, and time.   Psychiatric:         Mood and Affect: Mood normal.         Behavior: Behavior normal.             ED Course   Labs Reviewed - No data to display       XR CHEST PA + LAT CHEST (CPT=71046)    Result Date: 1/29/2025  PROCEDURE:  XR CHEST PA + LAT CHEST (CPT=71046)  INDICATIONS:  cough  COMPARISON:  None.  TECHNIQUE:  PA and lateral chest radiographs were obtained.  PATIENT STATED HISTORY: (As transcribed by Technologist)  Patient with cough and congestion.    FINDINGS:  LUNGS:  No focal consolidation.  Normal vascularity. CARDIAC:  Normal size cardiac silhouette. MEDIASTINUM:  Normal. PLEURA:  No pneumothorax.  No pleural effusions. BONES:  Normal for age.            CONCLUSION:  No acute cardiopulmonary process.   LOCATION:  [facility]   Dictated by (CST): Roseline Chisholm MD on 1/29/2025 at 12:38 PM     Finalized by (CST): Roseline Chisholm MD on 1/29/2025 at 12:38 PM               [facility]   Differential diagnosis includes COVID, influenza, sinusitis, bronchitis, CAP    On exam the  patient afebrile nontoxic he is in no acute distress.  Nasal congestion present.  Posterior pharynx unremarkable.  Heart regular rate and rhythm.  She does have wheezing which clears with coughing.  Rest exam is unremarkable sats 99% on room air he is in no respiratory distress.  Declined viral testing.  Chest x-ray was performed due to adventitious breath sounds -negative for acute findings.  Clinical exam is consistent with a sinobronchitis.  Due to the wheezing started on prednisone.  Will prescribe doxycycline due to her Augmentin allergy.  Tessalon as needed for cough.  Strict ER precautions were discussed all questions were answered and patient with treatment discharge      Medical Decision Making  Problems Addressed:  Acute cough: acute illness or injury  Sinobronchitis: acute illness or injury    Amount and/or Complexity of Data Reviewed  Radiology: ordered and independent interpretation performed. Decision-making details documented in ED Course.    Risk  OTC drugs.  Prescription drug management.        Disposition and Plan     Clinical Impression:  1. Acute cough         Disposition:  There is no disposition on file for this visit.  There is no disposition time on file for this visit.    Follow-up:  No follow-up provider specified.        Medications Prescribed:  Current Discharge Medication List              Supplementary Documentation:

## 2025-01-29 NOTE — DISCHARGE INSTRUCTIONS
Continue to increase fluids and rest  Tessalon as needed for cough,   Take prednisone daily  Take doxycycline as directed till gone  Close follow-up with your primary care doctor  Return to the ER symptoms worsen

## 2025-01-31 NOTE — PROGRESS NOTES
No chief complaint on file.   Follow up from er visit.   This visit is conducted using Telemedicine with live, interactive video and audio.    Patient has been referred to the [facility] website at [email or web address] to review the yearly Consent to Treat document.    Patient understands and accepts financial responsibility for any deductible, co-insurance and/or co-pays associated with this service.        HPI:    Patient ID: Omega Conway is a 74 year old male.    HPI   ER visit for dizziness. Cardiac work up was normal.  Cardiologist recommend ILR and to see EP. He has only one episode since then and it can be due to low blood sugar since he did not eat well on that day.  No other episode.   Review of Systems  Pos dizziness neg chest pain sob abd pain nausea vomting.       Current Outpatient Medications   Medication Sig Dispense Refill    predniSONE 20 MG Oral Tab Take 2 tablets (40 mg total) by mouth daily for 5 days. 10 tablet 0    doxycycline 100 MG Oral Cap Take 1 capsule (100 mg total) by mouth 2 (two) times daily for 7 days. 14 capsule 0    benzonatate 200 MG Oral Cap Take 1 capsule (200 mg total) by mouth 3 (three) times daily as needed. 20 capsule 0    OMEPRAZOLE 20 MG Oral Capsule Delayed Release TAKE ONE CAPSULE BY MOUTH EVERY MORNING 90 capsule 0    TADALAFIL 5 MG Oral Tab TAKE ONE TABLET BY MOUTH DAILY 90 tablet 0    LISINOPRIL 20 MG Oral Tab TAKE ONE TABLET BY MOUTH DAILY 90 tablet 0    atorvastatin 40 MG Oral Tab Take 1 tablet (40 mg total) by mouth nightly. 90 tablet 0    Blood Glucose Monitoring Suppl (ONETOUCH VERIO FLEX SYSTEM) w/Device Does not apply Kit Test blood sugar as needed for symptoms of hypoglycemia 1 kit 1    Glucose Blood (ONETOUCH VERIO) In Vitro Strip Test blood sugar as needed for symptoms of hypoglycemia. 100 strip 6    OneTouch Delica Lancets 33G Does not apply Misc Test blood sugar as needed for symptoms of hypoglycemia. 100 each 6    FLUTICASONE PROPIONATE 50 MCG/ACT  Nasal Suspension SPRAY 2 SPRAYS INTO EACH NOSTRIL ONCE DAILY 48 g 0    acidophilus-pectin Oral Cap Take 1 capsule by mouth daily.      Vitamin D, Cholecalciferol, 50 MCG (2000 UT) Oral Cap Take 2,000 Units by mouth daily.      omega-3 fatty acids 1000 MG Oral Cap Take 1,000 mg by mouth daily.      aspirin 81 MG Oral Chew Tab Chew 1 tablet (81 mg total) by mouth daily.       Allergies:Allergies[1]    HISTORY:  Past Medical History:    Actinic keratosis    Anesthesia complication    Cancer (HCC)    skin cancers with surgery    Cataract    Elevated PSA, less than 10 ng/ml    Esophageal reflux    Hearing impairment    hearing aids    High blood pressure    High cholesterol    Hx of colonic polyp    Hyperlipidemia    Osteoarthritis    Unspecified essential hypertension    Visual impairment      Past Surgical History:   Procedure Laterality Date    Colonoscopy  2017    tics; repeat 5 yrs    Colonoscopy N/A 9/15/2023    Procedure: COLONOSCOPY with cold snare polypectomy;  Surgeon: Flaco Zavala DO;  Location:  ENDOSCOPY    Egd N/A 2017    Procedure: ESOPHAGOGASTRODUODENOSCOPY, COLONOSCOPY, POSSIBLE BIOPSY, POSSIBLE POLYPECTOMY 35144, 52122;  Surgeon: Maycol Hurd MD;  Location: [facility]    Egd  2021    [place], Barnhart's NO dysplasia, rpt 5 yrs    Other surgical history  2023    mohs    Tonsillectomy      Upper gi endo poss barrtts  2017    Barnhart's; HH- repeat 3 yr (no dysplasia)      Family History   Problem Relation Age of Onset    Cancer Father         pancreatic    Diabetes Father     Cancer Mother         breast    Diabetes Mother     Cancer Sister         melanoma-cancer free    Heart Disease Neg     Stroke Neg       Social History:   Social History     Socioeconomic History    Marital status:    Tobacco Use    Smoking status: Former     Current packs/day: 0.00     Types: Cigarettes     Quit date:      Years since quittin.1    Smokeless tobacco: Never     Tobacco comments:     briefly in army, 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   Vaping Use    Vaping status: Never Used   Substance and Sexual Activity    Alcohol use: Yes     Comment: 7 drinks/ week    Drug use: No     Social Drivers of Health     Financial Resource Strain: Low Risk  (12/3/2024)    Financial Resource Strain     Difficulty of Paying Living Expenses: Not hard at all     Med Affordability: No   Food Insecurity: No Food Insecurity (11/28/2024)    Food Insecurity     Food Insecurity: Never true   Transportation Needs: No Transportation Needs (11/28/2024)    Transportation Needs     Lack of Transportation: No   Housing Stability: Low Risk  (11/28/2024)    Housing Stability     Housing Instability: No        PHYSICAL EXAM:    There were no vitals taken for this visit.   Physical Exam  Constitutional:       General: He is not in acute distress.     Appearance: He is not ill-appearing.   Skin:     Capillary Refill: Capillary refill takes less than 2 seconds.   Neurological:      Mental Status: He is alert.              ASSESSMENT/PLAN:   1. Pure hypercholesterolemia  Labs ordered.  - Hemoglobin A1C [E]; Future  - TSH W Reflex To Free T4; Future    2. Syncope- can be due to low blood sugar. Recommend to eat small meal and drink enough water. If sym return further testing to be ordered.  Reviewed notes from er          No follow-ups on file.          [1]   Allergies  Allergen Reactions    Amoxicillin-Pot Clavulanate DIARRHEA and NAUSEA ONLY

## 2025-02-11 ENCOUNTER — OFFICE VISIT (OUTPATIENT)
Dept: FAMILY MEDICINE CLINIC | Facility: CLINIC | Age: 75
End: 2025-02-11
Payer: MEDICARE

## 2025-02-11 VITALS
HEIGHT: 71 IN | OXYGEN SATURATION: 99 % | BODY MASS INDEX: 24.55 KG/M2 | RESPIRATION RATE: 18 BRPM | TEMPERATURE: 97 F | DIASTOLIC BLOOD PRESSURE: 82 MMHG | SYSTOLIC BLOOD PRESSURE: 140 MMHG | WEIGHT: 175.38 LBS | HEART RATE: 74 BPM

## 2025-02-11 DIAGNOSIS — Z00.00 ANNUAL PHYSICAL EXAM: Primary | ICD-10-CM

## 2025-02-11 DIAGNOSIS — E78.00 PURE HYPERCHOLESTEROLEMIA: ICD-10-CM

## 2025-02-11 DIAGNOSIS — K22.70 BARRETT'S ESOPHAGUS WITHOUT DYSPLASIA: ICD-10-CM

## 2025-02-11 DIAGNOSIS — I45.10 RBBB: ICD-10-CM

## 2025-02-11 DIAGNOSIS — I10 ESSENTIAL HYPERTENSION: ICD-10-CM

## 2025-02-11 DIAGNOSIS — Z12.5 SCREENING PSA (PROSTATE SPECIFIC ANTIGEN): ICD-10-CM

## 2025-02-11 DIAGNOSIS — I25.10 ATHEROSCLEROSIS OF NATIVE CORONARY ARTERY OF NATIVE HEART WITHOUT ANGINA PECTORIS: ICD-10-CM

## 2025-02-11 PROCEDURE — 1125F AMNT PAIN NOTED PAIN PRSNT: CPT | Performed by: FAMILY MEDICINE

## 2025-02-11 PROCEDURE — 3079F DIAST BP 80-89 MM HG: CPT | Performed by: FAMILY MEDICINE

## 2025-02-11 PROCEDURE — 1170F FXNL STATUS ASSESSED: CPT | Performed by: FAMILY MEDICINE

## 2025-02-11 PROCEDURE — 3008F BODY MASS INDEX DOCD: CPT | Performed by: FAMILY MEDICINE

## 2025-02-11 PROCEDURE — 3077F SYST BP >= 140 MM HG: CPT | Performed by: FAMILY MEDICINE

## 2025-02-11 PROCEDURE — G0439 PPPS, SUBSEQ VISIT: HCPCS | Performed by: FAMILY MEDICINE

## 2025-02-11 PROCEDURE — 96160 PT-FOCUSED HLTH RISK ASSMT: CPT | Performed by: FAMILY MEDICINE

## 2025-02-11 PROCEDURE — 1159F MED LIST DOCD IN RCRD: CPT | Performed by: FAMILY MEDICINE

## 2025-02-11 PROCEDURE — 1160F RVW MEDS BY RX/DR IN RCRD: CPT | Performed by: FAMILY MEDICINE

## 2025-02-11 NOTE — PROGRESS NOTES
Subjective:   Omega Conway is a 74 year old male who presents for a MA AHA (Medicare Advantage Annual Health Assessment) and Subsequent Annual Wellness visit (Pt already had Initial Annual Wellness) and stable chronic illnesses (not addressed in visit).   Follow up on feeling shaky and he is doing much better.   He has not further episode. His blood sugar was one time low 75 and he eat now more regular. Eating protien bar frequently.  No chest pain sob palpitation  He does wear hearing aid     History/Other:   Fall Risk Assessment:   He has been screened for Falls and is low risk.      Cognitive Assessment:   He had a completely normal cognitive assessment - see flowsheet entries     Functional Ability/Status:   Omega Conway has some abnormal functions as listed below:  He has Hearing problems based on screening of functional status.He has Vision problems based on screening of functional status.       Depression Screening (PHQ):  PHQ-2 SCORE: 0  , done 2/4/2025            Advanced Directives:   He does have a Living Will but we do NOT have it on file in Epic.    He does have a POA but we do NOT have it on file in Epic.    Discussed Advance Care Planning with patient (and family/surrogate if present). Standard forms made available to patient in After Visit Summary.      Patient Active Problem List   Diagnosis    Barnhart's esophagus without dysplasia    Essential hypertension    Pure hypercholesterolemia    Atherosclerosis of native coronary artery of native heart without angina pectoris    RBBB     Allergies:  He is allergic to amoxicillin-pot clavulanate.    Current Medications:  Outpatient Medications Marked as Taking for the 2/11/25 encounter (Office Visit) with Ingris Hays MD   Medication Sig    OMEPRAZOLE 20 MG Oral Capsule Delayed Release TAKE ONE CAPSULE BY MOUTH EVERY MORNING    TADALAFIL 5 MG Oral Tab TAKE ONE TABLET BY MOUTH DAILY    LISINOPRIL 20 MG Oral Tab TAKE ONE TABLET BY MOUTH  DAILY    atorvastatin 40 MG Oral Tab Take 1 tablet (40 mg total) by mouth nightly.    Blood Glucose Monitoring Suppl (ONETOUCH VERIO FLEX SYSTEM) w/Device Does not apply Kit Test blood sugar as needed for symptoms of hypoglycemia    Glucose Blood (ONETOUCH VERIO) In Vitro Strip Test blood sugar as needed for symptoms of hypoglycemia.    OneTouch Delica Lancets 33G Does not apply Misc Test blood sugar as needed for symptoms of hypoglycemia.    FLUTICASONE PROPIONATE 50 MCG/ACT Nasal Suspension SPRAY 2 SPRAYS INTO EACH NOSTRIL ONCE DAILY    acidophilus-pectin Oral Cap Take 1 capsule by mouth daily.    Vitamin D, Cholecalciferol, 50 MCG (2000 UT) Oral Cap Take 2,000 Units by mouth daily.    omega-3 fatty acids 1000 MG Oral Cap Take 1,000 mg by mouth daily.    aspirin 81 MG Oral Chew Tab Chew 1 tablet (81 mg total) by mouth daily.       Medical History:  He  has a past medical history of Actinic keratosis, Anesthesia complication, Cancer (HCC), Cataract, Elevated PSA, less than 10 ng/ml (04/10/2022), Esophageal reflux, Hearing impairment, High blood pressure, High cholesterol, colonic polyp, Hyperlipidemia, Osteoarthritis, Unspecified essential hypertension, and Visual impairment.  Surgical History:  He  has a past surgical history that includes tonsillectomy; upper gi endo poss barrtts (07/2017); colonoscopy (07/2017); egd (N/A, 07/20/2017); egd (04/2021); other surgical history (02/2023); and colonoscopy (N/A, 9/15/2023).   Family History:  His family history includes Cancer in his father, mother, and sister; Diabetes in his father and mother.  Social History:  He  reports that he quit smoking about 57 years ago. His smoking use included cigarettes. He has never used smokeless tobacco. He reports current alcohol use. He reports that he does not use drugs.    Tobacco:  He smoked tobacco in the past but quit greater than 12 months ago.  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      Patient Care Team:  Ingris Hays MD as PCP - General (Family Medicine)  Maycol Hurd MD (GASTROENTEROLOGY)  Kale Warren MD (CARDIOLOGY)    Review of Systems   Constitutional:  Negative for fatigue and fever.   HENT:  Negative for congestion.    Respiratory:  Negative for cough, shortness of breath and wheezing.    Cardiovascular:  Negative for chest pain and leg swelling.   Gastrointestinal:  Negative for abdominal pain, blood in stool, constipation and diarrhea.   Genitourinary:  Negative for difficulty urinating.   Neurological:  Negative for dizziness.         Objective:   Physical Exam  Constitutional:       General: He is not in acute distress.     Appearance: He is not ill-appearing.   HENT:      Right Ear: Tympanic membrane normal.      Left Ear: Tympanic membrane normal.      Nose: No congestion or rhinorrhea.      Mouth/Throat:      Pharynx: No oropharyngeal exudate or posterior oropharyngeal erythema.   Cardiovascular:      Rate and Rhythm: Normal rate and regular rhythm.      Pulses: Normal pulses.      Heart sounds: Normal heart sounds.   Pulmonary:      Effort: Pulmonary effort is normal.      Breath sounds: Normal breath sounds.   Abdominal:      General: There is no distension.      Tenderness: There is no abdominal tenderness. There is no guarding or rebound.   Skin:     Capillary Refill: Capillary refill takes less than 2 seconds.   Neurological:      Mental Status: He is alert. Mental status is at baseline.         /82 (BP Location: Left arm, Patient Position: Sitting, Cuff Size: adult)   Pulse 74   Temp 97.4 °F (36.3 °C) (Temporal)   Resp 18   Ht 5' 11\" (1.803 m)   Wt 175 lb 6.4 oz (79.6 kg)   SpO2 99%   BMI 24.46 kg/m²   Estimated body mass index is 24.46 kg/m² as calculated from the following:    Height as of this encounter: 5' 11\" (1.803 m).    Weight as of this encounter: 175 lb 6.4 oz (79.6 kg).    Medicare Hearing Assessment:   Hearing Screening    Time taken: 2/11/2025  3:21 PM  Screening Method: Finger Rub  Finger Rub Result: Pass         Visual Acuity:   Right Eye Visual Acuity: Corrected Right Eye Chart Acuity: 20/25   Left Eye Visual Acuity: Corrected Left Eye Chart Acuity: 20/25   Both Eyes Visual Acuity: Corrected Both Eyes Chart Acuity: 20/25   Able To Tolerate Visual Acuity: Yes        Assessment & Plan:   Omega Conway is a 74 year old male who presents for a Medicare Assessment.     1. Annual physical exam (Primary)  Doing well  Labs reviewed  Colonoscopy next year  2. Screening PSA (prostate specific antigen)  stable  -     PSA Total, Screen; Future; Expected date: 02/11/2025  3. Pure hypercholesterolemia  Stable seeing cardiology  -     Lipid Panel; Future; Expected date: 02/11/2025  4. Essential hypertension  stable  5. Atherosclerosis of native coronary artery of native heart without angina pectoris  stable  6. RBBB  stable  7. Barnhart's esophagus without dysplasia  Stable seeing gi  The patient indicates understanding of these issues and agrees to the plan.  Reinforced healthy diet, lifestyle, and exercise.      No follow-ups on file.     Ingris Hays MD, 2/11/2025     Supplementary Documentation:   General Health:  In the past six months, have you lost more than 10 pounds without trying?: (Proxy-Rptd) 2 - No  Has your appetite been poor?: (Proxy-Rptd) No  Type of Diet: (Proxy-Rptd) Balanced  How does the patient maintain a good energy level?: (Proxy-Rptd) Other  How would you describe your daily physical activity?: (Proxy-Rptd) Moderate  How would you describe your current health state?: (Proxy-Rptd) Good  How do you maintain positive mental well-being?: (Proxy-Rptd) Social Interaction  On a scale of  0 to 10, with 0 being no pain and 10 being severe pain, what is your pain level?: (Proxy-Rptd) 3 - (Mild)  In the past six months, have you experienced urine leakage?: (Proxy-Rptd) 0-No  At any time do you feel concerned for the safety/well-being of yourself and/or your children, in your home or elsewhere?: (Proxy-Rptd) No  Have you had any immunizations at another office such as Influenza, Hepatitis B, Tetanus, or Pneumococcal?: (Proxy-Rptd) Yes    Health Maintenance   Topic Date Due    Annual Well Visit  01/01/2025    Annual Depression Screening  01/01/2025    Colorectal Cancer Screening  09/15/2026    Influenza Vaccine  Completed    Fall Risk Screening (Annual)  Completed    Pneumococcal Vaccine: 50+ Years  Completed    Zoster Vaccines  Completed    COVID-19 Vaccine  Completed    Meningococcal B Vaccine  Aged Out

## 2025-02-13 PROBLEM — I73.9 PVD (PERIPHERAL VASCULAR DISEASE): Status: RESOLVED | Noted: 2023-10-27 | Resolved: 2025-02-13

## 2025-02-13 PROBLEM — R93.1 ABNORMAL HEART SCORE CT: Status: RESOLVED | Noted: 2018-07-31 | Resolved: 2025-02-13

## 2025-02-13 PROBLEM — Z86.0100 PERSONAL HISTORY OF COLONIC POLYPS: Status: RESOLVED | Noted: 2017-03-16 | Resolved: 2025-02-13

## 2025-02-13 PROBLEM — K21.00 GASTROESOPHAGEAL REFLUX DISEASE WITH ESOPHAGITIS: Status: RESOLVED | Noted: 2017-03-16 | Resolved: 2025-02-13

## 2025-02-13 PROBLEM — R07.9 ACUTE CHEST PAIN: Status: RESOLVED | Noted: 2024-11-28 | Resolved: 2025-02-13

## 2025-02-13 PROBLEM — N40.0 BENIGN PROSTATIC HYPERPLASIA: Status: RESOLVED | Noted: 2018-01-16 | Resolved: 2025-02-13

## 2025-02-27 ENCOUNTER — LAB ENCOUNTER (OUTPATIENT)
Dept: LAB | Age: 75
End: 2025-02-27
Attending: FAMILY MEDICINE
Payer: MEDICARE

## 2025-02-27 DIAGNOSIS — Z12.5 SCREENING PSA (PROSTATE SPECIFIC ANTIGEN): ICD-10-CM

## 2025-02-27 DIAGNOSIS — E78.00 PURE HYPERCHOLESTEROLEMIA: ICD-10-CM

## 2025-02-27 LAB
CHOLEST SERPL-MCNC: 129 MG/DL (ref ?–200)
COMPLEXED PSA SERPL-MCNC: 3.45 NG/ML (ref ?–4)
FASTING PATIENT LIPID ANSWER: YES
HDLC SERPL-MCNC: 42 MG/DL (ref 40–59)
LDLC SERPL CALC-MCNC: 68 MG/DL (ref ?–100)
NONHDLC SERPL-MCNC: 87 MG/DL (ref ?–130)
TRIGL SERPL-MCNC: 103 MG/DL (ref 30–149)
VLDLC SERPL CALC-MCNC: 16 MG/DL (ref 0–30)

## 2025-02-27 PROCEDURE — 36415 COLL VENOUS BLD VENIPUNCTURE: CPT

## 2025-02-27 PROCEDURE — 80061 LIPID PANEL: CPT

## 2025-02-28 DIAGNOSIS — I73.9 PVD (PERIPHERAL VASCULAR DISEASE): ICD-10-CM

## 2025-02-28 DIAGNOSIS — I45.10 RBBB: ICD-10-CM

## 2025-02-28 DIAGNOSIS — H65.93 MIDDLE EAR EFFUSION, BILATERAL: ICD-10-CM

## 2025-02-28 DIAGNOSIS — I10 ESSENTIAL HYPERTENSION: ICD-10-CM

## 2025-02-28 DIAGNOSIS — E78.00 PURE HYPERCHOLESTEROLEMIA: ICD-10-CM

## 2025-02-28 DIAGNOSIS — R44.8 FACIAL PRESSURE: ICD-10-CM

## 2025-02-28 DIAGNOSIS — R93.1 ABNORMAL HEART SCORE CT: ICD-10-CM

## 2025-02-28 DIAGNOSIS — I25.10 ATHEROSCLEROSIS OF NATIVE CORONARY ARTERY OF NATIVE HEART, UNSPECIFIED WHETHER ANGINA PRESENT: ICD-10-CM

## 2025-02-28 DIAGNOSIS — I25.10 ATHEROSCLEROSIS OF NATIVE CORONARY ARTERY OF NATIVE HEART: ICD-10-CM

## 2025-02-28 DIAGNOSIS — R42 EPISODIC LIGHTHEADEDNESS: ICD-10-CM

## 2025-03-01 NOTE — TELEPHONE ENCOUNTER
Cholesterol Medication Protocol [id number]/28/2025 11:33 PM   Protocol Details Medication is active on med list    ALT < 80    ALT resulted within past year    Lipid panel within past 12 months    In person appointment or virtual visit in the past 12 mos or appointment in next 3 mos     Hypertension Medications Protocol [id number]/28/2025 11:33 PM   Protocol Details Last BP reading less than 140/90    CMP or BMP in past 12 months    In person appointment or virtual visit in the past 12 mos or appointment in next 3 mos    EGFRCR or GFRNAA > 50    Medication is active on med list       To be filled at: [id number] - [facility] [address] [phone], [phone]

## 2025-03-01 NOTE — TELEPHONE ENCOUNTER
Last office visit with Dr. Hays on 2/11/2025 for annual physical    Last refill of stain on 12/2/2024 for 90 days  Lisinopril on 12/2/2024 for 90 days

## 2025-03-03 RX ORDER — ATORVASTATIN CALCIUM 40 MG/1
40 TABLET, FILM COATED ORAL EVERY EVENING
Qty: 90 TABLET | Refills: 1 | Status: SHIPPED | OUTPATIENT
Start: 2025-03-03

## 2025-03-03 RX ORDER — TADALAFIL 5 MG/1
5 TABLET ORAL DAILY
Qty: 90 TABLET | Refills: 0 | Status: SHIPPED | OUTPATIENT
Start: 2025-03-03

## 2025-03-03 RX ORDER — LISINOPRIL 20 MG/1
20 TABLET ORAL DAILY
Qty: 90 TABLET | Refills: 1 | Status: SHIPPED | OUTPATIENT
Start: 2025-03-03

## 2025-03-03 NOTE — TELEPHONE ENCOUNTER
Scheduled   Future Appointments   Date Time Provider Department Center   3/6/2025 11:30 AM [facility] NURSE [facility] [place]

## 2025-03-06 ENCOUNTER — NURSE ONLY (OUTPATIENT)
Dept: FAMILY MEDICINE CLINIC | Facility: CLINIC | Age: 75
End: 2025-03-06
Payer: MEDICARE

## 2025-03-06 ENCOUNTER — TELEPHONE (OUTPATIENT)
Dept: FAMILY MEDICINE CLINIC | Facility: CLINIC | Age: 75
End: 2025-03-06

## 2025-03-06 VITALS — DIASTOLIC BLOOD PRESSURE: 80 MMHG | SYSTOLIC BLOOD PRESSURE: 140 MMHG

## 2025-03-06 PROCEDURE — 3079F DIAST BP 80-89 MM HG: CPT | Performed by: FAMILY MEDICINE

## 2025-03-06 PROCEDURE — 3077F SYST BP >= 140 MM HG: CPT | Performed by: FAMILY MEDICINE

## 2025-03-06 NOTE — PROGRESS NOTES
Patient here for blood pressure check  Took lisinopril this morning around 8am  Blood pressure 162/82  2nd blood pressure check 140/80  Will send to Dr Hays to advise  Left office in stable condition

## 2025-03-07 NOTE — TELEPHONE ENCOUNTER
Check at home and send us readings over the next 2 weeks  Keep meds the same  Patient advised. Verbalized understanding.

## 2025-03-21 ENCOUNTER — PATIENT MESSAGE (OUTPATIENT)
Dept: FAMILY MEDICINE CLINIC | Facility: CLINIC | Age: 75
End: 2025-03-21

## 2025-03-21 NOTE — TELEPHONE ENCOUNTER
Some blood pressure in goal range and some out of range  What time is he taking his medication in relation to when these blood pressures are being taken?

## 2025-03-24 NOTE — TELEPHONE ENCOUNTER
Discussed with Dr. Hays  Please schedule blood pressure follow up with Dr. Hays. Bring home monitor with to the appointment

## 2025-03-31 ENCOUNTER — OFFICE VISIT (OUTPATIENT)
Dept: FAMILY MEDICINE CLINIC | Facility: CLINIC | Age: 75
End: 2025-03-31
Payer: MEDICARE

## 2025-03-31 VITALS
WEIGHT: 175 LBS | OXYGEN SATURATION: 99 % | SYSTOLIC BLOOD PRESSURE: 140 MMHG | RESPIRATION RATE: 18 BRPM | BODY MASS INDEX: 24.5 KG/M2 | DIASTOLIC BLOOD PRESSURE: 78 MMHG | HEIGHT: 71 IN | TEMPERATURE: 98 F | HEART RATE: 76 BPM

## 2025-03-31 DIAGNOSIS — I10 ESSENTIAL HYPERTENSION: Primary | ICD-10-CM

## 2025-03-31 PROCEDURE — 99213 OFFICE O/P EST LOW 20 MIN: CPT | Performed by: FAMILY MEDICINE

## 2025-03-31 PROCEDURE — 3008F BODY MASS INDEX DOCD: CPT | Performed by: FAMILY MEDICINE

## 2025-03-31 PROCEDURE — 1159F MED LIST DOCD IN RCRD: CPT | Performed by: FAMILY MEDICINE

## 2025-03-31 PROCEDURE — 1160F RVW MEDS BY RX/DR IN RCRD: CPT | Performed by: FAMILY MEDICINE

## 2025-03-31 PROCEDURE — 3078F DIAST BP <80 MM HG: CPT | Performed by: FAMILY MEDICINE

## 2025-03-31 PROCEDURE — 3077F SYST BP >= 140 MM HG: CPT | Performed by: FAMILY MEDICINE

## 2025-03-31 RX ORDER — ALBUTEROL SULFATE 90 UG/1
2 INHALANT RESPIRATORY (INHALATION) EVERY 4 HOURS PRN
Qty: 1 EACH | Refills: 0 | Status: SHIPPED | OUTPATIENT
Start: 2025-03-31

## 2025-03-31 NOTE — PROGRESS NOTES
No chief complaint on file.   Bp follow up.    HPI:    Patient ID: Omega Conway is a 74 year old male.    HPI here for blood pressure check.  Home blood pressure are low and also on high range.  Blood pressure in the office is looking good his blood pressure machine was showing a very high number.  Did recommend to get a new machine.  He has no symptoms right now.  He has seen a cardiologist   Blood pressure stable today continue to monitor      Review of Systems  Negative chest pain shortness of breath dizziness      Current Outpatient Medications   Medication Sig Dispense Refill    albuterol (PROAIR HFA) 108 (90 Base) MCG/ACT Inhalation Aero Soln Inhale 2 puffs into the lungs every 4 (four) hours as needed for Wheezing. 1 each 0    ATORVASTATIN 40 MG Oral Tab TAKE ONE TABLET BY MOUTH EVERY EVENING 90 tablet 1    LISINOPRIL 20 MG Oral Tab TAKE ONE TABLET BY MOUTH DAILY 90 tablet 1    TADALAFIL 5 MG Oral Tab TAKE ONE TABLET BY MOUTH DAILY 90 tablet 0    OMEPRAZOLE 20 MG Oral Capsule Delayed Release TAKE ONE CAPSULE BY MOUTH EVERY MORNING 90 capsule 0    Blood Glucose Monitoring Suppl (ONETOUCH VERIO FLEX SYSTEM) w/Device Does not apply Kit Test blood sugar as needed for symptoms of hypoglycemia 1 kit 1    Glucose Blood (ONETOUCH VERIO) In Vitro Strip Test blood sugar as needed for symptoms of hypoglycemia. 100 strip 6    OneTouch Delica Lancets 33G Does not apply Misc Test blood sugar as needed for symptoms of hypoglycemia. 100 each 6    FLUTICASONE PROPIONATE 50 MCG/ACT Nasal Suspension SPRAY 2 SPRAYS INTO EACH NOSTRIL ONCE DAILY 48 g 0    acidophilus-pectin Oral Cap Take 1 capsule by mouth daily.      Vitamin D, Cholecalciferol, 50 MCG (2000 UT) Oral Cap Take 2,000 Units by mouth daily.      omega-3 fatty acids 1000 MG Oral Cap Take 1,000 mg by mouth daily.      aspirin 81 MG Oral Chew Tab Chew 1 tablet (81 mg total) by mouth daily.       Allergies:Allergies[1]    HISTORY:  Past Medical History:    Actinic  keratosis    Anesthesia complication    Cancer (HCC)    skin cancers with surgery    Cataract    Elevated PSA, less than 10 ng/ml    Esophageal reflux    Hearing impairment    hearing aids    High blood pressure    High cholesterol    Hx of colonic polyp    Hyperlipidemia    Osteoarthritis    Unspecified essential hypertension    Visual impairment      Past Surgical History:   Procedure Laterality Date    Colonoscopy  2017    tics; repeat 5 yrs    Colonoscopy N/A 9/15/2023    Procedure: COLONOSCOPY with cold snare polypectomy;  Surgeon: Flaco Zavala DO;  Location:  ENDOSCOPY    Egd N/A 2017    Procedure: ESOPHAGOGASTRODUODENOSCOPY, COLONOSCOPY, POSSIBLE BIOPSY, POSSIBLE POLYPECTOMY 43301, 20640;  Surgeon: Maycol Hurd MD;  Location: [facility]    Egd  2021    [place], [facility] NO dysplasia, rpt 5 yrs    Other surgical history  2023    mohs    Tonsillectomy      Upper gi endo poss barrtts  2017    [facility]; [facility]- repeat 3 yr (no dysplasia)      Family History   Problem Relation Age of Onset    Cancer Father         pancreatic    Diabetes Father     Cancer Mother         breast    Diabetes Mother     Cancer Sister         melanoma-cancer free    Heart Disease Neg     Stroke Neg       Social History:   Social History     Socioeconomic History    Marital status:    Tobacco Use    Smoking status: Former     Current packs/day: 0.00     Types: Cigarettes     Quit date: 1968     Years since quittin.2    Smokeless tobacco: Never    Tobacco comments:     briefly in army, 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   Vaping Use    Vaping status: Never Used   Substance and Sexual Activity    Alcohol use: Yes     Comment: 7 drinks/ week    Drug use: No     Social Drivers of Health     Food Insecurity: No Food Insecurity (3/31/2025)    NCSS - Food Insecurity     Worried About Running Out of Food in the Last Year: No     Ran Out of Food in the Last Year: No   Transportation Needs: No Transportation Needs  (3/31/2025)    NCSS - Transportation     Lack of Transportation: No   Housing Stability: Not At Risk (3/31/2025)    NCSS - Housing/Utilities     Has Housing: Yes     Worried About Losing Housing: No     Unable to Get Utilities: No        PHYSICAL EXAM:    /78 (BP Location: Left arm, Patient Position: Sitting, Cuff Size: adult)   Pulse 76   Temp 98 °F (36.7 °C) (Temporal)   Resp 18   Ht 5' 11\" (1.803 m)   Wt 175 lb (79.4 kg)   SpO2 99%   BMI 24.41 kg/m²    Physical Exam  Constitutional:       General: He is not in acute distress.     Appearance: He is not ill-appearing.   Cardiovascular:      Rate and Rhythm: Normal rate and regular rhythm.      Pulses: Normal pulses.      Heart sounds: Normal heart sounds.   Pulmonary:      Effort: Pulmonary effort is normal.      Breath sounds: Normal breath sounds.   Skin:     General: Skin is warm.      Capillary Refill: Capillary refill takes less than 2 seconds.   Neurological:      Mental Status: He is alert.              ASSESSMENT/PLAN:   1. Essential hypertension  Blood pressure stable continue medication             No follow-ups on file.          [1]   Allergies  Allergen Reactions    Amoxicillin-Pot Clavulanate DIARRHEA and NAUSEA ONLY

## 2025-04-16 DIAGNOSIS — Z86.0100 HISTORY OF COLONIC POLYPS: ICD-10-CM

## 2025-04-16 DIAGNOSIS — K22.70 BARRETT'S ESOPHAGUS WITHOUT DYSPLASIA: ICD-10-CM

## 2025-04-16 DIAGNOSIS — K21.00 GASTROESOPHAGEAL REFLUX DISEASE WITH ESOPHAGITIS: ICD-10-CM

## 2025-04-16 RX ORDER — OMEPRAZOLE 20 MG/1
20 CAPSULE, DELAYED RELEASE ORAL EVERY MORNING
Qty: 90 CAPSULE | Refills: 0 | Status: SHIPPED | OUTPATIENT
Start: 2025-04-16

## 2025-04-16 NOTE — TELEPHONE ENCOUNTER
Last office visit 3/31/25  Last refilled on 1/21/25 for # 90 with 0 refills  No future appointments.     Thank you.

## 2025-05-27 RX ORDER — TADALAFIL 5 MG/1
5 TABLET ORAL DAILY
Qty: 90 TABLET | Refills: 0 | Status: SHIPPED | OUTPATIENT
Start: 2025-05-27

## 2025-07-15 DIAGNOSIS — K22.70 BARRETT'S ESOPHAGUS WITHOUT DYSPLASIA: ICD-10-CM

## 2025-07-15 DIAGNOSIS — K21.00 GASTROESOPHAGEAL REFLUX DISEASE WITH ESOPHAGITIS: ICD-10-CM

## 2025-07-15 DIAGNOSIS — Z86.0100 HISTORY OF COLONIC POLYPS: ICD-10-CM

## 2025-07-15 RX ORDER — OMEPRAZOLE 20 MG/1
20 CAPSULE, DELAYED RELEASE ORAL EVERY MORNING
Qty: 90 CAPSULE | Refills: 0 | Status: SHIPPED | OUTPATIENT
Start: 2025-07-15

## 2025-07-15 NOTE — TELEPHONE ENCOUNTER
Omeprazole last refilled 4/16/25  No future appointment in office  LOV with  3/31/25      Gastrointestional Medication Protocol Xvanxo48/15/2025 03:13 PM   Protocol Details In person appointment or virtual visit in the past 12 mos or appointment in next 3 mos    Medication is active on med list

## 2025-08-17 DIAGNOSIS — I10 ESSENTIAL HYPERTENSION: ICD-10-CM

## 2025-08-17 DIAGNOSIS — I45.10 RBBB: ICD-10-CM

## 2025-08-17 DIAGNOSIS — R42 EPISODIC LIGHTHEADEDNESS: ICD-10-CM

## 2025-08-17 DIAGNOSIS — R93.1 ABNORMAL HEART SCORE CT: ICD-10-CM

## 2025-08-17 DIAGNOSIS — E78.00 PURE HYPERCHOLESTEROLEMIA: ICD-10-CM

## 2025-08-17 DIAGNOSIS — I25.10 ATHEROSCLEROSIS OF NATIVE CORONARY ARTERY OF NATIVE HEART: ICD-10-CM

## 2025-08-17 DIAGNOSIS — I25.10 ATHEROSCLEROSIS OF NATIVE CORONARY ARTERY OF NATIVE HEART, UNSPECIFIED WHETHER ANGINA PRESENT: ICD-10-CM

## 2025-08-17 DIAGNOSIS — I73.9 PVD (PERIPHERAL VASCULAR DISEASE): ICD-10-CM

## 2025-08-18 RX ORDER — TADALAFIL 5 MG/1
5 TABLET ORAL DAILY
Qty: 90 TABLET | Refills: 0 | Status: SHIPPED | OUTPATIENT
Start: 2025-08-18

## 2025-08-18 RX ORDER — LISINOPRIL 20 MG/1
20 TABLET ORAL DAILY
Qty: 90 TABLET | Refills: 0 | Status: SHIPPED | OUTPATIENT
Start: 2025-08-18

## 2025-08-25 DIAGNOSIS — R44.8 FACIAL PRESSURE: ICD-10-CM

## 2025-08-25 DIAGNOSIS — H65.93 MIDDLE EAR EFFUSION, BILATERAL: ICD-10-CM

## 2025-08-25 RX ORDER — ATORVASTATIN CALCIUM 40 MG/1
40 TABLET, FILM COATED ORAL EVERY EVENING
Qty: 90 TABLET | Refills: 0 | Status: SHIPPED | OUTPATIENT
Start: 2025-08-25

## (undated) DIAGNOSIS — Z12.5 SCREENING FOR PROSTATE CANCER: ICD-10-CM

## (undated) DIAGNOSIS — R97.20 ABNORMAL PSA: ICD-10-CM

## (undated) DEVICE — TRAP SPEC REMOVAL ETRAP 15CM

## (undated) DEVICE — VALVE KIT SGL USE DEFENDO

## (undated) DEVICE — 3M™ RED DOT™ MONITORING ELECTRODE WITH FOAM TAPE AND STICKY GEL 2570-3, 3/BAG, 200/CASE, 54/PLT: Brand: RED DOT™

## (undated) DEVICE — LASSO POLYPECTOMY SNARE: Brand: LASSO

## (undated) DEVICE — 1200CC GUARDIAN II: Brand: GUARDIAN

## (undated) DEVICE — 10FT COMBINED O2 DELIVERY/CO2 MONITORING. FILTER WITH MICROSTREAM TYPE LUER: Brand: DUAL ADULT NASAL CANNULA

## (undated) DEVICE — BIOGUARD CLEANING ADAPTER

## (undated) DEVICE — STERIS KITS

## (undated) NOTE — ED AVS SNAPSHOT
THE [facility] in [address] [address]    Phone:  [phone]    Fax:  [phone]   MRN: [id number]    Department:  THE [facility] in [place]   Date of Visit:  5/4/2017           Diagn closely. If no improvement within 2-3 days, fever develops, or condition worsens, you may initiate antibiotic. Avoid if improving.     Discharge References/Attachments     SINUSITIS (NO ANTIBIOTICS) (ENGLISH)      Disclosure     Insurance plans vary and t Immediate Cares. Follow-up care is at the discretion of that Physician. IF THERE IS ANY CHANGE OR WORSENING OF YOUR CONDITION, CALL YOUR PRIMARY CARE PHYSICIAN AT ONCE OR GO TO THE EMERGENCY DEPARTMENT.     If you have been prescribed any medication(s), - If you don’t have insurance, [facility]nes has partnered with [facility] to help you get signed up for insurance coverage.   [facility] is a Federal Navigator program that can help with your Affordable Care Act cover

## (undated) NOTE — LETTER
02/10/20  [place] [id number]      Dear Tang Mariano. To help us provide the highest quality medical care, [facility] uses a sophisticated computer system to track our patient records.  During a re

## (undated) NOTE — ED AVS SNAPSHOT
Sunita Matteo   MRN: [id number]    Department:  THE [facility] Emergency Department in [place]   Date of Visit:  1/26/2020           Disclosure     Insurance plans vary and the physician(s) referred by the ER may not be covered by your plan.  Please contac tell this physician (or your personal doctor if your instructions are to return to your personal doctor) about any new or lasting problems. The primary care or specialist physician will see patients referred from the [facility] Emergency Department.  Rosalba Montes

## (undated) NOTE — ED AVS SNAPSHOT
THE [facility] in [address] [facility] [address] [address]    Phone:  [phone]    Fax:  [phone]   MRN: [id number]    Department:  THE [facility] in [place]   Date of Visit:  4/11/2017           Diag Insurance plans vary and the physician(s) referred by the Immediate Care may not be covered by your plan. Please contact your insurance company to determine coverage for follow-up care and referrals. [facility]  [address] If you have been prescribed any medication(s), please fill your prescription right away and begin taking the medication(s) as directed.     If the Immediate Care Provider has read X-rays, these will be re-interpreted by a radiologist.  If there is a signifi can help with your Affordable Care Act coverage, as well as all types of Medicaid plans. To get signed up and covered, please call [phone] and ask to get set up for an insurance coverage that is in-network with [facility]

## (undated) NOTE — Clinical Note
Can we please call the cardiologist's nurse to inform them that patient is having worsening leg cramps.  I would like to reduce atorvastatin dose from 40mg to 20mg; however, since he is under the care of a cardiologist I wanted to loop them in.